# Patient Record
Sex: MALE | Race: WHITE | Employment: OTHER | URBAN - METROPOLITAN AREA
[De-identification: names, ages, dates, MRNs, and addresses within clinical notes are randomized per-mention and may not be internally consistent; named-entity substitution may affect disease eponyms.]

---

## 2017-01-12 PROCEDURE — 36415 COLL VENOUS BLD VENIPUNCTURE: CPT | Performed by: FAMILY MEDICINE

## 2017-01-12 PROCEDURE — 80061 LIPID PANEL: CPT | Performed by: FAMILY MEDICINE

## 2017-01-12 PROCEDURE — 80053 COMPREHEN METABOLIC PANEL: CPT | Performed by: FAMILY MEDICINE

## 2017-01-12 PROCEDURE — 83036 HEMOGLOBIN GLYCOSYLATED A1C: CPT | Performed by: FAMILY MEDICINE

## 2017-05-10 PROBLEM — I42.9 CARDIOMYOPATHY, UNSPECIFIED TYPE (HCC): Status: ACTIVE | Noted: 2017-05-10

## 2017-05-10 PROBLEM — G89.29 CHRONIC RIGHT-SIDED LOW BACK PAIN WITH RIGHT-SIDED SCIATICA: Status: ACTIVE | Noted: 2017-05-10

## 2017-05-10 PROBLEM — M54.41 CHRONIC RIGHT-SIDED LOW BACK PAIN WITH RIGHT-SIDED SCIATICA: Status: ACTIVE | Noted: 2017-05-10

## 2017-07-24 ENCOUNTER — HOSPITAL ENCOUNTER (OUTPATIENT)
Facility: HOSPITAL | Age: 72
Setting detail: HOSPITAL OUTPATIENT SURGERY
Discharge: HOME | End: 2017-07-24
Attending: INTERNAL MEDICINE | Admitting: INTERNAL MEDICINE
Payer: MEDICARE

## 2017-07-24 ENCOUNTER — SURGERY (OUTPATIENT)
Age: 72
End: 2017-07-24

## 2017-07-24 VITALS
TEMPERATURE: 98 F | RESPIRATION RATE: 18 BRPM | HEIGHT: 67 IN | BODY MASS INDEX: 20.88 KG/M2 | SYSTOLIC BLOOD PRESSURE: 126 MMHG | HEART RATE: 61 BPM | WEIGHT: 133 LBS | OXYGEN SATURATION: 96 % | DIASTOLIC BLOOD PRESSURE: 52 MMHG

## 2017-07-24 DIAGNOSIS — Z86.010 PERSONAL HISTORY OF COLONIC POLYPS: ICD-10-CM

## 2017-07-24 LAB — GLUCOSE BLD-MCNC: 94 MG/DL (ref 65–99)

## 2017-07-24 PROCEDURE — 0DJD8ZZ INSPECTION OF LOWER INTESTINAL TRACT, VIA NATURAL OR ARTIFICIAL OPENING ENDOSCOPIC: ICD-10-PCS | Performed by: INTERNAL MEDICINE

## 2017-07-24 PROCEDURE — 82962 GLUCOSE BLOOD TEST: CPT

## 2017-07-24 RX ORDER — MIDAZOLAM HYDROCHLORIDE 1 MG/ML
INJECTION INTRAMUSCULAR; INTRAVENOUS
Status: DISCONTINUED | OUTPATIENT
Start: 2017-07-24 | End: 2017-07-24

## 2017-07-24 RX ORDER — SODIUM CHLORIDE, SODIUM LACTATE, POTASSIUM CHLORIDE, CALCIUM CHLORIDE 600; 310; 30; 20 MG/100ML; MG/100ML; MG/100ML; MG/100ML
INJECTION, SOLUTION INTRAVENOUS CONTINUOUS
Status: DISCONTINUED | OUTPATIENT
Start: 2017-07-24 | End: 2017-07-24

## 2017-07-24 NOTE — OPERATIVE REPORT
[facility] Patient Status:  Hospital Outpatient Surgery    1945 MRN [id number]   [facility] ENDOSCOPY Attending Naina Etienne MD   Hosp Day # 0 PCP Meron Tolliver MD         PATIENT NAME: Mayela Edmond OF [facility]

## 2017-07-24 NOTE — H&P
[facility] GASTROENTEROLOGY    REFERRING PHYSICIAN: Dr. Kimani Abreu is a 67year old male.   CRC screening, Hx polyp 2014    See note reviewed from 4/21/17    PROCEDURE: Colonoscopy    Allergies: Niaspan [Niacin, Antihyperlipidem PLAN:  Jem Singer is a 67year old male. CRC screening, Hx polyp    1. Colonoscopy. I have discussed the risks and benefits and alternatives with the patient/family. They understand and agree to proceed with the plan of care.     I have reviewed th

## 2017-07-24 NOTE — DISCHARGE SUMMARY
Procedure(s): Colonoscopy    Findings:    1. Diverticulosis. 2.  Internal hemorrhoids    Disposition:  home    Patient Instructions:     1. Consume a high fiber diet. 2.  No repeat colonoscopy indicated.   3.  Resume Plavix today      Activity: activity

## 2018-01-23 PROCEDURE — 82043 UR ALBUMIN QUANTITATIVE: CPT | Performed by: FAMILY MEDICINE

## 2018-01-23 PROCEDURE — 82570 ASSAY OF URINE CREATININE: CPT | Performed by: FAMILY MEDICINE

## 2018-05-22 PROBLEM — J30.1 SEASONAL ALLERGIC RHINITIS DUE TO POLLEN: Status: ACTIVE | Noted: 2018-05-22

## 2018-09-18 PROCEDURE — 81003 URINALYSIS AUTO W/O SCOPE: CPT | Performed by: FAMILY MEDICINE

## 2019-05-24 PROBLEM — E11.22 TYPE 2 DIABETES MELLITUS WITH STAGE 3 CHRONIC KIDNEY DISEASE, WITHOUT LONG-TERM CURRENT USE OF INSULIN (HCC): Status: ACTIVE | Noted: 2019-05-24

## 2019-05-24 PROBLEM — N18.30 TYPE 2 DIABETES MELLITUS WITH STAGE 3 CHRONIC KIDNEY DISEASE, WITHOUT LONG-TERM CURRENT USE OF INSULIN (HCC): Status: ACTIVE | Noted: 2019-05-24

## 2019-05-24 PROBLEM — I50.22 CHRONIC SYSTOLIC CONGESTIVE HEART FAILURE (HCC): Status: ACTIVE | Noted: 2019-05-24

## 2020-08-09 ENCOUNTER — HOSPITAL ENCOUNTER (OUTPATIENT)
Age: 75
Discharge: HOME | End: 2020-08-09
Payer: MEDICARE

## 2020-08-09 VITALS
HEIGHT: 67.5 IN | WEIGHT: 132 LBS | DIASTOLIC BLOOD PRESSURE: 63 MMHG | SYSTOLIC BLOOD PRESSURE: 140 MMHG | TEMPERATURE: 98 F | OXYGEN SATURATION: 98 % | RESPIRATION RATE: 20 BRPM | BODY MASS INDEX: 20.47 KG/M2 | HEART RATE: 64 BPM

## 2020-08-09 DIAGNOSIS — H02.054 TRICHIASIS OF LEFT UPPER EYELID: Primary | ICD-10-CM

## 2020-08-09 DIAGNOSIS — H57.89 IRRITATION OF LEFT EYE: ICD-10-CM

## 2020-08-09 PROCEDURE — 99203 OFFICE O/P NEW LOW 30 MIN: CPT

## 2020-08-09 PROCEDURE — 99204 OFFICE O/P NEW MOD 45 MIN: CPT

## 2020-08-09 RX ORDER — TETRACAINE HYDROCHLORIDE 5 MG/ML
1 SOLUTION OPHTHALMIC ONCE
Status: COMPLETED | OUTPATIENT
Start: 2020-08-09 | End: 2020-08-09

## 2020-08-09 NOTE — ED PROVIDER NOTES
Patient Seen in: [facility]      History   Patient presents with:  Eye Problem    Stated Complaint: LEFT EYE IRRITATION    HPI    78-year-old male who comes in today stating 4 days of left eye irritation he states that it feels LEFT EYE IRRITATION  Other systems are as noted in HPI. Constitutional and vital signs reviewed. All other systems reviewed and negative except as noted above.     Physical Exam     ED Triage Vitals [08/09/20 1117]   /63   Pulse 64   Resp 20   T today and remains so upon discharge.  I discuss the plan of care with the patient, who expresses understanding.  All questions and concerns are addressed to the patient's satisfaction prior to discharge today.         Disposition and Plan     Clinical Impre

## 2021-07-26 PROBLEM — E46 PROTEIN-CALORIE MALNUTRITION, UNSPECIFIED SEVERITY (HCC): Status: ACTIVE | Noted: 2021-07-26

## 2021-07-26 PROBLEM — D69.6 PLATELETS DECREASED (HCC): Status: ACTIVE | Noted: 2021-07-26

## 2021-07-26 PROBLEM — D69.6 PLATELETS DECREASED: Status: ACTIVE | Noted: 2021-07-26

## 2021-11-19 PROBLEM — I65.29 CAROTID ATHEROSCLEROSIS: Status: ACTIVE | Noted: 2021-11-19

## 2022-03-21 PROBLEM — Z00.00 MEDICARE ANNUAL WELLNESS VISIT, SUBSEQUENT: Status: ACTIVE | Noted: 2022-03-21

## 2022-03-21 PROBLEM — F17.200 SMOKER: Status: ACTIVE | Noted: 2022-03-21

## 2023-11-13 ENCOUNTER — HOSPITAL ENCOUNTER (INPATIENT)
Facility: HOSPITAL | Age: 78
LOS: 1 days | Discharge: HOME | DRG: 193 | End: 2023-11-15
Attending: EMERGENCY MEDICINE | Admitting: HOSPITALIST
Payer: MEDICARE

## 2023-11-13 ENCOUNTER — HOSPITAL ENCOUNTER (INPATIENT)
Facility: HOSPITAL | Age: 78
LOS: 1 days | Discharge: HOME | End: 2023-11-15
Attending: EMERGENCY MEDICINE | Admitting: HOSPITALIST
Payer: MEDICARE

## 2023-11-13 DIAGNOSIS — E87.70 HYPERVOLEMIA, UNSPECIFIED HYPERVOLEMIA TYPE: ICD-10-CM

## 2023-11-13 DIAGNOSIS — E87.1 HYPONATREMIA: ICD-10-CM

## 2023-11-13 DIAGNOSIS — I50.22 CHRONIC SYSTOLIC HEART FAILURE (HCC): ICD-10-CM

## 2023-11-13 DIAGNOSIS — R09.02 HYPOXIA: Primary | ICD-10-CM

## 2023-11-13 DIAGNOSIS — R73.9 HYPERGLYCEMIA: ICD-10-CM

## 2023-11-13 DIAGNOSIS — I21.4 NSTEMI (NON-ST ELEVATED MYOCARDIAL INFARCTION) (HCC): ICD-10-CM

## 2023-11-13 DIAGNOSIS — J44.1 COPD EXACERBATION (HCC): ICD-10-CM

## 2023-11-13 DIAGNOSIS — J18.9 PNEUMONIA OF BOTH LOWER LOBES DUE TO INFECTIOUS ORGANISM: ICD-10-CM

## 2023-11-13 DIAGNOSIS — R06.2 WHEEZING: ICD-10-CM

## 2023-11-13 DIAGNOSIS — D69.6 THROMBOCYTOPENIA (HCC): ICD-10-CM

## 2023-11-13 DIAGNOSIS — N28.9 RENAL INSUFFICIENCY: ICD-10-CM

## 2023-11-13 DIAGNOSIS — D72.829 LEUKOCYTOSIS, UNSPECIFIED TYPE: ICD-10-CM

## 2023-11-13 DIAGNOSIS — Z72.0 TOBACCO USE: ICD-10-CM

## 2023-11-13 DIAGNOSIS — E87.5 HYPERKALEMIA: ICD-10-CM

## 2023-11-13 LAB
BASOPHILS # BLD AUTO: 0.09 X10(3) UL (ref 0–0.2)
BASOPHILS NFR BLD AUTO: 0.6 %
EOSINOPHIL # BLD AUTO: 0.28 X10(3) UL (ref 0–0.7)
EOSINOPHIL NFR BLD AUTO: 2 %
ERYTHROCYTE [DISTWIDTH] IN BLOOD BY AUTOMATED COUNT: 14.5 %
HCT VFR BLD AUTO: 41.9 %
HGB BLD-MCNC: 14.3 G/DL
IMM GRANULOCYTES # BLD AUTO: 0.21 X10(3) UL (ref 0–1)
IMM GRANULOCYTES NFR BLD: 1.5 %
LYMPHOCYTES # BLD AUTO: 2.02 X10(3) UL (ref 1–4)
LYMPHOCYTES NFR BLD AUTO: 14.3 %
MCH RBC QN AUTO: 29.7 PG (ref 26–34)
MCHC RBC AUTO-ENTMCNC: 34.1 G/DL (ref 31–37)
MCV RBC AUTO: 87.1 FL
MONOCYTES # BLD AUTO: 0.96 X10(3) UL (ref 0.1–1)
MONOCYTES NFR BLD AUTO: 6.8 %
NEUTROPHILS # BLD AUTO: 10.53 X10 (3) UL (ref 1.5–7.7)
NEUTROPHILS # BLD AUTO: 10.53 X10(3) UL (ref 1.5–7.7)
NEUTROPHILS NFR BLD AUTO: 74.8 %
PLATELET # BLD AUTO: 133 10(3)UL (ref 150–450)
RBC # BLD AUTO: 4.81 X10(6)UL
WBC # BLD AUTO: 14.1 X10(3) UL (ref 4–11)

## 2023-11-13 PROCEDURE — 99285 EMERGENCY DEPT VISIT HI MDM: CPT

## 2023-11-13 PROCEDURE — 0241U SARS-COV-2/FLU A AND B/RSV BY PCR (GENEXPERT): CPT | Performed by: EMERGENCY MEDICINE

## 2023-11-13 PROCEDURE — 93010 ELECTROCARDIOGRAM REPORT: CPT

## 2023-11-13 PROCEDURE — 96375 TX/PRO/DX INJ NEW DRUG ADDON: CPT

## 2023-11-13 PROCEDURE — 96365 THER/PROPH/DIAG IV INF INIT: CPT

## 2023-11-13 PROCEDURE — 83880 ASSAY OF NATRIURETIC PEPTIDE: CPT | Performed by: EMERGENCY MEDICINE

## 2023-11-13 PROCEDURE — 94644 CONT INHLJ TX 1ST HOUR: CPT

## 2023-11-13 PROCEDURE — 96367 TX/PROPH/DG ADDL SEQ IV INF: CPT

## 2023-11-13 PROCEDURE — 80053 COMPREHEN METABOLIC PANEL: CPT | Performed by: EMERGENCY MEDICINE

## 2023-11-13 PROCEDURE — 83036 HEMOGLOBIN GLYCOSYLATED A1C: CPT | Performed by: HOSPITALIST

## 2023-11-13 PROCEDURE — 84484 ASSAY OF TROPONIN QUANT: CPT | Performed by: EMERGENCY MEDICINE

## 2023-11-13 PROCEDURE — 36415 COLL VENOUS BLD VENIPUNCTURE: CPT

## 2023-11-13 PROCEDURE — 85025 COMPLETE CBC W/AUTO DIFF WBC: CPT | Performed by: EMERGENCY MEDICINE

## 2023-11-13 RX ORDER — METHYLPREDNISOLONE SODIUM SUCCINATE 125 MG/2ML
125 INJECTION, POWDER, LYOPHILIZED, FOR SOLUTION INTRAMUSCULAR; INTRAVENOUS ONCE
Status: COMPLETED | OUTPATIENT
Start: 2023-11-13 | End: 2023-11-14

## 2023-11-14 ENCOUNTER — APPOINTMENT (OUTPATIENT)
Dept: GENERAL RADIOLOGY | Facility: HOSPITAL | Age: 78
End: 2023-11-14
Attending: EMERGENCY MEDICINE
Payer: MEDICARE

## 2023-11-14 ENCOUNTER — APPOINTMENT (OUTPATIENT)
Dept: CV DIAGNOSTICS | Facility: HOSPITAL | Age: 78
End: 2023-11-14
Attending: HOSPITALIST
Payer: MEDICARE

## 2023-11-14 ENCOUNTER — APPOINTMENT (OUTPATIENT)
Dept: CV DIAGNOSTICS | Facility: HOSPITAL | Age: 78
DRG: 193 | End: 2023-11-14
Attending: HOSPITALIST
Payer: MEDICARE

## 2023-11-14 ENCOUNTER — APPOINTMENT (OUTPATIENT)
Dept: GENERAL RADIOLOGY | Facility: HOSPITAL | Age: 78
DRG: 193 | End: 2023-11-14
Attending: EMERGENCY MEDICINE
Payer: MEDICARE

## 2023-11-14 PROBLEM — R09.02 HYPOXIA: Status: ACTIVE | Noted: 2023-11-14

## 2023-11-14 PROBLEM — J18.9 PNEUMONIA OF BOTH LOWER LOBES DUE TO INFECTIOUS ORGANISM: Status: ACTIVE | Noted: 2023-11-14

## 2023-11-14 PROBLEM — N28.9 RENAL INSUFFICIENCY: Status: ACTIVE | Noted: 2023-11-14

## 2023-11-14 PROBLEM — R73.9 HYPERGLYCEMIA: Status: ACTIVE | Noted: 2023-11-14

## 2023-11-14 PROBLEM — D72.829 LEUKOCYTOSIS, UNSPECIFIED TYPE: Status: ACTIVE | Noted: 2023-11-14

## 2023-11-14 PROBLEM — D69.6 THROMBOCYTOPENIA: Status: ACTIVE | Noted: 2023-11-14

## 2023-11-14 PROBLEM — D69.6 THROMBOCYTOPENIA (HCC): Status: ACTIVE | Noted: 2023-11-14

## 2023-11-14 PROBLEM — J44.1 COPD EXACERBATION (HCC): Status: ACTIVE | Noted: 2023-11-14

## 2023-11-14 PROBLEM — E87.70 HYPERVOLEMIA, UNSPECIFIED HYPERVOLEMIA TYPE: Status: ACTIVE | Noted: 2023-11-14

## 2023-11-14 PROBLEM — I21.4 NSTEMI (NON-ST ELEVATED MYOCARDIAL INFARCTION) (HCC): Status: ACTIVE | Noted: 2023-11-14

## 2023-11-14 PROBLEM — R06.2 WHEEZING: Status: ACTIVE | Noted: 2023-11-14

## 2023-11-14 PROBLEM — E87.5 HYPERKALEMIA: Status: ACTIVE | Noted: 2023-11-14

## 2023-11-14 PROBLEM — E87.1 HYPONATREMIA: Status: ACTIVE | Noted: 2023-11-14

## 2023-11-14 PROBLEM — Z72.0 TOBACCO USE: Status: ACTIVE | Noted: 2023-11-14

## 2023-11-14 LAB
ALBUMIN SERPL-MCNC: 3.4 G/DL (ref 3.4–5)
ALBUMIN/GLOB SERPL: 0.9 {RATIO} (ref 1–2)
ALP LIVER SERPL-CCNC: 87 U/L
ALT SERPL-CCNC: 24 U/L
ANION GAP SERPL CALC-SCNC: 14 MMOL/L (ref 0–18)
ANION GAP SERPL CALC-SCNC: 4 MMOL/L (ref 0–18)
AST SERPL-CCNC: 16 U/L (ref 15–37)
ATRIAL RATE: 85 BPM
BILIRUB SERPL-MCNC: 0.6 MG/DL (ref 0.1–2)
BUN BLD-MCNC: 36 MG/DL (ref 9–23)
BUN BLD-MCNC: 38 MG/DL (ref 9–23)
CALCIUM BLD-MCNC: 9.2 MG/DL (ref 8.5–10.1)
CALCIUM BLD-MCNC: 9.4 MG/DL (ref 8.5–10.1)
CHLORIDE SERPL-SCNC: 103 MMOL/L (ref 98–112)
CHLORIDE SERPL-SCNC: 107 MMOL/L (ref 98–112)
CHOLEST SERPL-MCNC: 98 MG/DL (ref ?–200)
CO2 SERPL-SCNC: 18 MMOL/L (ref 21–32)
CO2 SERPL-SCNC: 28 MMOL/L (ref 21–32)
CREAT BLD-MCNC: 1.63 MG/DL
CREAT BLD-MCNC: 1.67 MG/DL
EGFRCR SERPLBLD CKD-EPI 2021: 41 ML/MIN/1.73M2 (ref 60–?)
EGFRCR SERPLBLD CKD-EPI 2021: 43 ML/MIN/1.73M2 (ref 60–?)
EST. AVERAGE GLUCOSE BLD GHB EST-MCNC: 134 MG/DL (ref 68–126)
FLUAV + FLUBV RNA SPEC NAA+PROBE: NEGATIVE
FLUAV + FLUBV RNA SPEC NAA+PROBE: NEGATIVE
GLOBULIN PLAS-MCNC: 3.7 G/DL (ref 2.8–4.4)
GLUCOSE BLD-MCNC: 115 MG/DL (ref 70–99)
GLUCOSE BLD-MCNC: 148 MG/DL (ref 70–99)
GLUCOSE BLD-MCNC: 158 MG/DL (ref 70–99)
GLUCOSE BLD-MCNC: 192 MG/DL (ref 70–99)
GLUCOSE BLD-MCNC: 203 MG/DL (ref 70–99)
GLUCOSE BLD-MCNC: 217 MG/DL (ref 70–99)
HBA1C MFR BLD: 6.3 % (ref ?–5.7)
HDLC SERPL-MCNC: 48 MG/DL (ref 40–59)
LACTATE SERPL-SCNC: 0.7 MMOL/L (ref 0.4–2)
LDLC SERPL CALC-MCNC: 38 MG/DL (ref ?–100)
MAGNESIUM SERPL-MCNC: 1.6 MG/DL (ref 1.6–2.6)
NONHDLC SERPL-MCNC: 50 MG/DL (ref ?–130)
NT-PROBNP SERPL-MCNC: ABNORMAL PG/ML (ref ?–450)
OSMOLALITY SERPL CALC.SUM OF ELEC: 294 MOSM/KG (ref 275–295)
OSMOLALITY SERPL CALC.SUM OF ELEC: 300 MOSM/KG (ref 275–295)
P AXIS: 88 DEGREES
P-R INTERVAL: 184 MS
POTASSIUM SERPL-SCNC: 4.9 MMOL/L (ref 3.5–5.1)
POTASSIUM SERPL-SCNC: 5.5 MMOL/L (ref 3.5–5.1)
PROCALCITONIN SERPL-MCNC: <0.05 NG/ML (ref ?–0.16)
PROT SERPL-MCNC: 7.1 G/DL (ref 6.4–8.2)
Q-T INTERVAL: 386 MS
QRS DURATION: 160 MS
QTC CALCULATION (BEZET): 459 MS
R AXIS: 89 DEGREES
RSV RNA SPEC NAA+PROBE: NEGATIVE
SARS-COV-2 RNA RESP QL NAA+PROBE: NOT DETECTED
SODIUM SERPL-SCNC: 135 MMOL/L (ref 136–145)
SODIUM SERPL-SCNC: 139 MMOL/L (ref 136–145)
T AXIS: 99 DEGREES
TRIGL SERPL-MCNC: 47 MG/DL (ref 30–149)
TROPONIN I SERPL HS-MCNC: 284 NG/L
TROPONIN I SERPL HS-MCNC: 33 NG/L
TROPONIN I SERPL HS-MCNC: 613 NG/L
TROPONIN I SERPL HS-MCNC: 884 NG/L
VENTRICULAR RATE: 85 BPM
VLDLC SERPL CALC-MCNC: 6 MG/DL (ref 0–30)

## 2023-11-14 PROCEDURE — 82962 GLUCOSE BLOOD TEST: CPT

## 2023-11-14 PROCEDURE — 84484 ASSAY OF TROPONIN QUANT: CPT | Performed by: INTERNAL MEDICINE

## 2023-11-14 PROCEDURE — 87040 BLOOD CULTURE FOR BACTERIA: CPT | Performed by: EMERGENCY MEDICINE

## 2023-11-14 PROCEDURE — 84484 ASSAY OF TROPONIN QUANT: CPT | Performed by: HOSPITALIST

## 2023-11-14 PROCEDURE — 83735 ASSAY OF MAGNESIUM: CPT | Performed by: HOSPITALIST

## 2023-11-14 PROCEDURE — 80048 BASIC METABOLIC PNL TOTAL CA: CPT | Performed by: HOSPITALIST

## 2023-11-14 PROCEDURE — 83605 ASSAY OF LACTIC ACID: CPT | Performed by: EMERGENCY MEDICINE

## 2023-11-14 PROCEDURE — 71045 X-RAY EXAM CHEST 1 VIEW: CPT | Performed by: EMERGENCY MEDICINE

## 2023-11-14 PROCEDURE — 93005 ELECTROCARDIOGRAM TRACING: CPT

## 2023-11-14 PROCEDURE — 93306 TTE W/DOPPLER COMPLETE: CPT | Performed by: HOSPITALIST

## 2023-11-14 PROCEDURE — 84484 ASSAY OF TROPONIN QUANT: CPT | Performed by: EMERGENCY MEDICINE

## 2023-11-14 PROCEDURE — 80061 LIPID PANEL: CPT | Performed by: EMERGENCY MEDICINE

## 2023-11-14 PROCEDURE — 84145 PROCALCITONIN (PCT): CPT | Performed by: HOSPITALIST

## 2023-11-14 RX ORDER — FUROSEMIDE 10 MG/ML
40 INJECTION INTRAMUSCULAR; INTRAVENOUS ONCE
Status: COMPLETED | OUTPATIENT
Start: 2023-11-14 | End: 2023-11-14

## 2023-11-14 RX ORDER — FUROSEMIDE 10 MG/ML
40 INJECTION INTRAMUSCULAR; INTRAVENOUS
Status: DISCONTINUED | OUTPATIENT
Start: 2023-11-14 | End: 2023-11-15

## 2023-11-14 RX ORDER — DIGOXIN 125 MCG
125 TABLET ORAL
Status: DISCONTINUED | OUTPATIENT
Start: 2023-11-15 | End: 2023-11-15

## 2023-11-14 RX ORDER — IPRATROPIUM BROMIDE AND ALBUTEROL SULFATE 2.5; .5 MG/3ML; MG/3ML
3 SOLUTION RESPIRATORY (INHALATION) EVERY 6 HOURS PRN
Status: DISCONTINUED | OUTPATIENT
Start: 2023-11-14 | End: 2023-11-15

## 2023-11-14 RX ORDER — DEXTROSE MONOHYDRATE 25 G/50ML
50 INJECTION, SOLUTION INTRAVENOUS
Status: DISCONTINUED | OUTPATIENT
Start: 2023-11-14 | End: 2023-11-15

## 2023-11-14 RX ORDER — MAGNESIUM OXIDE 400 MG/1
400 TABLET ORAL ONCE
Status: COMPLETED | OUTPATIENT
Start: 2023-11-14 | End: 2023-11-14

## 2023-11-14 RX ORDER — CLOPIDOGREL BISULFATE 75 MG/1
75 TABLET ORAL EVERY OTHER DAY
Status: DISCONTINUED | OUTPATIENT
Start: 2023-11-14 | End: 2023-11-14

## 2023-11-14 RX ORDER — AZITHROMYCIN 250 MG/1
500 TABLET, FILM COATED ORAL
Status: DISCONTINUED | OUTPATIENT
Start: 2023-11-15 | End: 2023-11-15

## 2023-11-14 RX ORDER — PANTOPRAZOLE SODIUM 40 MG/1
40 TABLET, DELAYED RELEASE ORAL
Status: DISCONTINUED | OUTPATIENT
Start: 2023-11-14 | End: 2023-11-15

## 2023-11-14 RX ORDER — DIGOXIN 125 MCG
125 TABLET ORAL DAILY
Status: DISCONTINUED | OUTPATIENT
Start: 2023-11-14 | End: 2023-11-14

## 2023-11-14 RX ORDER — ATORVASTATIN CALCIUM 20 MG/1
20 TABLET, FILM COATED ORAL NIGHTLY
Status: DISCONTINUED | OUTPATIENT
Start: 2023-11-14 | End: 2023-11-15

## 2023-11-14 RX ORDER — PREDNISONE 20 MG/1
40 TABLET ORAL
Status: DISCONTINUED | OUTPATIENT
Start: 2023-11-15 | End: 2023-11-15

## 2023-11-14 RX ORDER — ASPIRIN 81 MG/1
81 TABLET, CHEWABLE ORAL NIGHTLY
Status: DISCONTINUED | OUTPATIENT
Start: 2023-11-14 | End: 2023-11-15

## 2023-11-14 RX ORDER — CARVEDILOL 12.5 MG/1
12.5 TABLET ORAL 2 TIMES DAILY
Status: DISCONTINUED | OUTPATIENT
Start: 2023-11-14 | End: 2023-11-15

## 2023-11-14 RX ORDER — HYDROCHLOROTHIAZIDE 25 MG/1
25 TABLET ORAL EVERY OTHER DAY
Status: DISCONTINUED | OUTPATIENT
Start: 2023-11-14 | End: 2023-11-14

## 2023-11-14 RX ORDER — ACETAMINOPHEN 500 MG
500 TABLET ORAL EVERY 4 HOURS PRN
Status: DISCONTINUED | OUTPATIENT
Start: 2023-11-14 | End: 2023-11-15

## 2023-11-14 RX ORDER — NICOTINE POLACRILEX 4 MG
30 LOZENGE BUCCAL
Status: DISCONTINUED | OUTPATIENT
Start: 2023-11-14 | End: 2023-11-15

## 2023-11-14 RX ORDER — METHYLPREDNISOLONE SODIUM SUCCINATE 40 MG/ML
40 INJECTION, POWDER, LYOPHILIZED, FOR SOLUTION INTRAMUSCULAR; INTRAVENOUS EVERY 8 HOURS
Status: DISCONTINUED | OUTPATIENT
Start: 2023-11-14 | End: 2023-11-14

## 2023-11-14 RX ORDER — METOCLOPRAMIDE HYDROCHLORIDE 5 MG/ML
5 INJECTION INTRAMUSCULAR; INTRAVENOUS EVERY 8 HOURS PRN
Status: DISCONTINUED | OUTPATIENT
Start: 2023-11-14 | End: 2023-11-15

## 2023-11-14 RX ORDER — LISINOPRIL 5 MG/1
5 TABLET ORAL DAILY
Status: DISCONTINUED | OUTPATIENT
Start: 2023-11-14 | End: 2023-11-15

## 2023-11-14 RX ORDER — ONDANSETRON 2 MG/ML
4 INJECTION INTRAMUSCULAR; INTRAVENOUS EVERY 6 HOURS PRN
Status: DISCONTINUED | OUTPATIENT
Start: 2023-11-14 | End: 2023-11-15

## 2023-11-14 RX ORDER — CLOPIDOGREL BISULFATE 75 MG/1
75 TABLET ORAL DAILY
Status: DISCONTINUED | OUTPATIENT
Start: 2023-11-14 | End: 2023-11-15

## 2023-11-14 RX ORDER — FUROSEMIDE 10 MG/ML
20 INJECTION INTRAMUSCULAR; INTRAVENOUS ONCE
Status: DISCONTINUED | OUTPATIENT
Start: 2023-11-14 | End: 2023-11-14

## 2023-11-14 RX ORDER — METHYLPREDNISOLONE ACETATE 40 MG/ML
40 INJECTION, SUSPENSION INTRA-ARTICULAR; INTRALESIONAL; INTRAMUSCULAR; SOFT TISSUE ONCE
Status: DISCONTINUED | OUTPATIENT
Start: 2023-11-14 | End: 2023-11-14

## 2023-11-14 RX ORDER — NICOTINE POLACRILEX 4 MG
15 LOZENGE BUCCAL
Status: DISCONTINUED | OUTPATIENT
Start: 2023-11-14 | End: 2023-11-15

## 2023-11-14 NOTE — PROGRESS NOTES
NURSING ADMISSION NOTE      Patient admitted via Cart  Oriented to room. Safety precautions initiated. Bed in low position. Call light in reach. Assumed care for this patient at [address] [place] [address]. Admission navigator/PTA med list complete. Patient alert and oriented x4. Up with standby assist. NSR on tele. Maintaining o2 sats >90% on 1L NC. Denies SOB.  +orthos, asymptomatic. Continent of bowel/bladder. No c/o pain. Updated patient on POC, verbalized understanding. Safety precautions put in place, bed alarm on.

## 2023-11-14 NOTE — ED INITIAL ASSESSMENT (HPI)
Arrives via EMS from home. Pt c/o shortness of breath x 1.5 hours. Denies chest pain and fevers. O2 sat 81% on room air per EMS prior to arrival. Pt does not wear home o2. Pt on 6L nasal cannula upon arrival with o2 sat 100%. Unable to speak in complete sentences. A&ox4.

## 2023-11-14 NOTE — ED QUICK NOTES
Orders for admission, patient is aware of plan and ready to go upstairs. Any questions, please call ED RN Noy at extension [phone].      Patient Covid vaccination status: Fully vaccinated     COVID Test Ordered in ED: SARS-CoV-2/Flu A and B/RSV by PCR (GeneXpert)    COVID Suspicion at Admission: N/A    Running Infusions: Rocephin/Zithromax     Mental Status/LOC at time of transport: a&ox4    Other pertinent information hypoxic in 80's on room air, daily smoker, on 2L nasal cannula, from home  CIWA score: N/A   NIH score:  N/A

## 2023-11-14 NOTE — PHYSICAL THERAPY NOTE
Order received for PT eval and chart reviewed. Pt with elevated troponin trending up. Will hold therapy at this time. PT will continue to follow.

## 2023-11-14 NOTE — PLAN OF CARE
Pt alert able to make needs known. Denies any pain. Troponin of 884 called to cards APn. Cont on solumedrol  To be seen by pulmonology. Plan of care discussed with pt , verbalize understanding. Call light within reach.       Problem: Diabetes/Glucose Control  Goal: Glucose maintained within prescribed range  Description: INTERVENTIONS:  - Monitor Blood Glucose as ordered  - Assess for signs and symptoms of hyperglycemia and hypoglycemia  - Administer ordered medications to maintain glucose within target range  - Assess barriers to adequate nutritional intake and initiate nutrition consult as needed  - Instruct patient on self management of diabetes  Outcome: Progressing

## 2023-11-15 VITALS
DIASTOLIC BLOOD PRESSURE: 53 MMHG | BODY MASS INDEX: 18.18 KG/M2 | WEIGHT: 113.13 LBS | OXYGEN SATURATION: 92 % | SYSTOLIC BLOOD PRESSURE: 123 MMHG | RESPIRATION RATE: 20 BRPM | HEART RATE: 75 BPM | TEMPERATURE: 98 F | HEIGHT: 66 IN

## 2023-11-15 LAB
ALBUMIN SERPL-MCNC: 3.2 G/DL (ref 3.4–5)
ALBUMIN/GLOB SERPL: 1 {RATIO} (ref 1–2)
ALP LIVER SERPL-CCNC: 61 U/L
ALT SERPL-CCNC: 15 U/L
ANION GAP SERPL CALC-SCNC: 6 MMOL/L (ref 0–18)
AST SERPL-CCNC: 12 U/L (ref 15–37)
BASOPHILS # BLD AUTO: 0.02 X10(3) UL (ref 0–0.2)
BASOPHILS NFR BLD AUTO: 0.1 %
BILIRUB SERPL-MCNC: 0.5 MG/DL (ref 0.1–2)
BUN BLD-MCNC: 52 MG/DL (ref 9–23)
CALCIUM BLD-MCNC: 9.4 MG/DL (ref 8.5–10.1)
CHLORIDE SERPL-SCNC: 99 MMOL/L (ref 98–112)
CO2 SERPL-SCNC: 30 MMOL/L (ref 21–32)
CREAT BLD-MCNC: 1.77 MG/DL
DIGOXIN SERPL-MCNC: 1.03 NG/ML (ref 0.8–2)
EGFRCR SERPLBLD CKD-EPI 2021: 39 ML/MIN/1.73M2 (ref 60–?)
EOSINOPHIL # BLD AUTO: 0 X10(3) UL (ref 0–0.7)
EOSINOPHIL NFR BLD AUTO: 0 %
ERYTHROCYTE [DISTWIDTH] IN BLOOD BY AUTOMATED COUNT: 14.4 %
GLOBULIN PLAS-MCNC: 3.2 G/DL (ref 2.8–4.4)
GLUCOSE BLD-MCNC: 129 MG/DL (ref 70–99)
GLUCOSE BLD-MCNC: 129 MG/DL (ref 70–99)
GLUCOSE BLD-MCNC: 132 MG/DL (ref 70–99)
HCT VFR BLD AUTO: 37.2 %
HGB BLD-MCNC: 13.1 G/DL
IMM GRANULOCYTES # BLD AUTO: 0.08 X10(3) UL (ref 0–1)
IMM GRANULOCYTES NFR BLD: 0.6 %
LYMPHOCYTES # BLD AUTO: 0.9 X10(3) UL (ref 1–4)
LYMPHOCYTES NFR BLD AUTO: 6.3 %
MAGNESIUM SERPL-MCNC: 1.7 MG/DL (ref 1.6–2.6)
MCH RBC QN AUTO: 30.5 PG (ref 26–34)
MCHC RBC AUTO-ENTMCNC: 35.2 G/DL (ref 31–37)
MCV RBC AUTO: 86.7 FL
MONOCYTES # BLD AUTO: 0.92 X10(3) UL (ref 0.1–1)
MONOCYTES NFR BLD AUTO: 6.4 %
NEUTROPHILS # BLD AUTO: 12.41 X10 (3) UL (ref 1.5–7.7)
NEUTROPHILS # BLD AUTO: 12.41 X10(3) UL (ref 1.5–7.7)
NEUTROPHILS NFR BLD AUTO: 86.6 %
OSMOLALITY SERPL CALC.SUM OF ELEC: 296 MOSM/KG (ref 275–295)
PLATELET # BLD AUTO: 113 10(3)UL (ref 150–450)
POTASSIUM SERPL-SCNC: 4.2 MMOL/L (ref 3.5–5.1)
PROT SERPL-MCNC: 6.4 G/DL (ref 6.4–8.2)
RBC # BLD AUTO: 4.29 X10(6)UL
SODIUM SERPL-SCNC: 135 MMOL/L (ref 136–145)
WBC # BLD AUTO: 14.3 X10(3) UL (ref 4–11)

## 2023-11-15 PROCEDURE — 80162 ASSAY OF DIGOXIN TOTAL: CPT | Performed by: INTERNAL MEDICINE

## 2023-11-15 PROCEDURE — 97161 PT EVAL LOW COMPLEX 20 MIN: CPT

## 2023-11-15 PROCEDURE — 83735 ASSAY OF MAGNESIUM: CPT | Performed by: HOSPITALIST

## 2023-11-15 PROCEDURE — 82962 GLUCOSE BLOOD TEST: CPT

## 2023-11-15 PROCEDURE — 85025 COMPLETE CBC W/AUTO DIFF WBC: CPT | Performed by: HOSPITALIST

## 2023-11-15 PROCEDURE — 80053 COMPREHEN METABOLIC PANEL: CPT | Performed by: HOSPITALIST

## 2023-11-15 RX ORDER — ALBUTEROL SULFATE 90 UG/1
2 AEROSOL, METERED RESPIRATORY (INHALATION) EVERY 4 HOURS PRN
Qty: 1 EACH | Refills: 0 | Status: SHIPPED | OUTPATIENT
Start: 2023-11-15

## 2023-11-15 RX ORDER — MAGNESIUM OXIDE 400 MG/1
400 TABLET ORAL ONCE
Status: COMPLETED | OUTPATIENT
Start: 2023-11-15 | End: 2023-11-15

## 2023-11-15 RX ORDER — FUROSEMIDE 20 MG/1
20 TABLET ORAL DAILY
Qty: 90 TABLET | Refills: 3 | Status: SHIPPED | OUTPATIENT
Start: 2023-11-15 | End: 2024-11-09

## 2023-11-15 RX ORDER — CEFDINIR 300 MG/1
300 CAPSULE ORAL DAILY
Qty: 4 CAPSULE | Refills: 0 | Status: SHIPPED | OUTPATIENT
Start: 2023-11-16 | End: 2023-11-20

## 2023-11-15 RX ORDER — PREDNISONE 20 MG/1
40 TABLET ORAL
Qty: 8 TABLET | Refills: 0 | Status: SHIPPED | OUTPATIENT
Start: 2023-11-16

## 2023-11-15 NOTE — PLAN OF CARE
Pt A&O x4. Reports no pain. Denies SOB & chest pain  Maintaining O2 on room air. Lung sounds diminished. Productive cough with clear thin sputum. NSR, S1&S2 present. Bowel sounds active in all quadrants. Continent of bowel and bladder. Pt updated on plan of care. Plan of care:    Problem: Diabetes/Glucose Control  Goal: Glucose maintained within prescribed range  Description: INTERVENTIONS:  - Monitor Blood Glucose as ordered  - Assess for signs and symptoms of hyperglycemia and hypoglycemia  - Administer ordered medications to maintain glucose within target range  - Assess barriers to adequate nutritional intake and initiate nutrition consult as needed  - Instruct patient on self management of diabetes  Outcome: Progressing     Problem: Patient/Family Goals  Goal: Patient/Family Long Term Goal  Description: Patient's Long Term Goal: Discharge  Interventions:  - Follow MD orders  - See additional Care Plan goals for specific interventions  Outcome: Progressing  Goal: Patient/Family Short Term Goal  Description: Patient's Short Term Goal: Improve dyspnea    Interventions:   - Lasix, antibiotics  - See additional Care Plan goals for specific interventions  Outcome: Progressing     Problem: CARDIOVASCULAR - ADULT  Goal: Maintains optimal cardiac output and hemodynamic stability  Description: INTERVENTIONS:  - Monitor vital signs, rhythm, and trends  - Monitor for bleeding, hypotension and signs of decreased cardiac output  - Evaluate effectiveness of vasoactive medications to optimize hemodynamic stability  - Monitor arterial and/or venous puncture sites for bleeding and/or hematoma  - Assess quality of pulses, skin color and temperature  - Assess for signs of decreased coronary artery perfusion - ex.  Angina  - Evaluate fluid balance, assess for edema, trend weights  Outcome: Progressing  Goal: Absence of cardiac arrhythmias or at baseline  Description: INTERVENTIONS:  - Continuous cardiac monitoring, monitor vital signs, obtain 12 lead EKG if indicated  - Evaluate effectiveness of antiarrhythmic and heart rate control medications as ordered  - Initiate emergency measures for life threatening arrhythmias  - Monitor electrolytes and administer replacement therapy as ordered  Outcome: Progressing     Problem: SAFETY ADULT - FALL  Goal: Free from fall injury  Description: INTERVENTIONS:  - Assess pt frequently for physical needs  - Identify cognitive and physical deficits and behaviors that affect risk of falls.   - Colon fall precautions as indicated by assessment.  - Educate pt/family on patient safety including physical limitations  - Instruct pt to call for assistance with activity based on assessment  - Modify environment to reduce risk of injury  - Provide assistive devices as appropriate  - Consider OT/PT consult to assist with strengthening/mobility  - Encourage toileting schedule  Outcome: Progressing

## 2023-11-15 NOTE — PROGRESS NOTES
11/15/23 1331   Mobility   O2 walk?  Yes   SPO2% on Room Air at Rest 96   SPO2% Ambulation on Room Air 96

## 2023-11-15 NOTE — PHYSICAL THERAPY NOTE
PHYSICAL THERAPY EVALUATION - INPATIENT     Room Number: 6975/5495-P  Evaluation Date: 11/15/2023  Type of Evaluation: Initial  Physician Order: PT Eval and Treat    Presenting Problem: hypoxia  Co-Morbidities : CHF, COPD, DM2  Reason for Therapy: Mobility Dysfunction and Discharge Planning      ASSESSMENT   Pt is a 77year old male admitted on 2023 for hypoxia. Functional outcome measures completed include AMPAC. The AM-PAC '6-Clicks' Inpatient Basic Mobility Short Form was completed and this patient is demonstrating a Approx Degree of Impairment: 0%  degree of impairment in mobility. Research supports that patients with this level of impairment may benefit from home. Pt is ind with mobility, no LOB during session, good strength, appropriate for dc to home. No further skilled IP PT indicated. Will sign off. PT Discharge Recommendations: Home      PLAN  Patient has been evaluated and presents with no skilled Physical Therapy needs at this time. Patient discharged from Physical Therapy services. Please re-order if a new functional limitation presents during this admission. GOALS  Patient was able to achieve the following goals . .. Patient was able to transfer Safely and independently   Patient able to ambulate on level surfaces Safely and independently         HOME SITUATION  Type of Home: House   Home Layout: One level  Stairs to Enter : 0             Lives With: Spouse  Drives: Yes  Patient Owned Equipment: None  Patient Regularly Uses: None    Prior Level of [place]: Pt typically ind with all self care and mobility. Denies history of falls in last 6 months.     SUBJECTIVE  \"I'm feeling good\"      OBJECTIVE     Fall Risk: Standard fall risk    WEIGHT BEARING RESTRICTION  Weight Bearing Restriction: None                PAIN ASSESSMENT  Ratin          COGNITION  Overall Cognitive Status:  WFL - within functional limits    RANGE OF MOTION AND STRENGTH ASSESSMENT  Upper extremity ROM and strength are within functional limits     Lower extremity ROM is within functional limits     Lower extremity strength is within functional limits       BALANCE  Static Sitting: Good  Dynamic Sitting: Good  Static Standing: Good  Dynamic Standing: Good    ADDITIONAL TESTS                                    ACTIVITY TOLERANCE  Pulse: 67  Heart Rate Source: Monitor                   O2 WALK  Oxygen Therapy  SPO2% on Room Air at Rest: 96  SPO2% Ambulation on Room Air: 96    NEUROLOGICAL FINDINGS  Neurological Findings: None                     AM-PAC '6-Clicks' INPATIENT SHORT FORM - BASIC MOBILITY  How much difficulty does the patient currently have. .. Patient Difficulty: Turning over in bed (including adjusting bedclothes, sheets and blankets)?: None   Patient Difficulty: Sitting down on and standing up from a chair with arms (e.g., wheelchair, bedside commode, etc.): None   Patient Difficulty: Moving from lying on back to sitting on the side of the bed?: None   How much help from another person does the patient currently need. .. Help from Another: Moving to and from a bed to a chair (including a wheelchair)?: None   Help from Another: Need to walk in hospital room?: None   Help from Another: Climbing 3-5 steps with a railing?: None       AM-PAC Score:  Raw Score: 24   Approx Degree of Impairment: 0%   Standardized Score (AM-PAC Scale): 61.14   CMS Modifier (G-Code): CH    FUNCTIONAL ABILITY STATUS  Gait Assessment   Functional Mobility/Gait Assessment  Gait Assistance: Independent  Distance (ft): 1000+  Assistive Device: None  Pattern: Within Functional Limits    Skilled Therapy Provided     Bed Mobility:  Rolling: ind  Supine to sit: ind   Sit to supine: ind     Transfer Mobility:  Sit to stand: ind   Stand to sit: ind  Gait = ind    Therapist's comments:Pt mobility as above. O2 walk completed x 5 min. Sats steady at 96% throughout. RN aware. Pt denies SOB throughout session.     Exercise/Education Provided:  Functional activity tolerated  Gait training    Patient End of Session: In bed;Needs met;Call light within reach;RN aware of session/findings; All patient questions and concerns addressed; Family present    Patient Evaluation Complexity Level:  History Low - no personal factors and/or co-morbidities   Examination of body systems Low - addressing 1-2 elements   Clinical Presentation Low - Stable   Clinical Decision Making Low Complexity       PT Session Time: 15 minutes  Gait Trainin minutes

## 2023-11-16 ENCOUNTER — PATIENT OUTREACH (OUTPATIENT)
Dept: CASE MANAGEMENT | Age: 78
End: 2023-11-16

## 2023-11-16 NOTE — PROGRESS NOTES
[facility] apt request (discharged 11/15)    Dr Ophelia Lake  [address] [address] [phone]  Follow up 2 weeks  Apt made:   Mon 12/4 @3:00pm w/Ella Stark, NP    Dr Tammi Blunt, [place]as [address]  [address] [address] 905  Follow up 1 week  Dr Edel Hernandez office will call pt, due to no availability in needed time frame    Dr Jaskaran Pedro DR  [address] [place] [id number]  [phone]  Follow up 1 week  Apt made:  Tue 11/21 @11:30am  Confirmed w/pt  Closing encounter

## 2023-11-16 NOTE — PROGRESS NOTES
D: Patient received orders for discharge. Per Dr Denzel Haskins, patient can be discharged. A: Reviewed heart failure discharge instructions, folder given; reviewed discharge instructions, including appointments to be made and a 48 ounce fluid restriction per Dr Denzel Haskins, and will keep a log of blood pressures and weight for his follow up appointment with Dr Denzel Haskins; reviewed discharge medications, patient to  at his pharmacy; PIV removed; cardiac monitor removed; belongings removed from room. R: Patient left via wheelchair with RN to patient's  wife's car without incident.

## 2023-11-17 NOTE — PAYOR COMM NOTE
Discharge Notification    Patient Name: Corey Khan #: [id number]  Authorization Number: [id number]  [facility] Date/Time: 11/13/2023 11:37 PM  Discharge Date/Time: 11/15/2023 5:42 PM

## 2023-11-21 NOTE — CDS QUERY
Ariane Cho    Dear Dr Juwan Coleman,  Clinical information (provided below) includes documentation of Acute hypoxemic respiratory failure by the pulmonologist and cardiologist.  PLEASE INDICATE IF YOU ARE IN AGREEMENT WITH THE FOLLOWING   ASSESSMENT BY THE PROVDERS:    [  ] Acute hypoxemic respiratory failure is a confirmed/treated diagnosis      [  ] Acute hypoxemic respiratory failure is ruled out     [  ] Other please comment:  ____________________________        Documentation from the Medical Record:      Risks: smoker, pneumonia, COPD    Clinical indicators:  11/13 ED 77 Y/M- Arrives via EMS from home. Pt c/o shortness of breath x 1.5 hours. Denies chest pain and fevers. O2 sat 81% on room air per EMS prior to arrival. Pt does not wear home o2. Pt on 6L nasal cannula upon arrival with o2 sat 100%. Unable to speak in complete sentences. A&ox4. -ED- Patient reports a few days ago he had an episode lasting 15 minutes but today became increasingly short of breath. Patient is found to be hypoxic by EMS with SPO2 of 81% on room air. Patient normally does not wear oxygen he was placed on supplemental O2. On exam patient is wheezing he speaks in 4-5 word sentences. Patient continues to use tobacco.  He has never been diagnosed with COPD before but he does appear to have COPD changes physically. Pulmonary: Effort: Tachypnea and accessory muscle usage present. Breath sounds: Wheezing present    -pro BNP- 14,754, WBC- 14.1    -CXR- Development of new patchy opacities mid-lower lung bilateral greater on the right, not present on the prior examination although that examination was from more than 15 years ago, could reflect developing areas of pneumonia. -11/14 cardiology PN- Acute on chronic systolic heart failure - proBNP 14K, pulm edema CXR.  Relevant history includes chronic systolic heart failure with ischemic CM, LVEF stably ~ 30%    -SpO2- 95% on 2 L NC (PF ratio 285), up to 6 L NC    11/14 Pulmonary PN- Acute Hypoxic Resp Failure  due to PNA vs COPD -wean O2 as tolerated- weaned to RA on visit    11/14 cardiology PN- 77year old male admitted with worsening SOB   Acute hypoxemic respiratory failure  Acute on chronic systolic heart failure - proBNP 14K, pulm edema CXR  Possible COPD exacerbation / PNA    IV Lasix -Steroids / Abx per primary/pulm for COPD -Update echo    Treatment: I/Os, cardiology consult, pulmonary consult, IV lasix, nebulizer q 4 prn, IV azithromax, echo, cardiac monitoring, IV rocephin, IV solumedrol, supplemental O2       For questions regarding this query, please contact Clinical Documentation : Servando Vale RN, BSN ext [phone].                                                                  THIS FORM IS A PERMANENT PART OF THE MEDICAL RECORD

## 2024-03-06 ENCOUNTER — HOSPITAL ENCOUNTER (OUTPATIENT)
Dept: LAB | Facility: HOSPITAL | Age: 79
Discharge: HOME | End: 2024-03-06
Attending: INTERNAL MEDICINE
Payer: MEDICARE

## 2024-03-06 LAB
ANION GAP SERPL CALC-SCNC: 6 MMOL/L (ref 0–18)
BASOPHILS # BLD AUTO: 0.08 X10(3) UL (ref 0–0.2)
BASOPHILS NFR BLD AUTO: 0.9 %
BUN BLD-MCNC: 76 MG/DL (ref 9–23)
CALCIUM BLD-MCNC: 10.1 MG/DL (ref 8.5–10.1)
CHLORIDE SERPL-SCNC: 105 MMOL/L (ref 98–112)
CO2 SERPL-SCNC: 28 MMOL/L (ref 21–32)
CREAT BLD-MCNC: 2.23 MG/DL
EGFRCR SERPLBLD CKD-EPI 2021: 29 ML/MIN/1.73M2 (ref 60–?)
EOSINOPHIL # BLD AUTO: 0.39 X10(3) UL (ref 0–0.7)
EOSINOPHIL NFR BLD AUTO: 4.2 %
ERYTHROCYTE [DISTWIDTH] IN BLOOD BY AUTOMATED COUNT: 13 %
FASTING STATUS PATIENT QL REPORTED: NO
GLUCOSE BLD-MCNC: 170 MG/DL (ref 70–99)
HCT VFR BLD AUTO: 42.2 %
HGB BLD-MCNC: 14.6 G/DL
IMM GRANULOCYTES # BLD AUTO: 0.07 X10(3) UL (ref 0–1)
IMM GRANULOCYTES NFR BLD: 0.8 %
LYMPHOCYTES # BLD AUTO: 2.03 X10(3) UL (ref 1–4)
LYMPHOCYTES NFR BLD AUTO: 22 %
MCH RBC QN AUTO: 29.9 PG (ref 26–34)
MCHC RBC AUTO-ENTMCNC: 34.6 G/DL (ref 31–37)
MCV RBC AUTO: 86.5 FL
MONOCYTES # BLD AUTO: 0.77 X10(3) UL (ref 0.1–1)
MONOCYTES NFR BLD AUTO: 8.3 %
NEUTROPHILS # BLD AUTO: 5.89 X10 (3) UL (ref 1.5–7.7)
NEUTROPHILS # BLD AUTO: 5.89 X10(3) UL (ref 1.5–7.7)
NEUTROPHILS NFR BLD AUTO: 63.8 %
OSMOLALITY SERPL CALC.SUM OF ELEC: 315 MOSM/KG (ref 275–295)
PLATELET # BLD AUTO: 130 10(3)UL (ref 150–450)
POTASSIUM SERPL-SCNC: 4.5 MMOL/L (ref 3.5–5.1)
RBC # BLD AUTO: 4.88 X10(6)UL
SODIUM SERPL-SCNC: 139 MMOL/L (ref 136–145)
WBC # BLD AUTO: 9.2 X10(3) UL (ref 4–11)

## 2024-03-06 PROCEDURE — 85025 COMPLETE CBC W/AUTO DIFF WBC: CPT | Performed by: INTERNAL MEDICINE

## 2024-03-06 PROCEDURE — 36415 COLL VENOUS BLD VENIPUNCTURE: CPT | Performed by: INTERNAL MEDICINE

## 2024-03-06 PROCEDURE — 80048 BASIC METABOLIC PNL TOTAL CA: CPT | Performed by: INTERNAL MEDICINE

## 2024-10-19 ENCOUNTER — HOSPITAL ENCOUNTER (INPATIENT)
Facility: HOSPITAL | Age: 79
LOS: 8 days | Discharge: HOME HEALTH | End: 2024-10-28
Attending: STUDENT IN AN ORGANIZED HEALTH CARE EDUCATION/TRAINING PROGRAM | Admitting: HOSPITALIST
Payer: MEDICARE

## 2024-10-19 ENCOUNTER — APPOINTMENT (OUTPATIENT)
Dept: GENERAL RADIOLOGY | Facility: HOSPITAL | Age: 79
End: 2024-10-19
Attending: STUDENT IN AN ORGANIZED HEALTH CARE EDUCATION/TRAINING PROGRAM
Payer: MEDICARE

## 2024-10-19 DIAGNOSIS — Z95.810 ISCHEMIC CARDIOMYOPATHY WITH IMPLANTABLE CARDIOVERTER-DEFIBRILLATOR (ICD): ICD-10-CM

## 2024-10-19 DIAGNOSIS — I47.20 VENTRICULAR TACHYARRHYTHMIA (HCC): Primary | ICD-10-CM

## 2024-10-19 DIAGNOSIS — E11.21 TYPE 2 DIABETES MELLITUS WITH DIABETIC NEPHROPATHY, WITHOUT LONG-TERM CURRENT USE OF INSULIN (HCC): ICD-10-CM

## 2024-10-19 DIAGNOSIS — I25.5 ISCHEMIC CARDIOMYOPATHY WITH IMPLANTABLE CARDIOVERTER-DEFIBRILLATOR (ICD): ICD-10-CM

## 2024-10-19 LAB
ALBUMIN SERPL-MCNC: 4.6 G/DL (ref 3.2–4.8)
ALBUMIN/GLOB SERPL: 1.8 {RATIO} (ref 1–2)
ALP LIVER SERPL-CCNC: 93 U/L
ALT SERPL-CCNC: 14 U/L
ANION GAP SERPL CALC-SCNC: 3 MMOL/L (ref 0–18)
APTT PPP: 48.9 SECONDS (ref 23–36)
AST SERPL-CCNC: 15 U/L (ref ?–34)
BASOPHILS # BLD AUTO: 0.08 X10(3) UL (ref 0–0.2)
BASOPHILS NFR BLD AUTO: 1.1 %
BILIRUB SERPL-MCNC: 0.3 MG/DL (ref 0.2–1.1)
BUN BLD-MCNC: 48 MG/DL (ref 9–23)
CALCIUM BLD-MCNC: 10.2 MG/DL (ref 8.7–10.4)
CHLORIDE SERPL-SCNC: 107 MMOL/L (ref 98–112)
CO2 SERPL-SCNC: 27 MMOL/L (ref 21–32)
CREAT BLD-MCNC: 2.16 MG/DL
DIGOXIN SERPL-MCNC: 0.87 NG/ML (ref 0.8–2)
EGFRCR SERPLBLD CKD-EPI 2021: 30 ML/MIN/1.73M2 (ref 60–?)
EOSINOPHIL # BLD AUTO: 0.43 X10(3) UL (ref 0–0.7)
EOSINOPHIL NFR BLD AUTO: 5.7 %
ERYTHROCYTE [DISTWIDTH] IN BLOOD BY AUTOMATED COUNT: 13.6 %
GLOBULIN PLAS-MCNC: 2.6 G/DL (ref 2–3.5)
GLUCOSE BLD-MCNC: 147 MG/DL (ref 70–99)
HCT VFR BLD AUTO: 37.2 %
HGB BLD-MCNC: 12.8 G/DL
IMM GRANULOCYTES # BLD AUTO: 0.06 X10(3) UL (ref 0–1)
IMM GRANULOCYTES NFR BLD: 0.8 %
INR BLD: 1.01 (ref 0.8–1.2)
LYMPHOCYTES # BLD AUTO: 1.93 X10(3) UL (ref 1–4)
LYMPHOCYTES NFR BLD AUTO: 25.8 %
MCH RBC QN AUTO: 30 PG (ref 26–34)
MCHC RBC AUTO-ENTMCNC: 34.4 G/DL (ref 31–37)
MCV RBC AUTO: 87.3 FL
MONOCYTES # BLD AUTO: 0.74 X10(3) UL (ref 0.1–1)
MONOCYTES NFR BLD AUTO: 9.9 %
NEUTROPHILS # BLD AUTO: 4.24 X10 (3) UL (ref 1.5–7.7)
NEUTROPHILS # BLD AUTO: 4.24 X10(3) UL (ref 1.5–7.7)
NEUTROPHILS NFR BLD AUTO: 56.7 %
OSMOLALITY SERPL CALC.SUM OF ELEC: 299 MOSM/KG (ref 275–295)
PLATELET # BLD AUTO: 128 10(3)UL (ref 150–450)
POTASSIUM SERPL-SCNC: 4.9 MMOL/L (ref 3.5–5.1)
PROT SERPL-MCNC: 7.2 G/DL (ref 5.7–8.2)
PROTHROMBIN TIME: 13.4 SECONDS (ref 11.6–14.8)
RBC # BLD AUTO: 4.26 X10(6)UL
SODIUM SERPL-SCNC: 137 MMOL/L (ref 136–145)
TROPONIN I SERPL HS-MCNC: 42 NG/L
WBC # BLD AUTO: 7.5 X10(3) UL (ref 4–11)

## 2024-10-19 PROCEDURE — 96376 TX/PRO/DX INJ SAME DRUG ADON: CPT

## 2024-10-19 PROCEDURE — 85730 THROMBOPLASTIN TIME PARTIAL: CPT | Performed by: STUDENT IN AN ORGANIZED HEALTH CARE EDUCATION/TRAINING PROGRAM

## 2024-10-19 PROCEDURE — 93010 ELECTROCARDIOGRAM REPORT: CPT

## 2024-10-19 PROCEDURE — 80162 ASSAY OF DIGOXIN TOTAL: CPT | Performed by: STUDENT IN AN ORGANIZED HEALTH CARE EDUCATION/TRAINING PROGRAM

## 2024-10-19 PROCEDURE — 85025 COMPLETE CBC W/AUTO DIFF WBC: CPT | Performed by: STUDENT IN AN ORGANIZED HEALTH CARE EDUCATION/TRAINING PROGRAM

## 2024-10-19 PROCEDURE — 96375 TX/PRO/DX INJ NEW DRUG ADDON: CPT

## 2024-10-19 PROCEDURE — 93005 ELECTROCARDIOGRAM TRACING: CPT

## 2024-10-19 PROCEDURE — 82728 ASSAY OF FERRITIN: CPT | Performed by: INTERNAL MEDICINE

## 2024-10-19 PROCEDURE — 83550 IRON BINDING TEST: CPT | Performed by: INTERNAL MEDICINE

## 2024-10-19 PROCEDURE — 71045 X-RAY EXAM CHEST 1 VIEW: CPT | Performed by: STUDENT IN AN ORGANIZED HEALTH CARE EDUCATION/TRAINING PROGRAM

## 2024-10-19 PROCEDURE — 99285 EMERGENCY DEPT VISIT HI MDM: CPT

## 2024-10-19 PROCEDURE — 85610 PROTHROMBIN TIME: CPT | Performed by: STUDENT IN AN ORGANIZED HEALTH CARE EDUCATION/TRAINING PROGRAM

## 2024-10-19 PROCEDURE — 83540 ASSAY OF IRON: CPT | Performed by: INTERNAL MEDICINE

## 2024-10-19 PROCEDURE — 80053 COMPREHEN METABOLIC PANEL: CPT | Performed by: STUDENT IN AN ORGANIZED HEALTH CARE EDUCATION/TRAINING PROGRAM

## 2024-10-19 PROCEDURE — 84484 ASSAY OF TROPONIN QUANT: CPT | Performed by: STUDENT IN AN ORGANIZED HEALTH CARE EDUCATION/TRAINING PROGRAM

## 2024-10-19 PROCEDURE — 96365 THER/PROPH/DIAG IV INF INIT: CPT

## 2024-10-19 RX ORDER — AMIODARONE HYDROCHLORIDE 150 MG/3ML
150 INJECTION, SOLUTION INTRAVENOUS ONCE
Status: COMPLETED | OUTPATIENT
Start: 2024-10-19 | End: 2024-10-19

## 2024-10-20 ENCOUNTER — APPOINTMENT (OUTPATIENT)
Dept: GENERAL RADIOLOGY | Facility: HOSPITAL | Age: 79
End: 2024-10-20
Attending: HOSPITALIST
Payer: MEDICARE

## 2024-10-20 ENCOUNTER — APPOINTMENT (OUTPATIENT)
Dept: CV DIAGNOSTICS | Facility: HOSPITAL | Age: 79
End: 2024-10-20
Attending: HOSPITALIST
Payer: MEDICARE

## 2024-10-20 PROBLEM — I47.20 VENTRICULAR TACHYARRHYTHMIA (HCC): Status: ACTIVE | Noted: 2024-10-20

## 2024-10-20 PROBLEM — Z95.810 ISCHEMIC CARDIOMYOPATHY WITH IMPLANTABLE CARDIOVERTER-DEFIBRILLATOR (ICD): Status: ACTIVE | Noted: 2024-10-20

## 2024-10-20 PROBLEM — I25.5 ISCHEMIC CARDIOMYOPATHY WITH IMPLANTABLE CARDIOVERTER-DEFIBRILLATOR (ICD): Status: ACTIVE | Noted: 2024-10-20

## 2024-10-20 LAB
ALBUMIN SERPL-MCNC: 4.3 G/DL (ref 3.2–4.8)
ANION GAP SERPL CALC-SCNC: 2 MMOL/L (ref 0–18)
ANION GAP SERPL CALC-SCNC: 4 MMOL/L (ref 0–18)
ANION GAP SERPL CALC-SCNC: 5 MMOL/L (ref 0–18)
ARTERIAL PATENCY WRIST A: POSITIVE
ARTERIAL PATENCY WRIST A: POSITIVE
ATRIAL RATE: 78 BPM
BASE EXCESS BLDA CALC-SCNC: -3.4 MMOL/L (ref ?–2)
BASE EXCESS BLDA CALC-SCNC: -5.2 MMOL/L (ref ?–2)
BILIRUB UR QL STRIP.AUTO: NEGATIVE
BODY TEMPERATURE: 98.6 F
BODY TEMPERATURE: 98.6 F
BUN BLD-MCNC: 41 MG/DL (ref 9–23)
BUN BLD-MCNC: 48 MG/DL (ref 9–23)
BUN BLD-MCNC: 50 MG/DL (ref 9–23)
CA-I BLD-SCNC: 1.24 MMOL/L (ref 0.95–1.32)
CA-I BLD-SCNC: 1.29 MMOL/L (ref 0.95–1.32)
CALCIUM BLD-MCNC: 9.6 MG/DL (ref 8.7–10.4)
CALCIUM BLD-MCNC: 9.7 MG/DL (ref 8.7–10.4)
CALCIUM BLD-MCNC: 9.8 MG/DL (ref 8.7–10.4)
CHLORIDE SERPL-SCNC: 106 MMOL/L (ref 98–112)
CHLORIDE SERPL-SCNC: 107 MMOL/L (ref 98–112)
CHLORIDE SERPL-SCNC: 107 MMOL/L (ref 98–112)
CK SERPL-CCNC: 46 U/L
CLARITY UR REFRACT.AUTO: CLEAR
CO2 SERPL-SCNC: 22 MMOL/L (ref 21–32)
CO2 SERPL-SCNC: 24 MMOL/L (ref 21–32)
CO2 SERPL-SCNC: 25 MMOL/L (ref 21–32)
COHGB MFR BLD: 0 % SAT (ref 0–3)
COHGB MFR BLD: 1 % SAT (ref 0–3)
CREAT BLD-MCNC: 1.97 MG/DL
CREAT BLD-MCNC: 2.06 MG/DL
CREAT BLD-MCNC: 2.16 MG/DL
CREAT UR-SCNC: 51.5 MG/DL
DEPRECATED HBV CORE AB SER IA-ACNC: 71 NG/ML
EGFRCR SERPLBLD CKD-EPI 2021: 30 ML/MIN/1.73M2 (ref 60–?)
EGFRCR SERPLBLD CKD-EPI 2021: 32 ML/MIN/1.73M2 (ref 60–?)
EGFRCR SERPLBLD CKD-EPI 2021: 34 ML/MIN/1.73M2 (ref 60–?)
EXPIRATORY PRESSURE: 6 CM H2O
FIO2: 30 %
GLUCOSE BLD-MCNC: 137 MG/DL (ref 70–99)
GLUCOSE BLD-MCNC: 157 MG/DL (ref 70–99)
GLUCOSE BLD-MCNC: 167 MG/DL (ref 70–99)
GLUCOSE BLD-MCNC: 178 MG/DL (ref 70–99)
GLUCOSE BLD-MCNC: 208 MG/DL (ref 70–99)
GLUCOSE BLD-MCNC: 230 MG/DL (ref 70–99)
GLUCOSE BLD-MCNC: 237 MG/DL (ref 70–99)
GLUCOSE BLD-MCNC: 242 MG/DL (ref 70–99)
GLUCOSE BLD-MCNC: 268 MG/DL (ref 70–99)
GLUCOSE UR STRIP.AUTO-MCNC: 150 MG/DL
HCO3 BLDA-SCNC: 20.8 MEQ/L (ref 21–27)
HCO3 BLDA-SCNC: 22.2 MEQ/L (ref 21–27)
HGB BLD-MCNC: 13 G/DL
HGB BLD-MCNC: 14 G/DL
INSP PRESSURE: 16 CM H2O
IRON SATN MFR SERPL: 19 %
IRON SERPL-MCNC: 52 UG/DL
KETONES UR STRIP.AUTO-MCNC: NEGATIVE MG/DL
L/M: 3 L/MIN
LACTATE BLD-SCNC: 0.9 MMOL/L (ref 0.5–2)
LACTATE BLD-SCNC: 1.4 MMOL/L (ref 0.5–2)
LEUKOCYTE ESTERASE UR QL STRIP.AUTO: NEGATIVE
MAGNESIUM SERPL-MCNC: 2 MG/DL (ref 1.6–2.6)
METHGB MFR BLD: 0.2 % SAT (ref 0.4–1.5)
METHGB MFR BLD: 2 % SAT (ref 0.4–1.5)
NITRITE UR QL STRIP.AUTO: NEGATIVE
OSMOLALITY SERPL CALC.SUM OF ELEC: 294 MOSM/KG (ref 275–295)
OSMOLALITY SERPL CALC.SUM OF ELEC: 296 MOSM/KG (ref 275–295)
OSMOLALITY SERPL CALC.SUM OF ELEC: 300 MOSM/KG (ref 275–295)
OSMOLALITY UR: 376 MOSM/KG (ref 300–1300)
OXYHGB MFR BLDA: 93.5 % (ref 92–100)
OXYHGB MFR BLDA: 95.9 % (ref 92–100)
P AXIS: 88 DEGREES
P-R INTERVAL: 200 MS
PCO2 BLDA: 44 MM HG (ref 35–45)
PCO2 BLDA: 59 MM HG (ref 35–45)
PH BLDA: 7.21 [PH] (ref 7.35–7.45)
PH BLDA: 7.32 [PH] (ref 7.35–7.45)
PH UR STRIP.AUTO: 5 [PH] (ref 5–8)
PHOSPHATE SERPL-MCNC: 4.1 MG/DL (ref 2.4–5.1)
PHOSPHATE SERPL-MCNC: 4.3 MG/DL (ref 2.4–5.1)
PO2 BLDA: 102 MM HG (ref 80–100)
PO2 BLDA: 84 MM HG (ref 80–100)
POTASSIUM BLD-SCNC: 6.4 MMOL/L (ref 3.6–5.1)
POTASSIUM BLD-SCNC: 6.5 MMOL/L (ref 3.6–5.1)
POTASSIUM SERPL-SCNC: 4.5 MMOL/L (ref 3.5–5.1)
POTASSIUM SERPL-SCNC: 5.7 MMOL/L (ref 3.5–5.1)
POTASSIUM SERPL-SCNC: 5.8 MMOL/L (ref 3.5–5.1)
POTASSIUM SERPL-SCNC: 6.1 MMOL/L (ref 3.5–5.1)
PROT UR STRIP.AUTO-MCNC: 30 MG/DL
Q-T INTERVAL: 400 MS
QRS DURATION: 172 MS
QTC CALCULATION (BEZET): 456 MS
R AXIS: 96 DEGREES
SARS-COV-2 RNA RESP QL NAA+PROBE: NOT DETECTED
SODIUM BLD-SCNC: 130 MMOL/L (ref 135–145)
SODIUM BLD-SCNC: 131 MMOL/L (ref 135–145)
SODIUM SERPL-SCNC: 133 MMOL/L (ref 136–145)
SODIUM SERPL-SCNC: 134 MMOL/L (ref 136–145)
SODIUM SERPL-SCNC: 135 MMOL/L (ref 136–145)
SODIUM SERPL-SCNC: 54 MMOL/L
SP GR UR STRIP.AUTO: 1.01 (ref 1–1.03)
T AXIS: 84 DEGREES
TOTAL IRON BINDING CAPACITY: 281 UG/DL (ref 250–425)
TRANSFERRIN SERPL-MCNC: 223 MG/DL (ref 215–365)
UROBILINOGEN UR STRIP.AUTO-MCNC: NORMAL MG/DL
UUN UR-MCNC: 373 MG/DL
VENTRICULAR RATE: 78 BPM

## 2024-10-20 PROCEDURE — 82803 BLOOD GASES ANY COMBINATION: CPT | Performed by: INTERNAL MEDICINE

## 2024-10-20 PROCEDURE — 80048 BASIC METABOLIC PNL TOTAL CA: CPT | Performed by: STUDENT IN AN ORGANIZED HEALTH CARE EDUCATION/TRAINING PROGRAM

## 2024-10-20 PROCEDURE — 94799 UNLISTED PULMONARY SVC/PX: CPT

## 2024-10-20 PROCEDURE — 94002 VENT MGMT INPAT INIT DAY: CPT

## 2024-10-20 PROCEDURE — 85018 HEMOGLOBIN: CPT | Performed by: HOSPITALIST

## 2024-10-20 PROCEDURE — 83050 HGB METHEMOGLOBIN QUAN: CPT | Performed by: HOSPITALIST

## 2024-10-20 PROCEDURE — 87641 MR-STAPH DNA AMP PROBE: CPT | Performed by: HOSPITALIST

## 2024-10-20 PROCEDURE — 82962 GLUCOSE BLOOD TEST: CPT

## 2024-10-20 PROCEDURE — 84295 ASSAY OF SERUM SODIUM: CPT | Performed by: INTERNAL MEDICINE

## 2024-10-20 PROCEDURE — 85018 HEMOGLOBIN: CPT | Performed by: INTERNAL MEDICINE

## 2024-10-20 PROCEDURE — 84132 ASSAY OF SERUM POTASSIUM: CPT | Performed by: INTERNAL MEDICINE

## 2024-10-20 PROCEDURE — 36600 WITHDRAWAL OF ARTERIAL BLOOD: CPT | Performed by: HOSPITALIST

## 2024-10-20 PROCEDURE — 84540 ASSAY OF URINE/UREA-N: CPT | Performed by: INTERNAL MEDICINE

## 2024-10-20 PROCEDURE — 80048 BASIC METABOLIC PNL TOTAL CA: CPT | Performed by: INTERNAL MEDICINE

## 2024-10-20 PROCEDURE — 84100 ASSAY OF PHOSPHORUS: CPT | Performed by: INTERNAL MEDICINE

## 2024-10-20 PROCEDURE — 82375 ASSAY CARBOXYHB QUANT: CPT | Performed by: HOSPITALIST

## 2024-10-20 PROCEDURE — 94660 CPAP INITIATION&MGMT: CPT

## 2024-10-20 PROCEDURE — 84295 ASSAY OF SERUM SODIUM: CPT | Performed by: HOSPITALIST

## 2024-10-20 PROCEDURE — 36600 WITHDRAWAL OF ARTERIAL BLOOD: CPT | Performed by: INTERNAL MEDICINE

## 2024-10-20 PROCEDURE — 84300 ASSAY OF URINE SODIUM: CPT | Performed by: INTERNAL MEDICINE

## 2024-10-20 PROCEDURE — 82375 ASSAY CARBOXYHB QUANT: CPT | Performed by: INTERNAL MEDICINE

## 2024-10-20 PROCEDURE — 94640 AIRWAY INHALATION TREATMENT: CPT

## 2024-10-20 PROCEDURE — 82550 ASSAY OF CK (CPK): CPT | Performed by: INTERNAL MEDICINE

## 2024-10-20 PROCEDURE — 82570 ASSAY OF URINE CREATININE: CPT | Performed by: INTERNAL MEDICINE

## 2024-10-20 PROCEDURE — 82803 BLOOD GASES ANY COMBINATION: CPT | Performed by: HOSPITALIST

## 2024-10-20 PROCEDURE — 83935 ASSAY OF URINE OSMOLALITY: CPT | Performed by: INTERNAL MEDICINE

## 2024-10-20 PROCEDURE — 82330 ASSAY OF CALCIUM: CPT | Performed by: INTERNAL MEDICINE

## 2024-10-20 PROCEDURE — 93306 TTE W/DOPPLER COMPLETE: CPT | Performed by: HOSPITALIST

## 2024-10-20 PROCEDURE — 84132 ASSAY OF SERUM POTASSIUM: CPT | Performed by: HOSPITALIST

## 2024-10-20 PROCEDURE — 80069 RENAL FUNCTION PANEL: CPT | Performed by: INTERNAL MEDICINE

## 2024-10-20 PROCEDURE — 82330 ASSAY OF CALCIUM: CPT | Performed by: HOSPITALIST

## 2024-10-20 PROCEDURE — 83605 ASSAY OF LACTIC ACID: CPT | Performed by: HOSPITALIST

## 2024-10-20 PROCEDURE — 83605 ASSAY OF LACTIC ACID: CPT | Performed by: INTERNAL MEDICINE

## 2024-10-20 PROCEDURE — 83050 HGB METHEMOGLOBIN QUAN: CPT | Performed by: INTERNAL MEDICINE

## 2024-10-20 PROCEDURE — 81001 URINALYSIS AUTO W/SCOPE: CPT | Performed by: INTERNAL MEDICINE

## 2024-10-20 PROCEDURE — 83735 ASSAY OF MAGNESIUM: CPT | Performed by: INTERNAL MEDICINE

## 2024-10-20 PROCEDURE — 71045 X-RAY EXAM CHEST 1 VIEW: CPT | Performed by: HOSPITALIST

## 2024-10-20 RX ORDER — HYDROCODONE BITARTRATE AND ACETAMINOPHEN 5; 325 MG/1; MG/1
2 TABLET ORAL EVERY 4 HOURS PRN
Status: DISCONTINUED | OUTPATIENT
Start: 2024-10-20 | End: 2024-10-28

## 2024-10-20 RX ORDER — ALBUTEROL SULFATE 90 UG/1
2 INHALANT RESPIRATORY (INHALATION) EVERY 4 HOURS PRN
Status: DISCONTINUED | OUTPATIENT
Start: 2024-10-20 | End: 2024-10-28

## 2024-10-20 RX ORDER — DEXTROSE MONOHYDRATE 25 G/50ML
25 INJECTION, SOLUTION INTRAVENOUS
Status: ACTIVE | OUTPATIENT
Start: 2024-10-20 | End: 2024-10-20

## 2024-10-20 RX ORDER — ONDANSETRON 2 MG/ML
4 INJECTION INTRAMUSCULAR; INTRAVENOUS EVERY 6 HOURS PRN
Status: DISCONTINUED | OUTPATIENT
Start: 2024-10-20 | End: 2024-10-21

## 2024-10-20 RX ORDER — ATORVASTATIN CALCIUM 20 MG/1
20 TABLET, FILM COATED ORAL NIGHTLY
Status: DISCONTINUED | OUTPATIENT
Start: 2024-10-20 | End: 2024-10-24

## 2024-10-20 RX ORDER — ARFORMOTEROL TARTRATE 15 UG/2ML
15 SOLUTION RESPIRATORY (INHALATION)
Status: DISCONTINUED | OUTPATIENT
Start: 2024-10-20 | End: 2024-10-28

## 2024-10-20 RX ORDER — FUROSEMIDE 40 MG/1
40 TABLET ORAL DAILY
Status: DISCONTINUED | OUTPATIENT
Start: 2024-10-20 | End: 2024-10-20

## 2024-10-20 RX ORDER — LISINOPRIL 5 MG/1
5 TABLET ORAL DAILY
Status: DISCONTINUED | OUTPATIENT
Start: 2024-10-20 | End: 2024-10-20

## 2024-10-20 RX ORDER — CLOPIDOGREL BISULFATE 75 MG/1
75 TABLET ORAL DAILY
Status: DISCONTINUED | OUTPATIENT
Start: 2024-10-20 | End: 2024-10-28

## 2024-10-20 RX ORDER — BUDESONIDE 0.5 MG/2ML
0.5 INHALANT ORAL
Status: DISCONTINUED | OUTPATIENT
Start: 2024-10-20 | End: 2024-10-28

## 2024-10-20 RX ORDER — DIGOXIN 125 MCG
125 TABLET ORAL DAILY
Status: DISCONTINUED | OUTPATIENT
Start: 2024-10-20 | End: 2024-10-20

## 2024-10-20 RX ORDER — METOCLOPRAMIDE HYDROCHLORIDE 5 MG/ML
5 INJECTION INTRAMUSCULAR; INTRAVENOUS EVERY 8 HOURS PRN
Status: DISCONTINUED | OUTPATIENT
Start: 2024-10-20 | End: 2024-10-28

## 2024-10-20 RX ORDER — FUROSEMIDE 20 MG/1
20 TABLET ORAL DAILY
Status: DISCONTINUED | OUTPATIENT
Start: 2024-10-20 | End: 2024-10-20

## 2024-10-20 RX ORDER — FUROSEMIDE 10 MG/ML
40 INJECTION INTRAMUSCULAR; INTRAVENOUS DAILY
Status: DISCONTINUED | OUTPATIENT
Start: 2024-10-20 | End: 2024-10-20

## 2024-10-20 RX ORDER — CARVEDILOL 12.5 MG/1
12.5 TABLET ORAL 2 TIMES DAILY
Status: DISCONTINUED | OUTPATIENT
Start: 2024-10-20 | End: 2024-10-28

## 2024-10-20 RX ORDER — HEPARIN SODIUM 5000 [USP'U]/ML
5000 INJECTION, SOLUTION INTRAVENOUS; SUBCUTANEOUS EVERY 8 HOURS SCHEDULED
Status: DISCONTINUED | OUTPATIENT
Start: 2024-10-20 | End: 2024-10-28

## 2024-10-20 RX ORDER — DEXTROSE MONOHYDRATE 25 G/50ML
INJECTION, SOLUTION INTRAVENOUS ONCE
Status: COMPLETED | OUTPATIENT
Start: 2024-10-20 | End: 2024-10-20

## 2024-10-20 RX ORDER — SODIUM CHLORIDE 9 MG/ML
INJECTION, SOLUTION INTRAVENOUS CONTINUOUS
Status: ACTIVE | OUTPATIENT
Start: 2024-10-20 | End: 2024-10-21

## 2024-10-20 RX ORDER — PANTOPRAZOLE SODIUM 40 MG/1
40 TABLET, DELAYED RELEASE ORAL
Status: DISCONTINUED | OUTPATIENT
Start: 2024-10-20 | End: 2024-10-28

## 2024-10-20 RX ORDER — ASPIRIN 81 MG/1
81 TABLET ORAL NIGHTLY
Status: DISCONTINUED | OUTPATIENT
Start: 2024-10-20 | End: 2024-10-28

## 2024-10-20 RX ORDER — ACETAMINOPHEN 325 MG/1
650 TABLET ORAL EVERY 4 HOURS PRN
Status: DISCONTINUED | OUTPATIENT
Start: 2024-10-20 | End: 2024-10-28

## 2024-10-20 RX ORDER — IPRATROPIUM BROMIDE AND ALBUTEROL SULFATE 2.5; .5 MG/3ML; MG/3ML
SOLUTION RESPIRATORY (INHALATION)
Status: COMPLETED
Start: 2024-10-20 | End: 2024-10-20

## 2024-10-20 RX ORDER — FUROSEMIDE 20 MG/1
20 TABLET ORAL EVERY EVENING
Status: DISCONTINUED | OUTPATIENT
Start: 2024-10-20 | End: 2024-10-20

## 2024-10-20 RX ORDER — HYDROCODONE BITARTRATE AND ACETAMINOPHEN 5; 325 MG/1; MG/1
1 TABLET ORAL EVERY 4 HOURS PRN
Status: DISCONTINUED | OUTPATIENT
Start: 2024-10-20 | End: 2024-10-28

## 2024-10-20 RX ORDER — METHYLPREDNISOLONE SODIUM SUCCINATE 125 MG/2ML
80 INJECTION, POWDER, LYOPHILIZED, FOR SOLUTION INTRAMUSCULAR; INTRAVENOUS EVERY 6 HOURS
Status: COMPLETED | OUTPATIENT
Start: 2024-10-20 | End: 2024-10-22

## 2024-10-20 RX ORDER — IPRATROPIUM BROMIDE AND ALBUTEROL SULFATE 2.5; .5 MG/3ML; MG/3ML
3 SOLUTION RESPIRATORY (INHALATION)
Status: DISCONTINUED | OUTPATIENT
Start: 2024-10-20 | End: 2024-10-21

## 2024-10-20 RX ORDER — DIGOXIN 125 MCG
125 TABLET ORAL
Status: DISCONTINUED | OUTPATIENT
Start: 2024-10-21 | End: 2024-10-22

## 2024-10-20 NOTE — CONSULTS
[facility]   part of [facility]    Report of Consultation    Mingo White Patient Status:  Inpatient    1945 MRN [id number]   Location [facility] 6NE-A Attending Rehana Sanford*   Hosp Day # 0 PCP Daren Espino MD     Date of consult: 10/20/2024    REASON FOR CONSULT:     Hyperkalemia, MAIA    HISTORY OF PRESENT ILLNESS:     Mingo White is a a(n) 79 year old male former smoker w ho COPD, CHF, DM, CAD, CKD3-4 (bl Cr 1.9-2.2) who presented to ED w SOB and defirillator firing.     Follows w Dr Rogers.   He has been having increased WALTERS and self dosing of diuretics. Per his report lasix 40qam and 20qpm. No n/v/d.     Drinks 8 cups of coffee per day, but it is diluted.   No n/v/d. No nsaids.     REVIEW OF SYSTEMS:     Please see HPI for pertinent positives. 10 point review of systems otherwise reviewed and negative.     HISTORY:     Past Medical History:    Anesthesia complication    was \"out of it\" for a long time after sedation with previous colonoscopies    Back problem    Carotid artery disease (HCC)    Congestive heart disease (HCC)    CORONARY ARTERY DISEASE    Coronary atherosclerosis    DIABETES    Diabetes (HCC)    Gastric ulcer    HEART ATTACK    age 61    High blood pressure    HYPERLIPIDEMIA    Sinus drainage    with weather changes     Past Surgical History:   Procedure Laterality Date    Cardiac defibrillator placement      Medtronic dual chamber ICD, not pacemaker dependent    Colonoscopy  11 - DANNIE Espinoza    adenoma, fair prep, hemorrhoids, repeat -.    Colonoscopy  4/10/2014    DANINE Espinoza. 6 adenomas, hemorrhoids, repeat  w/ split 4L prep.     Colonoscopy  2017    Diverticulosis, Hemorrhoids.  Repeat PRN    Colonoscopy N/A 2017    Procedure: COLONOSCOPY;  Surgeon: Brad Tapia MD;  Location:  ENDOSCOPY    Open heart surgery (pbp)      Double bypass - done in [facility]    Other      repair AAA    Upper gi endoscopy performed  11 - DANNIE Espinoza     small , no H pylori.      Family History   Problem Relation Age of Onset    Heart Disorder Father     Heart Disorder Mother     Diabetes Maternal Grandmother       reports that he has been smoking cigarettes. He has a 25 pack-year smoking history. He has never used smokeless tobacco. He reports that he does not drink alcohol and does not use drugs.    ALLERGIES:     Allergies[1]    MEDICATIONS:       Current Facility-Administered Medications:     heparin (Porcine) 5000 UNIT/ML injection 5,000 Units, 5,000 Units, Subcutaneous, Q8H JENNIFFER    acetaminophen (Tylenol) tab 650 mg, 650 mg, Oral, Q4H PRN **OR** HYDROcodone-acetaminophen (Norco) 5-325 MG per tab 1 tablet, 1 tablet, Oral, Q4H PRN **OR** HYDROcodone-acetaminophen (Norco) 5-325 MG per tab 2 tablet, 2 tablet, Oral, Q4H PRN    ondansetron (Zofran) 4 MG/2ML injection 4 mg, 4 mg, Intravenous, Q6H PRN    metoclopramide (Reglan) 5 mg/mL injection 5 mg, 5 mg, Intravenous, Q8H PRN    albuterol (Ventolin HFA) 108 (90 Base) MCG/ACT inhaler 2 puff, 2 puff, Inhalation, Q4H PRN    aspirin DR tab 81 mg, 81 mg, Oral, Nightly    atorvastatin (Lipitor) tab 20 mg, 20 mg, Oral, Nightly    carvedilol (Coreg) tab 12.5 mg, 12.5 mg, Oral, BID    clopidogrel (Plavix) tab 75 mg, 75 mg, Oral, Daily    pantoprazole (Protonix) DR tab 40 mg, 40 mg, Oral, QAM AC    [Held by provider] lisinopril (Prinivil; Zestril) tab 5 mg, 5 mg, Oral, Daily    insulin aspart (NovoLOG) 100 Units/mL FlexPen 2-10 Units, 2-10 Units, Subcutaneous, TID AC and HS    [START ON 10/21/2024] digoxin (Lanoxin) tab 125 mcg, 125 mcg, Oral, Q MWF    furosemide (Lasix) 10 mg/mL injection 40 mg, 40 mg, Intravenous, Daily    ipratropium-albuterol (Duoneb) 0.5-2.5 (3) MG/3ML inhalation solution 3 mL, 3 mL, Nebulization, QID    methylPREDNISolone sodium succinate (Solu-MEDROL) injection 80 mg, 80 mg, Intravenous, Q6H    budesonide (Pulmicort) 0.5 MG/2ML nebulizer suspension 0.5 mg, 0.5 mg, Nebulization, 2 times daily     arformoterol (Brovana) 15 MCG/2ML nebulizer solution 15 mcg, 15 mcg, Nebulization, 2 times daily    fentaNYL (Sublimaze) 50 mcg/mL injection 25 mcg, 25 mcg, Intravenous, Q5 Min PRN    amiodarone (Cordarone) 450 mg in dextrose 5% 250 mL infusion, 1 mg/min, Intravenous, Continuous  No current outpatient medications on file.         PHYSICAL EXAM:     Vital Signs: /55   Pulse 60   Temp (!) 95.8 °F (35.4 °C) (Temporal)   Resp 20   Wt 106 lb 11.2 oz (48.4 kg)   SpO2 97%   BMI 17.22 kg/m²   Temp (24hrs), Av.4 °F (36.3 °C), Min:95.8 °F (35.4 °C), Max:98.1 °F (36.7 °C)       Intake/Output Summary (Last 24 hours) at 10/20/2024 1106  Last data filed at 10/20/2024 1100  Gross per 24 hour   Intake 487.76 ml   Output 200 ml   Net 287.76 ml     Wt Readings from Last 3 Encounters:   10/20/24 106 lb 11.2 oz (48.4 kg)   11/15/23 113 lb 1.6 oz (51.3 kg)   22 124 lb (56.2 kg)       General: NAD  HEENT: NCAT, MMM, EOMI  Neck: Supple   Cardiac: Regular rate and rhythm   Lungs: CTAB   Abdomen: Soft, non-tender, non-distended   : No CVA tenderness  Extremities: no leg edema  Neurologic/Psych: mentating well, no asterixis  Skin: No rashes    LABORATORY DATA:       Lab Results   Component Value Date     (H) 10/20/2024    BUN 48 (H) 10/20/2024    BUNCREA 22.0 (H) 2022    CREATSERUM 2.06 (H) 10/20/2024    ANIONGAP 5 10/20/2024    GFR 44 (L) 2017    CA 9.7 10/20/2024    OSMOCALC 300 (H) 10/20/2024    ALKPHO 93 10/19/2024    AST 15 10/19/2024    ALT 14 10/19/2024    BILT 0.3 10/19/2024    TP 7.2 10/19/2024    ALB 4.6 10/19/2024    GLOBULIN 2.6 10/19/2024    AGRATIO 1.3 2021     (L) 10/20/2024    K 5.8 (H) 10/20/2024     10/20/2024    CO2 24.0 10/20/2024     Lab Results   Component Value Date    WBC 7.5 10/19/2024    RBC 4.26 10/19/2024    HGB 12.8 (L) 10/19/2024    HCT 37.2 (L) 10/19/2024    .0 (L) 10/19/2024    MCV 87.3 10/19/2024    MCH 30.0 10/19/2024    MCHC 34.4 10/19/2024     RDW 13.6 10/19/2024    NEPRELIM 4.24 10/19/2024    NEPERCENT 56.7 10/19/2024    LYPERCENT 25.8 10/19/2024    MOPERCENT 9.9 10/19/2024    EOPERCENT 5.7 10/19/2024    BAPERCENT 1.1 10/19/2024    NE 4.24 10/19/2024    LYMABS 1.93 10/19/2024    MOABSO 0.74 10/19/2024    EOABSO 0.43 10/19/2024    BAABSO 0.08 10/19/2024     Lab Results   Component Value Date    MALBP 3.64 01/23/2018    CREUR 124.00 01/23/2018     Lab Results   Component Value Date    COLORUR Yellow 09/18/2018    CLARITY Clear 09/18/2018    SPECGRAVITY 1.019 03/14/2022    GLUUR Negative 09/18/2018    BILUR Negative 09/18/2018    KETUR Negative 09/18/2018    BLOODURINE Negative 09/18/2018    PHURINE 6.0 09/18/2018    PROUR Negative 09/18/2018    UROBILINOGEN <2.0 09/18/2018    NITRITE Negative 03/14/2022    LEUUR Negative 09/18/2018    NMIC Microscopic not indicated 09/18/2018    WBCUR None Seen 09/21/2016    RBCUR None Seen 09/21/2016    EPIUR None Seen 09/21/2016    BACUR None Seen 09/21/2016         IMAGING:     All imaging studies personally reviewed.    XR CHEST AP PORTABLE  (CPT=71045)    Result Date: 10/20/2024  CONCLUSION:  1. Mild subsegmental atelectasis within the left lung base noted. 2. Stable hyperaeration of the lungs suspicious for emphysema/COPD. 3. Stable cardiomegaly, stable changes of prior CABG and cardiac pacemaker.   LOCATION:  [facility]      Dictated by (CST): Dasha Rees DO on 10/20/2024 at 10:09 AM     Finalized by (CST): Dasha Rees DO on 10/20/2024 at 10:09 AM       XR CHEST AP PORTABLE  (CPT=71045)    Result Date: 10/19/2024  CONCLUSION:  No acute pulmonary findings.   LOCATION:  [facility]      Dictated by (CST): Michele Mead MD on 10/19/2024 at 11:30 PM     Finalized by (CST): Michele Mead MD on 10/19/2024 at 11:30 PM          ASSESSMENT/PLAN:   Mingo White is a a(n) 79 year old male former smoker w ho COPD, CHF, DM, CAD, CKD3-4 (bl Cr 1.9-2.2) who presented to ED w SOB and defirillator firing.     Hyperkalemia  --  sp lokelma, continue to redose x3 doses total today  -- Check CK and dig level  -- Hold lasix. Recommend gentle IVFs to promote kaliuresis, can ishan w lasix if needed prn. CXR w no evidence of fluid overload.   -- Glucose control   -- albuterol per pulm   -- low K diet     MAIA on CKD3  -- suspect hypovolemic from increased home diuretics vs CRS  -- appears dry on exam to me. Lips dry. No edema. CXR without edema. No crackles on exam. +mild wheezing. Pulm suspects resp distress due to COPD exac and treating w meds/steroids/bipap   -- recommend holding lisinopril and lasix   -- if ok w cards, give gentle IVF x 1L then stop   -- check UA, urine lytes, renal US w duplex, echo  -- Strict UOP   -- avoid nephrotoxins and renally dose meds     Anemia/thrombocytopenia  -- check iron stores, if deficient could be contributing to his dyspnea    Critical care time > 35min     D/w RN and Dr Sanford    Thank you for allowing me to participate in the care of your patient. Please do not hesitate to contact me with concerns or questions.    Shantell Burger MD  [facility]         [1]   Allergies  Allergen Reactions    Niaspan [Niacin, Antihyperlipidemic] ITCHING and FACE FLUSHING    Heparin UNKNOWN     States possible allergy to heparin. Blood sugar elevated when he was given heparin

## 2024-10-20 NOTE — H&P
[facility] History and Physical      Chief Complaint   Patient presents with    Arrythmia/Palpitations        PCP: Daren Espino MD      History of Present Illness: Patient is a 79 year old male with PMH sig for ischemic cardiomyopathy with an EF of 25%, history of VT status post AICD, chronic systolic heart failure, AAA status post EVAR, diabetes mellitus type 2, dyslipidemia, left renal artery stenosis presented after his ICD fired 4 times.   Yesterday he was feeling fine when he started feeling lightheaded, he sat down and felt a shock from his defibrillator.  This repeated 4 more times and he is subsequently brought in by EMS. Recently saw his cardiologist 8 days ago and had his AICD settings adjusted with a decrease in treatment threshold.  Also discussions about upgrading his device.   In the ED vitals are stable.  Labs significant for creatinine of 2.16, negative troponin and x-ray with no acute pulmonary findings.  He had another shock in the ER when the monitor showed V. tach.  Started on amiodarone infusion, cardiology consulted and admitted to cardiac ICU.    Past Medical History:    Anesthesia complication    was \"out of it\" for a long time after sedation with previous colonoscopies    Back problem    Carotid artery disease (HCC)    Congestive heart disease (HCC)    CORONARY ARTERY DISEASE    Coronary atherosclerosis    DIABETES    Diabetes (HCC)    Gastric ulcer    HEART ATTACK    age 61    High blood pressure    HYPERLIPIDEMIA    Sinus drainage    with weather changes      Past Surgical History:   Procedure Laterality Date    Cardiac defibrillator placement  2007    Medtronic dual chamber ICD, not pacemaker dependent    Colonoscopy  4/6/11 - DANNIE Espinoza    adenoma, fair prep, hemorrhoids, repeat 2013-14.    Colonoscopy  4/10/2014    DANNIE Espinoza. 6 adenomas, hemorrhoids, repeat 2017 w/ split 4L prep.     Colonoscopy  07/24/2017    Diverticulosis, Hemorrhoids.  Repeat PRN    Colonoscopy N/A 7/24/2017     Procedure: COLONOSCOPY;  Surgeon: Brad Tapia MD;  Location:  ENDOSCOPY    Open heart surgery (pbp)      Double bypass - done in [facility]    Other      repair AAA    Upper gi endoscopy performed  4/6/11 - DANNIE BLACKMAN, no H pylori.         ALL:  Allergies[1]     No current outpatient medications on file.       Social History     Tobacco Use    Smoking status: Every Day     Current packs/day: 0.50     Average packs/day: 0.5 packs/day for 50.0 years (25.0 ttl pk-yrs)     Types: Cigarettes    Smokeless tobacco: Never   Substance Use Topics    Alcohol use: No     Alcohol/week: 0.0 standard drinks of alcohol     Comment: quit 2006        Fam Hx  Family History   Problem Relation Age of Onset    Heart Disorder Father     Heart Disorder Mother     Diabetes Maternal Grandmother        Review of Systems  Comprehensive ROS reviewed and negative except for what is stated in HPI.      OBJECTIVE:  /54 (BP Location: Left arm)   Pulse 67   Temp (!) 95.8 °F (35.4 °C) (Temporal)   Resp 23   Wt 106 lb 11.2 oz (48.4 kg)   SpO2 96%   BMI 17.22 kg/m²   General:  Alert, no distress, appears stated age.    Head:  Normocephalic, without obvious abnormality, atraumatic.   Eyes:  Sclera anicteric, No conjunctival pallor, EOMs intact.    Nose: Nares normal. Septum midline. Mucosa normal. No drainage.   Throat: Lips, mucosa, and tongue with dry oral mucosa. Teeth and gums normal.   Neck: Supple, symmetrical, trachea midline, no cervical or supraclavicular lymph adenopathy, thyroid: no enlargment/tenderness/nodules appreciated   Lungs:   Diffuse end expiratory wheezing, increased effort   Chest wall:  No tenderness or deformity.   Heart:  Regular rate and rhythm, S1, S2 normal, no murmur, rub or gallop appreciated   Abdomen:   Soft, non-tender. Bowel sounds normal. No masses,  No organomegaly. Non distended   Extremities: Extremities normal, atraumatic, no cyanosis or edema.   Skin: Skin color, texture, turgor normal.  No rashes or lesions.    Neurologic: Normal strength, no focal deficit appreciated     Data Review:    LABS:   Lab Results   Component Value Date    WBC 7.5 10/19/2024    HGB 12.8 10/19/2024    HCT 37.2 10/19/2024    .0 10/19/2024    CREATSERUM 1.97 10/20/2024    BUN 50 10/20/2024     10/20/2024    K 5.7 10/20/2024     10/20/2024    CO2 25.0 10/20/2024     10/20/2024    CA 9.8 10/20/2024    ALB 4.6 10/19/2024    ALKPHO 93 10/19/2024    BILT 0.3 10/19/2024    TP 7.2 10/19/2024    AST 15 10/19/2024    ALT 14 10/19/2024    PTT 48.9 10/19/2024    INR 1.01 10/19/2024    PTP 13.4 10/19/2024    PGLU 167 10/20/2024       CXR: All imaging personally reviewed.      Radiology: XR CHEST AP PORTABLE  (CPT=71045)    Result Date: 10/19/2024  PROCEDURE:  XR CHEST AP PORTABLE  (CPT=71045)  TECHNIQUE:  AP chest radiograph was obtained.  COMPARISON:  [facility] , NIRMAL, XR CHEST AP PORTABLE  (CPT=71045), 11/14/2023, 0:18 AM.  [facility], XR, CHEST PA   LATERAL, 9/19/2008, 8:52 AM.  INDICATIONS:  ICD fired mulitple times  PATIENT STATED HISTORY: (As transcribed by Technologist)  Patient arrives from home due to ICD going off multiple times. Patient offered no additional history at this times.    FINDINGS:  Stable left-sided AICD. Cardiac size and pulmonary vasculature are within normal limits. No pleural effusions. No pneumothorax.  Median sternotomy approximated by wire sutures.             CONCLUSION:  No acute pulmonary findings.   LOCATION:  [facility]      Dictated by (CST): Michele Mead MD on 10/19/2024 at 11:30 PM     Finalized by (CST): Michele Mead MD on 10/19/2024 at 11:30 PM          Assessment/Plan:     79 year old male with PMH sig for ischemic cardiomyopathy with an EF of 25%, history of VT status post AICD, chronic systolic heart failure, AAA status post EVAR, diabetes mellitus type 2, dyslipidemia, left renal artery stenosis presented after his ICD fired 4 times.     Ventricular tachycardia with  appropriate shocks  S/p DCICD  - cont amio infusion, wean per cardiology  - coreg  - order echo    Acute Hypercapnic Respiratory failure from acute COPD exacerbation  - consult pulmonology  - start bipap   - IV steroids, duonebs q6h  - obtain CXR  - hold further diuresis for now     ICMP EF25%  Chronic systolic heart failure  - given IV lasix this AM, however looks dry, hold further diuretics for now   - daily weights, I/Os  - coreg  - maximize GDMT as bp permits    MAIA on CKD with hyperkalemia  - lokelma x3 doses per nephrology   - hold further diuretics, may need fluids  - hold lisinopril   - check UA  - I/Os, UOP  - nephrology following     DM2  - ISS/accucheks    Hyperlipidemia  - statin    AAA s/p EVAR  - stable    FEN: regular diet, PT/OT  Proph: SCDs, heparin  Code status: Full code     Outpatient records or previous hospital records reviewed.   [facility] hospitalist to continue to follow patient while in house      Namita Sanford MD  [facility]  Hospitalist  Message over Retention Education/[employer] Services (E[employer])  Pager: [phone]        **Certification      PHYSICIAN Certification of Need for Inpatient Hospitalization - Initial Certification    Patient will require inpatient services that will reasonably be expected to span two midnight's based on the clinical documentation in H+P.   Based on patients current state of illness, I anticipate that, after discharge, patient will require TBD.         [1]   Allergies  Allergen Reactions    Niaspan [Niacin, Antihyperlipidemic] ITCHING and FACE FLUSHING    Heparin UNKNOWN     States possible allergy to heparin. Blood sugar elevated when he was given heparin

## 2024-10-20 NOTE — PLAN OF CARE
Received pt from ER. A&O x4, follows commands. Denies any pain. C/o WALTERS, 1-2 L NC. A-V paced. Noted a run of V-tach (17-beats), pt awake & asymptomatic. Stated he did not feel his ICD fire. A-V paced. Amiodarone gtt infusing per order. BP stable. Voids per urinal. Bedrest for tonight. Updated on POC.    K+ in am 5.7. Lokelma given. K+ recheck ordered for noon.

## 2024-10-20 NOTE — PLAN OF CARE
Assumed care of patient at 0730. Cooperative, pleasant, anxious/apprehensive. Says he \"feels like he's suffocating.\" Here for VT storm s/p 5 ICD shocks (4 at home, 1 in ED) and CHF exacerbation. On assessment, A&Ox4, labored breathing w/ subcostal retractions, O2 increased to 5L NC. Crackles throughout and diminished anteriorly and posteriorly noting restricted air movement. Dr. Sanford notified, consult placed to [facility], ABG, and repeat CXR ordered.    Nephro and pulm consulting physicians notified. Dr. Clemons w/ pulm at bedside.    0920 ABG on 5L NC: 7.21/59/102/20.8, placed on BiPAP 16/6 30% per pulm. IV steroids and nebs per pulm, repeating ABG in 1h.

## 2024-10-20 NOTE — ED QUICK NOTES
Orders for admission, patient is aware of plan and ready to go upstairs. Any questions, please call ED STORMY Short  at extension [phone].     Vaccinated?  Type of COVID test sent:  COVID Suspicion level: Low/High      Titratable drug(s) infusing:  Rate: amniodorone    LOC at time of transport:A+Ox4    Other pertinent information:    CIWA score=  NIH score=

## 2024-10-20 NOTE — CONSULTS
[facility] Cardiology  Report of Consultation    Mingo White Patient Status:  Inpatient    1945 MRN [id number]   Location [facility] 6NE-A Attending Rehana Sanford*   Hosp Day # 0 PCP Daren Espino MD     Reason for Consultation:  CHF     History of Present Illness:  Mingo White is a a(n) 79 year old male with history of a cardiomyopathy with ejection fraction approximately 15%, coronary artery disease, ICD placement, renal insufficiency, COPD who presented with defibrillator discharge.  Patient has been short of breath and has been self dosing steroids.  On arrival here, patient's potassium was 6.1.  He was also hypercapnic and acidotic.  He was admitted to the CCU.  Currently he is on BiPAP and feeling better.    Patient denies any ischemic chest pain recently. He denies any recent fevers or cough.    Patient is followed by Dr. Joselito Rogers in our group for his congestive heart failure and duly cardiology for his electrophysiology needs.    History:  Past Medical History:    Anesthesia complication    was \"out of it\" for a long time after sedation with previous colonoscopies    Back problem    Carotid artery disease (HCC)    Congestive heart disease (HCC)    CORONARY ARTERY DISEASE    Coronary atherosclerosis    DIABETES    Diabetes (HCC)    Gastric ulcer    HEART ATTACK    age 61    High blood pressure    HYPERLIPIDEMIA    Sinus drainage    with weather changes     Past Surgical History:   Procedure Laterality Date    Cardiac defibrillator placement      Medtronic dual chamber ICD, not pacemaker dependent    Colonoscopy  11 - DANNIE Espinoza    adenoma, fair prep, hemorrhoids, repeat -.    Colonoscopy  4/10/2014    DANNIE Espinoza. 6 adenomas, hemorrhoids, repeat  w/ split 4L prep.     Colonoscopy  2017    Diverticulosis, Hemorrhoids.  Repeat PRN    Colonoscopy N/A 2017    Procedure: COLONOSCOPY;  Surgeon: Brad Tapia MD;  Location:  ENDOSCOPY    Open heart surgery (pbp)       Double bypass - done in [facility]    Other      repair AAA    Upper gi endoscopy performed  4/6/11 - DANNIE BLACKMAN, no H pylori.      Family History   Problem Relation Age of Onset    Heart Disorder Father     Heart Disorder Mother     Diabetes Maternal Grandmother       reports that he has been smoking cigarettes. He has a 25 pack-year smoking history. He has never used smokeless tobacco. He reports that he does not drink alcohol and does not use drugs.    Allergies:  Allergies[1]    Medications:    Current Facility-Administered Medications:     heparin (Porcine) 5000 UNIT/ML injection 5,000 Units, 5,000 Units, Subcutaneous, Q8H JENNIFFER    acetaminophen (Tylenol) tab 650 mg, 650 mg, Oral, Q4H PRN **OR** HYDROcodone-acetaminophen (Norco) 5-325 MG per tab 1 tablet, 1 tablet, Oral, Q4H PRN **OR** HYDROcodone-acetaminophen (Norco) 5-325 MG per tab 2 tablet, 2 tablet, Oral, Q4H PRN    ondansetron (Zofran) 4 MG/2ML injection 4 mg, 4 mg, Intravenous, Q6H PRN    metoclopramide (Reglan) 5 mg/mL injection 5 mg, 5 mg, Intravenous, Q8H PRN    albuterol (Ventolin HFA) 108 (90 Base) MCG/ACT inhaler 2 puff, 2 puff, Inhalation, Q4H PRN    aspirin DR tab 81 mg, 81 mg, Oral, Nightly    atorvastatin (Lipitor) tab 20 mg, 20 mg, Oral, Nightly    carvedilol (Coreg) tab 12.5 mg, 12.5 mg, Oral, BID    clopidogrel (Plavix) tab 75 mg, 75 mg, Oral, Daily    pantoprazole (Protonix) DR tab 40 mg, 40 mg, Oral, QAM AC    insulin aspart (NovoLOG) 100 Units/mL FlexPen 2-10 Units, 2-10 Units, Subcutaneous, TID AC and HS    [START ON 10/21/2024] digoxin (Lanoxin) tab 125 mcg, 125 mcg, Oral, Q MWF    furosemide (Lasix) 10 mg/mL injection 40 mg, 40 mg, Intravenous, Daily    ipratropium-albuterol (Duoneb) 0.5-2.5 (3) MG/3ML inhalation solution 3 mL, 3 mL, Nebulization, QID    methylPREDNISolone sodium succinate (Solu-MEDROL) injection 80 mg, 80 mg, Intravenous, Q6H    budesonide (Pulmicort) 0.5 MG/2ML nebulizer suspension 0.5 mg, 0.5 mg,  Nebulization, 2 times daily    arformoterol (Brovana) 15 MCG/2ML nebulizer solution 15 mcg, 15 mcg, Nebulization, 2 times daily    sodium chloride 0.9% infusion, , Intravenous, Continuous    sodium zirconium cyclosilicate (Lokelma) oral packet 10 g, 10 g, Oral, Q8H    fentaNYL (Sublimaze) 50 mcg/mL injection 25 mcg, 25 mcg, Intravenous, Q5 Min PRN    amiodarone (Cordarone) 450 mg in dextrose 5% 250 mL infusion, 1 mg/min, Intravenous, Continuous    Review of Systems:  A comprehensive 10 point review of systems was completed.  Pertinent positives and negatives noted in the the HPI.     Physical Exam:  Blood pressure 119/63, pulse 60, temperature 97.2 °F (36.2 °C), temperature source Temporal, resp. rate 16, weight 106 lb 11.2 oz (48.4 kg), SpO2 99%.  Temp (24hrs), Av.2 °F (36.2 °C), Min:95.8 °F (35.4 °C), Max:98.1 °F (36.7 °C)    Wt Readings from Last 3 Encounters:   10/20/24 106 lb 11.2 oz (48.4 kg)   11/15/23 113 lb 1.6 oz (51.3 kg)   22 124 lb (56.2 kg)       Telemetry: Sinus  General: Alert and oriented in no apparent distress.  HEENT: EOMI, no scleral icterus, mucosa moist  Neck: Supple, carotids 2+   Cardiac: Regular rate and rhythm   Lungs: Diminished throughout  Abdomen: Soft, non-tender.   Extremities: Without clubbing, cyanosis or edema  Neurologic: Alert and oriented, normal affect.  Skin: Warm and dry.     Laboratories and Data:  Diagnostics:    Labs:   Recent Labs   Lab 10/20/24  1145   CK 46      Recent Labs   Lab 10/19/24  2243   RBC 4.26   HGB 12.8*   HCT 37.2*   MCV 87.3   MCH 30.0   MCHC 34.4   RDW 13.6   NEPRELIM 4.24   WBC 7.5   .0*     Recent Labs   Lab 10/19/24  2243 10/20/24  0416 10/20/24  0851 10/20/24  1145 10/20/24  1456   * 178* 237* 242*  --    BUN 48* 50* 48* 41*  --    CREATSERUM 2.16* 1.97* 2.06* 2.16*  --    CA 10.2 9.8 9.7 9.6  --    ALB 4.6  --   --  4.3  --     134* 135* 133*  --    K 4.9 5.7* 5.8* 6.1* 4.5    107 106 107  --    CO2 27.0 25.0 24.0  22.0  --    ALKPHO 93  --   --   --   --    AST 15  --   --   --   --    ALT 14  --   --   --   --    BILT 0.3  --   --   --   --    TP 7.2  --   --   --   --       Lab Results   Component Value Date    INR 1.01 10/19/2024    INR 1.0 (L) 08/08/2008    INR 1.0 (L) 12/11/2007        Assessment/Plan:    CV -patient is ischemic cardiomyopathy.  I reviewed his echo.  Ejection fraction is no different.  Clinically, he does not appear to be significantly volume overloaded.  He may be a bit on the dry side.  He is getting some slow hydration by renal and seems to be tolerating it.  He had defibrillator shocks presumably for VT.  He has been seen by Dr. Ureña and is currently on IV amiodarone drip.  We will initiate his usual medication regimen and reassess tomorrow morning.  I will send a dig level today.  Renal insufficiency -baseline creatinine close to 2.  He apparently does better with creatinine in this range.  ACE inhibitor's are being held due to his high potassium.  History of COPD -felt to have an acute exacerbation.  Is currently on BiPAP and oxygenating well and appears quite comfortable.  He has been seen by Dr. Deonte Zuniga MD  10/20/2024  5:13 PM  C5         [1]   Allergies  Allergen Reactions    Niaspan [Niacin, Antihyperlipidemic] ITCHING and FACE FLUSHING    Heparin UNKNOWN     States possible allergy to heparin. Blood sugar elevated when he was given heparin
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
## 2024-10-20 NOTE — ED QUICK NOTES
Pt's ICD interrogated, information sent electronically. Pt is calm and denies pain. No distress. Vss.

## 2024-10-20 NOTE — CONSULTS
I was asked by Dr. Cruz to evaluate for VT    Mingo Whtie is a 79 year old male with PMhx as below referred for VT.  Long standing CM, recently saw Dr Pedroza with slow VT with plan for LV lead upgrade.  Cutoff lowered to allow for VT to be treated and yesterday had shocks x 4.    Also seeing Dr Perera for CHF  (no records available) and he has been having increasing WALTERS with self dosing of diuretics.  Per his report lasix 40qam and 20qpm prn.    No device interrogation available, per report it showed \"normal function\".  He was started on an amio gtt in the ED.  Claims compliance with home meds.    The patient otherwise denies chest pain, shortness of breath, palpitations, lightheadedness, syncope, orthopnea, paroxysmal nocturnal dyspnea, or edema.    All systems were reviewed and are negative except as described above.         Past Medical History:    Anesthesia complication    was \"out of it\" for a long time after sedation with previous colonoscopies    Back problem    Carotid artery disease (HCC)    Congestive heart disease (HCC)    CORONARY ARTERY DISEASE    Coronary atherosclerosis    DIABETES    Diabetes (HCC)    Gastric ulcer    HEART ATTACK    age 61    High blood pressure    HYPERLIPIDEMIA    Sinus drainage    with weather changes         Medications:  No current outpatient medications on file.       Social: The Patient denies tobacco, illicit drug use, and alcohol abuse    Family: The patient denies a history of sudden cardiac death or premature coronary artery disease    Allergies:  Allergies[1]       Physical:  /59   Pulse 64   Temp 98.1 °F (36.7 °C) (Temporal)   Resp 16   Wt 106 lb 11.2 oz (48.4 kg)   SpO2 95%   BMI 17.22 kg/m²   GENERAL: well developed, well nourished, in no apparent distress  EYES: sclera anicteric  HEENT: normocephalic  NECK: no JVD, no carotid bruits, no thyromegaly  RESPIRATORY: clear to auscultation  CARDIOVASCULAR: S1, S2 normal, RRR; no S3, no S4; no  click; no murmur  ABDOMEN: normal active BS, soft, nondistended; nontender  EXTREMITIES: no cyanosis, clubbing or edema, peripheral pulses intact  NEURO: no sensorimotor deficits  PSYCHIATRIC: alert and oriented x 3, affect normal  SKIN: no rashes    Data:  No results found for: \"TSH\"  BUN (mg/dL)   Date Value   10/20/2024 50 (H)   10/19/2024 48 (H)     Blood Urea Nitrogen (mg/dL)   Date Value   03/14/2022 30.0 (H)   11/16/2021 36.0 (H)   05/17/2016 31 (H)   01/20/2016 28 (H)     Creatinine, Serum (mg/dL)   Date Value   05/17/2016 1.38 (H)   01/20/2016 1.35 (H)     Creatinine (mg/dL)   Date Value   10/20/2024 1.97 (H)   10/19/2024 2.16 (H)   03/14/2022 1.39 (H)   11/16/2021 1.50 (H)       ECG: (personally reviewed): NSR LAE V pacing QRS > 170  Tele - NSVT x 20 beats  Event:  TTE: 11/23:  1. Left ventricle: The cavity size was increased. Wall thickness was normal.      The estimated ejection fraction was 15-20%. There was diffuse hypokinesis      with regional variations. Left ventricular diastolic function is      indeterminate.   2. Right ventricle: Pacer wire noted in the right ventricle.   3. Left atrium: The left atrial volume was mildly increased.   4. Right atrium: Pacer wire noted in right atrium.     Impression:  ICM s/p DCICD 2007,   VT - per report approrriate shocks, now on amio gtt  CKD Cr 2's  Hyperkalemia on intake labs      Plan:  ICM - appears sob at baseline, has orthopnea.  Creatinine elevated.  At home taking lasix.  Change to IV today for marked WALTERS.  Taking coreg, lis per report, not on further GDMT at this time for unclear reasons.  Change lasix to IV, watch on tele.  Consider further GDMT based on course and Cr limitations.  VT - obtain formal interrogation.  Cont amio gtt, repeat K, cont coreg ACE  CKD - consult renal.  Cardiorenal picture  Hyperkalemia - repeat K.         [1]   Allergies  Allergen Reactions    Niaspan [Niacin, Antihyperlipidemic] ITCHING and FACE FLUSHING    Heparin UNKNOWN      States possible allergy to heparin. Blood sugar elevated when he was given heparin

## 2024-10-20 NOTE — ED PROVIDER NOTES
Patient Seen in: [facility] Emergency Department      History     Chief Complaint   Patient presents with    Arrythmia/Palpitations     Stated Complaint: ICD fired mulitple times    Subjective:   HPI    Patient is an 79-year-old gentleman with previous history of AICD placement due to history of cardiac arrhythmia secondary to ischemic cardiomyopathy.  Review of records reveals patient's most recent echocardiogram revealed ejection fraction of 15 to 20%.  Recently patient had seen his cardiologist about 8 days ago and at that time had an adjustment of his AICD with decrease of his treatment threshold by 36 bpm to 162.  There was also discussion of the patient possibly needing a different device.  He states that he was feeling fine this evening and all of a sudden had an episode of lightheadedness, he sat down and felt a shock from his defibrillator.  This occurred 4 more times send the patient is now brought in by paramedics.  He denies any chest pain, difficulty breathing, lower extremity swelling, or other complaints.    This patient was being placed on our cardiac monitor another shock was delivered while the patient continued to be conversant without interruption in conversation.  Patient states that he did not feel lightheaded or any other symptoms when the device fired.    Additional information obtained from paramedic.        Objective:   Past Medical History:    Anesthesia complication    was \"out of it\" for a long time after sedation with previous colonoscopies    Back problem    Carotid artery disease (HCC)    Congestive heart disease (HCC)    CORONARY ARTERY DISEASE    Coronary atherosclerosis    DIABETES    Diabetes (HCC)    Gastric ulcer    HEART ATTACK    age 61    High blood pressure    HYPERLIPIDEMIA    Sinus drainage    with weather changes              Past Surgical History:   Procedure Laterality Date    Cardiac defibrillator placement  2007    Medtronic dual chamber ICD, not pacemaker  dependent    Colonoscopy  4/6/11 - DANNIE Espinoza    adenoma, fair prep, hemorrhoids, repeat 2013-14.    Colonoscopy  4/10/2014    DANNIE Espinoza. 6 adenomas, hemorrhoids, repeat 2017 w/ split 4L prep.     Colonoscopy  07/24/2017    Diverticulosis, Hemorrhoids.  Repeat PRN    Colonoscopy N/A 7/24/2017    Procedure: COLONOSCOPY;  Surgeon: Brad Tapia MD;  Location:  ENDOSCOPY    Open heart surgery (pbp)      Double bypass - done in [facility]    Other      repair AAA    Upper gi endoscopy performed  4/6/11 - DANNIE Espinoza    small , no H pylori.                 Social History     Socioeconomic History    Marital status:    Occupational History    Occupation: chemical technologist     Comment: retire age 54   Tobacco Use    Smoking status: Every Day     Current packs/day: 0.50     Average packs/day: 0.5 packs/day for 50.0 years (25.0 ttl pk-yrs)     Types: Cigarettes    Smokeless tobacco: Never   Vaping Use    Vaping status: Never Used   Substance and Sexual Activity    Alcohol use: No     Alcohol/week: 0.0 standard drinks of alcohol     Comment: quit 2006    Drug use: No   Other Topics Concern     Service Yes     Comment: [employer] - boiling D[employer] exposure    Blood Transfusions No    Caffeine Concern No    Occupational Exposure No    Hobby Hazards No    Sleep Concern No    Stress Concern No    Weight Concern No    Special Diet Yes    Back Care Yes    Exercise Yes    Seat Belt Yes   Social History Narrative    Likes to do crossword puzzles    Active with grand kids and their sports    Socializes with friends     Social Drivers of Health     Food Insecurity: No Food Insecurity (11/14/2023)    Food Insecurity     Food Insecurity: Never true   Transportation Needs: No Transportation Needs (11/14/2023)    Transportation Needs     Lack of Transportation: No   Housing Stability: Low Risk  (11/14/2023)    Housing Stability     Housing Instability: No              Review of Systems    Positive for stated complaint: ICD  fired mulitple times  Other systems are as noted in HPI.  Constitutional and vital signs reviewed.      All other systems reviewed and negative except as noted above.    Physical Exam     ED Triage Vitals   BP 10/19/24 2242 129/62   Pulse 10/19/24 2239 84   Resp 10/19/24 2239 16   Temp 10/19/24 2239 97.8 °F (36.6 °C)   Temp src 10/19/24 2239 Temporal   SpO2 10/19/24 2239 100 %   O2 Device 10/19/24 2239 None (Room air)       Current:/51   Pulse 70   Temp 97.8 °F (36.6 °C) (Temporal)   Resp 21   Wt 51 kg   SpO2 95%   BMI 18.15 kg/m²         Physical Exam    Constitutional: No apparent distress  Eyes: No scleral icterus  Heart: regular rate rhythm, no murmurs  Lungs: Clear to auscultation bilaterally  Abdomen: Soft and nontender  Extremities no edema  Skin: No rash  Neuro: Alert and oriented ×3          ED Course/ My interpretations:     Labs Reviewed   COMP METABOLIC PANEL (14) - Abnormal; Notable for the following components:       Result Value    Glucose 147 (*)     BUN 48 (*)     Creatinine 2.16 (*)     Calculated Osmolality 299 (*)     eGFR-Cr 30 (*)     All other components within normal limits   CBC WITH DIFFERENTIAL WITH PLATELET - Abnormal; Notable for the following components:    HGB 12.8 (*)     HCT 37.2 (*)     .0 (*)     All other components within normal limits   PTT, ACTIVATED - Abnormal; Notable for the following components:    PTT 48.9 (*)     All other components within normal limits   PROTHROMBIN TIME (PT) - Normal   DIGOXIN (LANOXIN) - Normal   TROPONIN I HIGH SENSITIVITY - Normal   RAINBOW DRAW LAVENDER   RAINBOW DRAW LIGHT GREEN   RAINBOW DRAW GOLD   RAINBOW DRAW BLUE   RAPID SARS-COV-2 BY PCR     EKG    Rate, intervals and axes as noted on EKG Report.  Rate: 78  Rhythm: Sinus Rhythm  Reading: Sinus rhythm with occasional PVCs, nonspecific intraventricular block, no significant change from prior EKG performed on November 14, 2023.         Telemetry review revealed run of V. tach  successfully treated by patient AICD, which noted by staff by staff.  Patient's device was interrogated.  [employer] contacted me over the phone with the report of 11 episodes noted tonight several of them treated successfully by the AICD.  Somewhere somewhat ongoing with the heart rate remaining below 162 bpm zone.  My independent imaging interpretation:                                  XR CHEST AP PORTABLE  (CPT=71045)      No pneumonia, no pulmonary edema.  All available radiology reports for this visit reviewed.  AICD is in stable position per radiologist.      Medications given:  Orders Placed This Encounter    Comp Metabolic Panel (14)    CBC With Differential With Platelet    Prothrombin Time (PT)    PTT, Activated    Digoxin (Lanoxin)    Troponin I (High Sensitivity)    fentaNYL (Sublimaze) 50 mcg/mL injection 25 mcg    amiodarone (Cordarone) 150 MG/3ML injection 150 mg    amiodarone (Cordarone) 450 mg in dextrose 5% 250 mL infusion    amiodarone (Cordarone) 150 MG/3ML injection 150 mg                         MDM      Extensive differential diagnosis was considered for the patient including ventricular fibrillation, ventricular tachycardia, SVT, AICD malfunction, and other pathology.    Patient's pacer witnessed to deliver appropriate shock as patient showing V. tach on monitor.  Interrogation also supports proper function of the device.    Patient given amiodarone.  Subsequently placed on drip and consultation with cardiology, Dr. Doss.    Admitted to intensive care unit for further care.    Hospitalist also consulted.    Admission disposition: 10/19/2024 11:58 PM           Disposition and Plan     Clinical Impression:  1. Ventricular tachyarrhythmia (HCC)    2. Ischemic cardiomyopathy with implantable cardioverter-defibrillator (ICD)         Disposition:  Admit  10/19/2024 11:58 pm    Follow-up:  No follow-up provider specified.        Medications Prescribed:  Current Discharge Medication List               Supplementary Documentation:         Hospital Problems       Present on Admission  Date Reviewed: 3/21/2022            ICD-10-CM Noted POA    V-tach (HCC) I47.20 10/19/2024 Unknown                         Approximately 55 minutes of critical care time (exclusive of billable procedures) was administered to manage the patient's unstable vital signs due to her hypotension. This involved direct patient intervention, complex decision making, and/or extensive discussions with the patient, family, and clinical staff.                    Hospital Problems       Present on Admission  Date Reviewed: 3/21/2022            ICD-10-CM Noted POA    V-tach (HCC) I47.20 10/19/2024 Unknown           Documentation created with the aid of Dragon voice recognition software.  Although efforts were made to ensure the accuracy of the note, some inaccuracies may persist.

## 2024-10-20 NOTE — CONSULTS
Critical Care H&P/Consult       NAME: Mingo White - ROOM: [address] [place] [address]-A - MRN: [id number] - Age: 79 year old - :  1945    Date of Admission: 10/19/2024 10:37 PM  Admission Diagnosis: Ventricular tachyarrhythmia (HCC) [I47.20]  Ischemic cardiomyopathy with implantable cardioverter-defibrillator (ICD) [I25.5, Z95.810]  Reason for Consult: Hypercapnic Resp Failure      History of Present Illness: 78 y/o w/ h/o COPD, CHF, CAD, DM presented to EDW w/ c/o SOB and his defibrillator firing.  Pt is not able to say how long he has had the SOB for.  In the ED he received another shock.  He had a chest x-ray that showed no evidence of fluid overload.  His labs showed CKD, mild anemia and chronic TCP.  This AM his K was noted to be higher and he continued to have SOB and now wheezing.   An ABG was checked and the pt was noted to have a mixed, though primarily respiratory, acidosis.      Past Medical History:    Anesthesia complication    was \"out of it\" for a long time after sedation with previous colonoscopies    Back problem    Carotid artery disease (HCC)    Congestive heart disease (HCC)    CORONARY ARTERY DISEASE    Coronary atherosclerosis    DIABETES    Diabetes (HCC)    Gastric ulcer    HEART ATTACK    age 61    High blood pressure    HYPERLIPIDEMIA    Sinus drainage    with weather changes      Past Surgical History:   Procedure Laterality Date    Cardiac defibrillator placement      Medtronic dual chamber ICD, not pacemaker dependent    Colonoscopy  11 - DANNIE Espinoza    adenoma, fair prep, hemorrhoids, repeat -.    Colonoscopy  4/10/2014    DANNIE Espinoza. 6 adenomas, hemorrhoids, repeat  w/ split 4L prep.     Colonoscopy  2017    Diverticulosis, Hemorrhoids.  Repeat PRN    Colonoscopy N/A 2017    Procedure: COLONOSCOPY;  Surgeon: Brad Tapia MD;  Location:  ENDOSCOPY    Open heart surgery (pbp)      Double bypass - done in [facility]    Other      repair AAA    Upper gi endoscopy performed   4/6/11 - DANNIE BLACKMAN, no H pylori.       Prescriptions Prior to Admission[1]  Allergies[2]    Social History:  Social History     Socioeconomic History    Marital status:      Spouse name: Not on file    Number of children: Not on file    Years of education: Not on file    Highest education level: Not on file   Occupational History    Occupation: chemical technologist     Comment: retire age 54   Tobacco Use    Smoking status: Every Day     Current packs/day: 0.50     Average packs/day: 0.5 packs/day for 50.0 years (25.0 ttl pk-yrs)     Types: Cigarettes    Smokeless tobacco: Never   Vaping Use    Vaping status: Never Used   Substance and Sexual Activity    Alcohol use: No     Alcohol/week: 0.0 standard drinks of alcohol     Comment: quit 2006    Drug use: No    Sexual activity: Not on file   Other Topics Concern     Service Yes     Comment: [employer] - boiling D[employer] exposure    Blood Transfusions No    Caffeine Concern No    Occupational Exposure No    Hobby Hazards No    Sleep Concern No    Stress Concern No    Weight Concern No    Special Diet Yes    Back Care Yes    Exercise Yes    Bike Helmet Not Asked    Seat Belt Yes    Self-Exams Not Asked   Social History Narrative    Likes to do crossword puzzles    Active with grand kids and their sports    Socializes with friends     Social Drivers of Health     Financial Resource Strain: Not on file   Food Insecurity: No Food Insecurity (11/14/2023)    Food Insecurity     Food Insecurity: Never true   Transportation Needs: No Transportation Needs (11/14/2023)    Transportation Needs     Lack of Transportation: No   Physical Activity: Not on file   Stress: Not on file   Social Connections: Not on file   Housing Stability: Low Risk  (11/14/2023)    Housing Stability     Housing Instability: No     Housing Instability Emergency: Not on file     Crib or Bassinette: Not on file        Family History:  Family History   Problem Relation Age of Onset     Heart Disorder Father     Heart Disorder Mother     Diabetes Maternal Grandmother         Home Medications:  Medications Taking[3]    Scheduled Medication:   heparin  5,000 Units Subcutaneous Q8H JENNIFFER    aspirin  81 mg Oral Nightly    atorvastatin  20 mg Oral Nightly    carvedilol  12.5 mg Oral BID    clopidogrel  75 mg Oral Daily    pantoprazole  40 mg Oral QAM AC    [Held by provider] lisinopril  5 mg Oral Daily    insulin aspart  2-10 Units Subcutaneous TID AC and HS    [START ON 10/21/2024] digoxin  125 mcg Oral Q MWF    furosemide  40 mg Intravenous Daily    ipratropium-albuterol  3 mL Nebulization QID    methylPREDNISolone  80 mg Intravenous Q6H    budesonide  0.5 mg Nebulization 2 times daily    arformoterol  15 mcg Nebulization 2 times daily     Continuous Infusing Medication:   amiodarone 1 mg/min (10/20/24 0650)     PRN Medication:  acetaminophen **OR** HYDROcodone-acetaminophen **OR** HYDROcodone-acetaminophen    ondansetron    metoclopramide    albuterol    fentaNYL     REVIEW OF SYSTEMS:   14 point ROS conducted, negative save for above     OBJECTIVE:  Vitals:    10/20/24 0700 10/20/24 0800 10/20/24 0900 10/20/24 0925   BP: 124/59 119/54  134/58   BP Location:  Left arm     Pulse: 64 67 61 60   Resp: 16 23 (!) 27 (!) 30   Temp:  (!) 95.8 °F (35.4 °C)     TempSrc:  Temporal     SpO2: 95% 96% 97% 96%   Weight:           Oxygen Therapy  SpO2: 96 %  O2 Device: Nasal cannula  O2 Flow Rate (L/min): 5 L/min  Pulse Oximetry Type: Continuous  Oximetry Probe Site Changed: No  Pulse Ox Probe Location: Right hand                            Wt Readings from Last 3 Encounters:   10/20/24 106 lb 11.2 oz (48.4 kg)   11/15/23 113 lb 1.6 oz (51.3 kg)   03/21/22 124 lb (56.2 kg)         Intake/Output Summary (Last 24 hours) at 10/20/2024 0939  Last data filed at 10/20/2024 0921  Gross per 24 hour   Intake 229 ml   Output 200 ml   Net 29 ml       /58   Pulse 60   Temp (!) 95.8 °F (35.4 °C) (Temporal)   Resp (!) 27    Wt 106 lb 11.2 oz (48.4 kg)   SpO2 94%   BMI 17.22 kg/m²     General Appearance:    Alert, cooperative, appears stated age   Head:    Normocephalic, without obvious abnormality, atraumatic   Eyes:    PERRL, conjunctiva/corneas clear, EOM's intact, both eyes   Throat:   Lips, mucosa, and tongue normal; teeth and gums normal   Neck:   Supple, symmetrical, trachea midline, no adenopathy;        thyroid:  No enlargement/tenderness/nodules; no carotid    bruit or JVD   Back:     Symmetric, no curvature, ROM normal, no CVA tenderness   Lungs:     Diminished Posteriorly, exp wheezes in anterior fields   Chest wall:    No tenderness or deformity   Heart:    Regular rate and rhythm, S1 and S2 normal, no murmur, rub   or gallop   Abdomen:     Soft, non-tender, bowel sounds active all four quadrants,     no masses, no organomegaly   Extremities:   Extremities normal, atraumatic, no cyanosis or edema   Pulses:   2+ and symmetric all extremities   Skin:   Skin color, texture, turgor normal, no rashes or lesions   Neurologic:   CNII-XII intact. Normal strength, sensation and reflexes       throughout       Labs reviewed as noted below    Imaging: chest x-ray reviewed as noted above    ASSESSMENT/PLAN:  Acute Hypercapnic resp failure  -?COPD exacerbation, does not appear to have pulm edema though repeat x-ray is pending  -start on BiPAP  -repeat ABG in 30 min  COPD Exacerbation  -start IV steroids  -start nebulized ICS/LABA- would look to transition to LAMA/LABA once off BiPAP- should cont this as opt  -BD protocol  CHF w/ mult AICD firings  -Cards following  -monitor electrolytes  CKD  -renal to see        Medically Necessary and Clinically Appropriate Critical Care Time: 35 minutes                 [facility]  Pulmonary and Critical Care       [1]   Medications Prior to Admission   Medication Sig Dispense Refill Last Dose/Taking    furosemide 20 MG Oral Tab Take 1 tablet (20 mg total) by mouth  daily. 90 tablet 3 10/19/2024 at  8:00 AM    Esomeprazole Magnesium 40 MG Oral Capsule Delayed Release Take 1 capsule (40 mg total) by mouth before breakfast. 90 capsule 0 10/19/2024 at  8:00 AM    carvedilol 12.5 MG Oral Tab Take 1 tablet (12.5 mg total) by mouth 2 (two) times daily. 180 tablet 3 10/19/2024 at  8:00 AM    metFORMIN 500 MG Oral Tab Take 0.5 tablets (250 mg total) by mouth daily with breakfast. 45 tablet 3 10/19/2024 at  8:00 AM    lisinopril 5 MG Oral Tab Take 1 tablet (5 mg total) by mouth daily. 90 tablet 3 10/19/2024 at  8:00 AM    DIGOXIN 0.125 MG Oral Tab TAKE 1 TABLET DAILY (Patient taking differently: Take 1 tablet (125 mcg total) by mouth As Directed. Mon/Wed/Fri  Hold for HR less than 60 and notify physician.) 90 tablet 3 10/19/2024 at  8:00 AM    atorvastatin 20 MG Oral Tab Take 1 tablet (20 mg total) by mouth nightly. 90 tablet 3 10/19/2024 at  8:00 PM    CLOPIDOGREL BISULFATE 75 MG Oral Tab TAKE 1 TABLET DAILY (Patient taking differently: Take 1 tablet (75 mg total) by mouth daily.) 90 tablet 3 10/19/2024 at  8:00 AM    ASPIRIN 81 MG OR TABS Take 1 tablet (81 mg total) by mouth at bedtime.   10/19/2024 at  8:00 PM    predniSONE 20 MG Oral Tab Take 2 tablets (40 mg total) by mouth daily with breakfast. 8 tablet 0 Unknown    albuterol 108 (90 Base) MCG/ACT Inhalation Aero Soln Inhale 2 puffs into the lungs every 4 (four) hours as needed for Wheezing. 1 each 0 More than a month    Glucose Blood (PRODIGY NO CODING BLOOD GLUC) In Vitro Strip Use as directed to test blood sugar once daily 100 strip 3     Prodigy Lancets 28G Does not apply Misc Test once daily 100 each 3     Lancet Device Does not apply Misc MEASURE HOME GLUCOSE DAILY --DIRECTED 1 each 11    [2]   Allergies  Allergen Reactions    Niaspan [Niacin, Antihyperlipidemic] ITCHING and FACE FLUSHING    Heparin UNKNOWN     States possible allergy to heparin. Blood sugar elevated when he was given heparin   [3]   Outpatient Medications  Marked as Taking for the 10/19/24 encounter (Hospital Encounter)   Medication Sig Dispense Refill    furosemide 20 MG Oral Tab Take 1 tablet (20 mg total) by mouth daily. 90 tablet 3    Esomeprazole Magnesium 40 MG Oral Capsule Delayed Release Take 1 capsule (40 mg total) by mouth before breakfast. 90 capsule 0    carvedilol 12.5 MG Oral Tab Take 1 tablet (12.5 mg total) by mouth 2 (two) times daily. 180 tablet 3    metFORMIN 500 MG Oral Tab Take 0.5 tablets (250 mg total) by mouth daily with breakfast. 45 tablet 3    lisinopril 5 MG Oral Tab Take 1 tablet (5 mg total) by mouth daily. 90 tablet 3    DIGOXIN 0.125 MG Oral Tab TAKE 1 TABLET DAILY (Patient taking differently: Take 1 tablet (125 mcg total) by mouth As Directed. Mon/Wed/Fri  Hold for HR less than 60 and notify physician.) 90 tablet 3    atorvastatin 20 MG Oral Tab Take 1 tablet (20 mg total) by mouth nightly. 90 tablet 3    CLOPIDOGREL BISULFATE 75 MG Oral Tab TAKE 1 TABLET DAILY (Patient taking differently: Take 1 tablet (75 mg total) by mouth daily.) 90 tablet 3    ASPIRIN 81 MG OR TABS Take 1 tablet (81 mg total) by mouth at bedtime.

## 2024-10-21 ENCOUNTER — APPOINTMENT (OUTPATIENT)
Dept: GENERAL RADIOLOGY | Facility: HOSPITAL | Age: 79
End: 2024-10-21
Attending: INTERNAL MEDICINE
Payer: MEDICARE

## 2024-10-21 ENCOUNTER — APPOINTMENT (OUTPATIENT)
Dept: CV DIAGNOSTICS | Facility: HOSPITAL | Age: 79
End: 2024-10-21
Attending: NURSE PRACTITIONER
Payer: MEDICARE

## 2024-10-21 LAB
ADENOVIRUS PCR:: NOT DETECTED
ALBUMIN SERPL-MCNC: 3.7 G/DL (ref 3.2–4.8)
ALBUMIN/GLOB SERPL: 1.6 {RATIO} (ref 1–2)
ALP LIVER SERPL-CCNC: 76 U/L
ALT SERPL-CCNC: 10 U/L
ANION GAP SERPL CALC-SCNC: 3 MMOL/L (ref 0–18)
ARTERIAL PATENCY WRIST A: POSITIVE
AST SERPL-CCNC: 10 U/L (ref ?–34)
B PARAPERT DNA SPEC QL NAA+PROBE: NOT DETECTED
B PERT DNA SPEC QL NAA+PROBE: NOT DETECTED
BASE EXCESS BLDA CALC-SCNC: -2.1 MMOL/L (ref ?–2)
BASOPHILS # BLD AUTO: 0.01 X10(3) UL (ref 0–0.2)
BASOPHILS NFR BLD AUTO: 0.1 %
BILIRUB SERPL-MCNC: 0.5 MG/DL (ref 0.2–1.1)
BODY TEMPERATURE: 98.6 F
BUN BLD-MCNC: 46 MG/DL (ref 9–23)
C PNEUM DNA SPEC QL NAA+PROBE: NOT DETECTED
CA-I BLD-SCNC: 1.21 MMOL/L (ref 0.95–1.32)
CALCIUM BLD-MCNC: 9.3 MG/DL (ref 8.7–10.4)
CHLORIDE SERPL-SCNC: 105 MMOL/L (ref 98–112)
CO2 SERPL-SCNC: 23 MMOL/L (ref 21–32)
COHGB MFR BLD: 0.7 % SAT (ref 0–3)
CORONAVIRUS 229E PCR:: NOT DETECTED
CORONAVIRUS HKU1 PCR:: NOT DETECTED
CORONAVIRUS NL63 PCR:: NOT DETECTED
CORONAVIRUS OC43 PCR:: NOT DETECTED
CREAT BLD-MCNC: 1.94 MG/DL
DIGOXIN SERPL-MCNC: 0.88 NG/ML (ref 0.8–2)
EGFRCR SERPLBLD CKD-EPI 2021: 34 ML/MIN/1.73M2 (ref 60–?)
EOSINOPHIL # BLD AUTO: 0 X10(3) UL (ref 0–0.7)
EOSINOPHIL NFR BLD AUTO: 0 %
ERYTHROCYTE [DISTWIDTH] IN BLOOD BY AUTOMATED COUNT: 13.4 %
EXPIRATORY PRESSURE: 6 CM H2O
FIO2: 30 %
FLUAV RNA SPEC QL NAA+PROBE: NOT DETECTED
FLUBV RNA SPEC QL NAA+PROBE: NOT DETECTED
GLOBULIN PLAS-MCNC: 2.3 G/DL (ref 2–3.5)
GLUCOSE BLD-MCNC: 189 MG/DL (ref 70–99)
GLUCOSE BLD-MCNC: 211 MG/DL (ref 70–99)
GLUCOSE BLD-MCNC: 233 MG/DL (ref 70–99)
GLUCOSE BLD-MCNC: 240 MG/DL (ref 70–99)
GLUCOSE BLD-MCNC: 245 MG/DL (ref 70–99)
HCO3 BLDA-SCNC: 23.3 MEQ/L (ref 21–27)
HCT VFR BLD AUTO: 34.8 %
HGB BLD-MCNC: 11.6 G/DL
HGB BLD-MCNC: 12.1 G/DL
IMM GRANULOCYTES # BLD AUTO: 0.07 X10(3) UL (ref 0–1)
IMM GRANULOCYTES NFR BLD: 0.6 %
INSP PRESSURE: 16 CM H2O
LACTATE BLD-SCNC: 1.1 MMOL/L (ref 0.5–2)
LYMPHOCYTES # BLD AUTO: 0.58 X10(3) UL (ref 1–4)
LYMPHOCYTES NFR BLD AUTO: 5.1 %
MAGNESIUM SERPL-MCNC: 1.8 MG/DL (ref 1.6–2.6)
MCH RBC QN AUTO: 29.3 PG (ref 26–34)
MCHC RBC AUTO-ENTMCNC: 33.3 G/DL (ref 31–37)
MCV RBC AUTO: 87.9 FL
METAPNEUMOVIRUS PCR:: NOT DETECTED
METHGB MFR BLD: 0 % SAT (ref 0.4–1.5)
MONOCYTES # BLD AUTO: 0.17 X10(3) UL (ref 0.1–1)
MONOCYTES NFR BLD AUTO: 1.5 %
MRSA DNA SPEC QL NAA+PROBE: POSITIVE
MYCOPLASMA PNEUMONIA PCR:: NOT DETECTED
NEUTROPHILS # BLD AUTO: 10.49 X10 (3) UL (ref 1.5–7.7)
NEUTROPHILS # BLD AUTO: 10.49 X10(3) UL (ref 1.5–7.7)
NEUTROPHILS NFR BLD AUTO: 92.7 %
OSMOLALITY SERPL CALC.SUM OF ELEC: 289 MOSM/KG (ref 275–295)
OXYHGB MFR BLDA: 97.2 % (ref 92–100)
PARAINFLUENZA 1 PCR:: NOT DETECTED
PARAINFLUENZA 2 PCR:: NOT DETECTED
PARAINFLUENZA 3 PCR:: NOT DETECTED
PARAINFLUENZA 4 PCR:: NOT DETECTED
PCO2 BLDA: 36 MM HG (ref 35–45)
PH BLDA: 7.4 [PH] (ref 7.35–7.45)
PLATELET # BLD AUTO: 109 10(3)UL (ref 150–450)
PO2 BLDA: 88 MM HG (ref 80–100)
POTASSIUM BLD-SCNC: 4.6 MMOL/L (ref 3.6–5.1)
POTASSIUM SERPL-SCNC: 4.7 MMOL/L (ref 3.5–5.1)
PROT SERPL-MCNC: 6 G/DL (ref 5.7–8.2)
RBC # BLD AUTO: 3.96 X10(6)UL
RHINOVIRUS/ENTERO PCR:: NOT DETECTED
RSV RNA SPEC QL NAA+PROBE: NOT DETECTED
SARS-COV-2 RNA NPH QL NAA+NON-PROBE: NOT DETECTED
SODIUM BLD-SCNC: 128 MMOL/L (ref 135–145)
SODIUM SERPL-SCNC: 131 MMOL/L (ref 136–145)
WBC # BLD AUTO: 11.3 X10(3) UL (ref 4–11)

## 2024-10-21 PROCEDURE — 94799 UNLISTED PULMONARY SVC/PX: CPT

## 2024-10-21 PROCEDURE — 93306 TTE W/DOPPLER COMPLETE: CPT | Performed by: NURSE PRACTITIONER

## 2024-10-21 PROCEDURE — 94640 AIRWAY INHALATION TREATMENT: CPT

## 2024-10-21 PROCEDURE — 36600 WITHDRAWAL OF ARTERIAL BLOOD: CPT | Performed by: INTERNAL MEDICINE

## 2024-10-21 PROCEDURE — 85025 COMPLETE CBC W/AUTO DIFF WBC: CPT | Performed by: STUDENT IN AN ORGANIZED HEALTH CARE EDUCATION/TRAINING PROGRAM

## 2024-10-21 PROCEDURE — 83050 HGB METHEMOGLOBIN QUAN: CPT | Performed by: INTERNAL MEDICINE

## 2024-10-21 PROCEDURE — 0202U NFCT DS 22 TRGT SARS-COV-2: CPT | Performed by: INTERNAL MEDICINE

## 2024-10-21 PROCEDURE — 84295 ASSAY OF SERUM SODIUM: CPT | Performed by: INTERNAL MEDICINE

## 2024-10-21 PROCEDURE — 82330 ASSAY OF CALCIUM: CPT | Performed by: INTERNAL MEDICINE

## 2024-10-21 PROCEDURE — 85018 HEMOGLOBIN: CPT | Performed by: INTERNAL MEDICINE

## 2024-10-21 PROCEDURE — 82962 GLUCOSE BLOOD TEST: CPT

## 2024-10-21 PROCEDURE — 82375 ASSAY CARBOXYHB QUANT: CPT | Performed by: INTERNAL MEDICINE

## 2024-10-21 PROCEDURE — 80162 ASSAY OF DIGOXIN TOTAL: CPT | Performed by: INTERNAL MEDICINE

## 2024-10-21 PROCEDURE — 71045 X-RAY EXAM CHEST 1 VIEW: CPT | Performed by: INTERNAL MEDICINE

## 2024-10-21 PROCEDURE — 82803 BLOOD GASES ANY COMBINATION: CPT | Performed by: INTERNAL MEDICINE

## 2024-10-21 PROCEDURE — 80053 COMPREHEN METABOLIC PANEL: CPT | Performed by: HOSPITALIST

## 2024-10-21 PROCEDURE — 83605 ASSAY OF LACTIC ACID: CPT | Performed by: INTERNAL MEDICINE

## 2024-10-21 PROCEDURE — 84132 ASSAY OF SERUM POTASSIUM: CPT | Performed by: INTERNAL MEDICINE

## 2024-10-21 PROCEDURE — 83735 ASSAY OF MAGNESIUM: CPT | Performed by: STUDENT IN AN ORGANIZED HEALTH CARE EDUCATION/TRAINING PROGRAM

## 2024-10-21 PROCEDURE — 94003 VENT MGMT INPAT SUBQ DAY: CPT

## 2024-10-21 RX ORDER — AMIODARONE HYDROCHLORIDE 200 MG/1
200 TABLET ORAL 3 TIMES DAILY
Status: DISCONTINUED | OUTPATIENT
Start: 2024-10-21 | End: 2024-10-22

## 2024-10-21 RX ORDER — IPRATROPIUM BROMIDE AND ALBUTEROL SULFATE 2.5; .5 MG/3ML; MG/3ML
3 SOLUTION RESPIRATORY (INHALATION)
Status: DISCONTINUED | OUTPATIENT
Start: 2024-10-21 | End: 2024-10-22

## 2024-10-21 RX ORDER — MAGNESIUM OXIDE 400 MG/1
400 TABLET ORAL ONCE
Status: COMPLETED | OUTPATIENT
Start: 2024-10-21 | End: 2024-10-21

## 2024-10-21 NOTE — PROGRESS NOTES
10/20/24 2227   Oxygen Utilization   O2 Device Bi-PAP   IPAP 16   EPAP 6   FiO2 (%) 30 %     Patient placed back on BiPAP with above settings.     Patient given DuoNeb, Pulmicort and Brovana as ordered. Breath sounds diminished before and after treatments. Patient has strong cough.     Patient has been taking breaks on and off BiPAP onto Nasal cannula throughout the day. Discussed with patient that if they feel short of breath they should wear BiPAP. Also discussed wearing BiPAP for part of the night as needed. Patient agreed to wear BiPAP as needed.     Will continue to assess and follow RT protocol.    Jasmin Woodard, Bell, RRT

## 2024-10-21 NOTE — PROGRESS NOTES
Assumed care of patient at 1930.A/Ox4, follows commands. Pt has Bi-PAP PRN, otherwise O2 NC. AV paced, amiodarone gtt infusing as ordered. Voids in urinal at bedside. Updated on POC.    0605 - pt became short of breath sitting to urinate. RT put BiPAP back on. Pt reports feeling slight relief.    POC: manage K+ if elevated

## 2024-10-21 NOTE — PROGRESS NOTES
[facility]  Hospitalist Progress Note                                                                     [facility]   part of [facility]        [facility]  6/5/1945    SUBJECTIVE:  Patient seen and examined.  Off Bipap, on 5L O2.  Family bedside.  Discussed amio infusion and COPD X.  Denies CP.  Still with dyspnea.  NAD.       OBJECTIVE:  Temp:  [96 °F (35.6 °C)-98.1 °F (36.7 °C)] 97.5 °F (36.4 °C)  Pulse:  [60-81] 63  Resp:  [13-29] 23  BP: (108-156)/(44-85) 111/44  SpO2:  [92 %-100 %] 97 %  FiO2 (%):  [30 %] 30 %  Exam  Gen: No acute distress, alert and oriented x3, no focal neurologic deficits  Pulm: minimal scattered end expiratory wheezing, diminished air movement b/l , normal respiratory effort  CV: Heart with regular rate and rhythm, no murmur.  Normal PMI.    Abd: Abdomen soft, nontender, nondistended, no organomegaly, bowel sounds present  MSK: Full range of motion in extremities, no clubbing, no cyanosis  Skin: no rashes or lesions    Labs:   Recent Labs   Lab 10/19/24  2243 10/21/24  0424   WBC 7.5 11.3*   HGB 12.8* 11.6*   MCV 87.3 87.9   .0* 109.0*   INR 1.01  --        Recent Labs   Lab 10/19/24  2243 10/20/24  0416 10/20/24  0851 10/20/24  1145 10/20/24  1456 10/21/24  0424    134* 135* 133*  --  131*   K 4.9 5.7* 5.8* 6.1* 4.5 4.7    107 106 107  --  105   CO2 27.0 25.0 24.0 22.0  --  23.0   BUN 48* 50* 48* 41*  --  46*   CREATSERUM 2.16* 1.97* 2.06* 2.16*  --  1.94*   CA 10.2 9.8 9.7 9.6  --  9.3   MG  --   --  2.0  --   --  1.8   PHOS  --   --  4.3 4.1  --   --    * 178* 237* 242*  --  189*       Recent Labs   Lab 10/19/24  2243 10/20/24  1145 10/21/24  0424   ALT 14  --  10   AST 15  --  10   ALB 4.6 4.3 3.7       Recent Labs   Lab 10/20/24  1429 10/20/24  1748 10/20/24  2101 10/21/24  0614 10/21/24  1142   PGLU 157* 137* 230* 245* 233*       Meds:   Scheduled:    ipratropium-albuterol  3 mL  Nebulization 6 times per day    heparin  5,000 Units Subcutaneous Q8H JENNIFFER    aspirin  81 mg Oral Nightly    atorvastatin  20 mg Oral Nightly    carvedilol  12.5 mg Oral BID    clopidogrel  75 mg Oral Daily    pantoprazole  40 mg Oral QAM AC    insulin aspart  2-10 Units Subcutaneous TID AC and HS    digoxin  125 mcg Oral Q MWF    methylPREDNISolone  80 mg Intravenous Q6H    budesonide  0.5 mg Nebulization 2 times daily    arformoterol  15 mcg Nebulization 2 times daily     Continuous Infusions:    amiodarone 1 mg/min (10/21/24 0949)     PRN:   acetaminophen **OR** HYDROcodone-acetaminophen **OR** HYDROcodone-acetaminophen    ondansetron    metoclopramide    albuterol    fentaNYL    Assessment/Plan:  Principal Problem:    Ventricular tachyarrhythmia (HCC)  Active Problems:    V-tach (HCC)    Ischemic cardiomyopathy with implantable cardioverter-defibrillator (ICD)    79 year old male with PMH sig for ischemic cardiomyopathy with an EF of 25%, history of VT status post AICD, chronic systolic heart failure, AAA status post EVAR, diabetes mellitus type 2, dyslipidemia, left renal artery stenosis presented after his ICD fired 4 times.      Ventricular tachycardia with appropriate shocks  S/p DCICD  - cont amio infusion, wean per cardiology  - coreg  - order echo  - further management per cardiology      Acute Hypercapnic Respiratory failure from acute COPD exacerbation  - pulmonology following  - wean Bipap as toelrated  - IV steroids, taper per pulm  - budesonide/brovana     ICMP EF25%  Chronic systolic heart failure  - cardiology following  - further diuresis per cardiology/nephrology   - daily weights, I/Os  - coreg  - maximize GDMT as bp permits     MAIA on CKD with hyperkalemia  - s/p lokelma x3 doses per nephrology   - hold lisinopril   - I/Os, UOP  - nephrology following      DM2  - ISS/accucheks     Hyperlipidemia  - statin     AAA s/p EVAR  - stable     FEN: regular diet, PT/OT  Proph: SCDs, heparin  Code status:  Full code       Namita Sanford MD  [facility]  Message over [email or web address]  Pager: [phone]

## 2024-10-21 NOTE — PROGRESS NOTES
[facility]    Mingo White Patient Status:  Inpatient    1945 MRN [id number]   Location [facility] 6NE-A Attending Rehana Sanford*   Hosp Day # 1 PCP Daren Espino MD     Critical Care Progress Note     Date of Admission: 10/19/2024 10:37 PM  Admission Diagnosis: Ventricular tachyarrhythmia (HCC) [I47.20]  Ischemic cardiomyopathy with implantable cardioverter-defibrillator (ICD) [I25.5, Z95.810]     S:  Pt with increased SOB this am requiring re-initiation of bipap.  He also notes sore throat.         Current Medications:    Current Facility-Administered Medications:     heparin (Porcine) 5000 UNIT/ML injection 5,000 Units, 5,000 Units, Subcutaneous, Q8H JENNIFFER    acetaminophen (Tylenol) tab 650 mg, 650 mg, Oral, Q4H PRN **OR** HYDROcodone-acetaminophen (Norco) 5-325 MG per tab 1 tablet, 1 tablet, Oral, Q4H PRN **OR** HYDROcodone-acetaminophen (Norco) 5-325 MG per tab 2 tablet, 2 tablet, Oral, Q4H PRN    ondansetron (Zofran) 4 MG/2ML injection 4 mg, 4 mg, Intravenous, Q6H PRN    metoclopramide (Reglan) 5 mg/mL injection 5 mg, 5 mg, Intravenous, Q8H PRN    albuterol (Ventolin HFA) 108 (90 Base) MCG/ACT inhaler 2 puff, 2 puff, Inhalation, Q4H PRN    aspirin DR tab 81 mg, 81 mg, Oral, Nightly    atorvastatin (Lipitor) tab 20 mg, 20 mg, Oral, Nightly    carvedilol (Coreg) tab 12.5 mg, 12.5 mg, Oral, BID    clopidogrel (Plavix) tab 75 mg, 75 mg, Oral, Daily    pantoprazole (Protonix) DR tab 40 mg, 40 mg, Oral, QAM AC    insulin aspart (NovoLOG) 100 Units/mL FlexPen 2-10 Units, 2-10 Units, Subcutaneous, TID AC and HS    digoxin (Lanoxin) tab 125 mcg, 125 mcg, Oral, Q MWF    ipratropium-albuterol (Duoneb) 0.5-2.5 (3) MG/3ML inhalation solution 3 mL, 3 mL, Nebulization, QID    methylPREDNISolone sodium succinate (Solu-MEDROL) injection 80 mg, 80 mg, Intravenous, Q6H    budesonide (Pulmicort) 0.5 MG/2ML nebulizer suspension 0.5 mg, 0.5 mg, Nebulization, 2 times daily    arformoterol (Brovana) 15  MCG/2ML nebulizer solution 15 mcg, 15 mcg, Nebulization, 2 times daily    fentaNYL (Sublimaze) 50 mcg/mL injection 25 mcg, 25 mcg, Intravenous, Q5 Min PRN    amiodarone (Cordarone) 450 mg in dextrose 5% 250 mL infusion, 1 mg/min, Intravenous, Continuous     OBJECTIVE:  /85   Pulse 71   Temp 97.6 °F (36.4 °C) (Temporal)   Resp 25   Wt 106 lb 11.2 oz (48.4 kg)   SpO2 93%   BMI 17.22 kg/m²      Ventilator Settings: bipap 30/%      Wt Readings from Last 3 Encounters:   10/20/24 106 lb 11.2 oz (48.4 kg)   11/15/23 113 lb 1.6 oz (51.3 kg)   03/21/22 124 lb (56.2 kg)        I/O last 3 completed shifts:  In: 1849.2 [P.O.:960; I.V.:889.2]  Out: 850 [Urine:850]  No intake/output data recorded.      Physical Exam:                          General: alert, cooperative, in NAD                          HEENT: oropharynx clear without erythema or exudates, moist mucous membranes                          Lungs: diminished bs bilaterally, diffuse expiratory wheeze                          Chest wall: No tenderness or deformity.                          Heart: Regular rate and rhythm, normal S1S2                          Abdomen: soft, non-tender, non-distended, positive BS.                          Extremity: No clubbing or cyanosis. no edema                          Skin: No rashes or lesions.       Lab Results   Component Value Date    WBC 11.3 10/21/2024    RBC 3.96 10/21/2024    HGB 11.6 10/21/2024    HCT 34.8 10/21/2024    MCV 87.9 10/21/2024    MCH 29.3 10/21/2024    MCHC 33.3 10/21/2024    RDW 13.4 10/21/2024    .0 10/21/2024     Lab Results   Component Value Date     10/21/2024    K 4.7 10/21/2024     10/21/2024    CO2 23.0 10/21/2024    BUN 46 10/21/2024    CREATSERUM 1.94 10/21/2024     10/21/2024    CA 9.3 10/21/2024    ALKPHO 76 10/21/2024    ALT 10 10/21/2024    AST 10 10/21/2024    BILT 0.5 10/21/2024    ALB 3.7 10/21/2024    TP 6.0 10/21/2024     Lab Results   Component Value Date     INR 1.01 10/19/2024    INR 1.0 (L) 08/08/2008    INR 1.0 (L) 12/11/2007           Imaging: I have independently visualized all relevant chest imaging in PACS.  I agree with the radiology interpretation except where noted.       ASSESSMENT/PLAN:  Acute Hypercapnic resp failure: 2/2 AECOPD  -on bipap, wean as able  -check RVP  -repeat CXR   -repeat ABG   COPD Exacerbation  -IV steroids  -budesonide/brovana  -BD protocol  CHF w/ mult AICD firings  -echo with EF: 15-20% severe diffuse hypokinesis with regional variations, G1DD  -amiodarone  -Cards following  -monitor electrolytes  MAIA on CKD  -renal following  FEN:  -ADAT   Proph:  -hep sub Q  Dispo:  -ICU     Critical Care Time: 35 minutes    Tierney Shepard MD  10/21/2024  8:12 AM

## 2024-10-21 NOTE — PROGRESS NOTES
[facility]   [facility]    Nephrology Progress Note    Mingo White Attending:  Rehana Sanford*     Cc: christine, hyperkalemia    SUBJECTIVE     Sp IVFs  No complaints     PHYSICAL EXAM   Vital signs: /74 (BP Location: Right arm)   Pulse 76   Temp 97.6 °F (36.4 °C) (Temporal)   Resp 26   Wt 106 lb 11.2 oz (48.4 kg)   SpO2 92%   BMI 17.22 kg/m²   Temp (24hrs), Av.5 °F (36.4 °C), Min:97 °F (36.1 °C), Max:98.1 °F (36.7 °C)       Intake/Output Summary (Last 24 hours) at 10/21/2024 1403  Last data filed at 10/21/2024 1200  Gross per 24 hour   Intake 1765.93 ml   Output 775 ml   Net 990.93 ml     Wt Readings from Last 3 Encounters:   10/20/24 106 lb 11.2 oz (48.4 kg)   11/15/23 113 lb 1.6 oz (51.3 kg)   22 124 lb (56.2 kg)     General: NAD  HEENT: NCAT, EOMI, MMM  Neck: Supple   Cardiac: Regular rate and rhythm   Lungs: CTAB  Abdomen: Soft, non-tender, nondistended   Extremities: No edema  Neurologic: No asterxis  Skin: Warm and dry, no rashes     MEDS     Current Facility-Administered Medications   Medication Dose Route Frequency    ipratropium-albuterol (Duoneb) 0.5-2.5 (3) MG/3ML inhalation solution 3 mL  3 mL Nebulization 6 times per day    heparin (Porcine) 5000 UNIT/ML injection 5,000 Units  5,000 Units Subcutaneous Q8H [facility]    acetaminophen (Tylenol) tab 650 mg  650 mg Oral Q4H PRN    Or    HYDROcodone-acetaminophen (Norco) 5-325 MG per tab 1 tablet  1 tablet Oral Q4H PRN    Or    HYDROcodone-acetaminophen (Norco) 5-325 MG per tab 2 tablet  2 tablet Oral Q4H PRN    ondansetron (Zofran) 4 MG/2ML injection 4 mg  4 mg Intravenous Q6H PRN    metoclopramide (Reglan) 5 mg/mL injection 5 mg  5 mg Intravenous Q8H PRN    albuterol (Ventolin HFA) 108 (90 Base) MCG/ACT inhaler 2 puff  2 puff Inhalation Q4H PRN    aspirin DR tab 81 mg  81 mg Oral Nightly    atorvastatin (Lipitor) tab 20 mg  20 mg Oral Nightly    carvedilol (Coreg) tab 12.5 mg  12.5 mg Oral BID    clopidogrel  (Plavix) tab 75 mg  75 mg Oral Daily    pantoprazole (Protonix) DR tab 40 mg  40 mg Oral QAM AC    insulin aspart (NovoLOG) 100 Units/mL FlexPen 2-10 Units  2-10 Units Subcutaneous TID AC and HS    digoxin (Lanoxin) tab 125 mcg  125 mcg Oral Q MWF    methylPREDNISolone sodium succinate (Solu-MEDROL) injection 80 mg  80 mg Intravenous Q6H    budesonide (Pulmicort) 0.5 MG/2ML nebulizer suspension 0.5 mg  0.5 mg Nebulization 2 times daily    arformoterol (Brovana) 15 MCG/2ML nebulizer solution 15 mcg  15 mcg Nebulization 2 times daily    fentaNYL (Sublimaze) 50 mcg/mL injection 25 mcg  25 mcg Intravenous Q5 Min PRN    amiodarone (Cordarone) 450 mg in dextrose 5% 250 mL infusion  1 mg/min Intravenous Continuous       LABS     Lab Results   Component Value Date    WBC 11.3 10/21/2024    HGB 11.6 10/21/2024    HCT 34.8 10/21/2024    .0 10/21/2024    CREATSERUM 1.94 10/21/2024    BUN 46 10/21/2024     10/21/2024    K 4.7 10/21/2024     10/21/2024    CO2 23.0 10/21/2024     10/21/2024    CA 9.3 10/21/2024    ALB 3.7 10/21/2024    ALKPHO 76 10/21/2024    BILT 0.5 10/21/2024    TP 6.0 10/21/2024    AST 10 10/21/2024    ALT 10 10/21/2024    MG 1.8 10/21/2024    PGLU 233 10/21/2024       IMAGING   All imaging studies personally reviewed.    XR CHEST AP PORTABLE  (CPT=71045)   Final Result   PROCEDURE:  XR CHEST AP PORTABLE  (CPT=71045)       TECHNIQUE:  AP chest radiograph was obtained.       COMPARISON:  [facility] , XR, XR CHEST AP PORTABLE  (CPT=71045), 10/20/2024,    9:46 AM.       INDICATIONS:  hypoxic       PATIENT STATED HISTORY: (As transcribed by Technologist)  Patient offered    no additional history at this time.             FINDINGS:  Support devices appear overall stable.  Postsurgical changes of    chest are noted.  Heart size is within normal limits.  There is a question    of slight increased prominence of the pulmonary vasculature.  This may    reflect mild vascular congestion    and  volume overload.  Mild basilar atelectasis is favored.  No new focal    consolidation or pleural effusion is seen.                         =====   CONCLUSION:  See above.           LOCATION:  [facility]                       Dictated by (CST): Andrea Lewis MD on 10/21/2024 at 8:49 AM        Finalized by (CST): Andrea Lewis MD on 10/21/2024 at 8:50 AM         XR CHEST AP PORTABLE  (CPT=71045)   Final Result   PROCEDURE:  XR CHEST AP PORTABLE  (CPT=71045)       TECHNIQUE:  AP chest radiograph was obtained.       COMPARISON:  [facility] , XR, XR CHEST AP PORTABLE  (CPT=71045), 10/19/2024,    11:07 PM.       INDICATIONS:  dyspnea       PATIENT STATED HISTORY: (As transcribed by Technologist)  Patient offered    no additional history at this time.             FINDINGS:  Stable cardiomegaly, changes of prior CABG and stable dual lead    cardiac pacemaker.  Normal pulmonary vasculature.  Mild atelectasis in the    left lung base noted.  Stable hyperaeration of the lungs.                         =====   CONCLUSION:     1. Mild subsegmental atelectasis within the left lung base noted.   2. Stable hyperaeration of the lungs suspicious for emphysema/COPD.   3. Stable cardiomegaly, stable changes of prior CABG and cardiac    pacemaker.           LOCATION:  [facility]                       Dictated by (CST): Dasha Rees DO on 10/20/2024 at 10:09 AM        Finalized by (CST): Dasha Rees DO on 10/20/2024 at 10:09 AM         XR CHEST AP PORTABLE  (CPT=71045)   Final Result   PROCEDURE:  XR CHEST AP PORTABLE  (CPT=71045)       TECHNIQUE:  AP chest radiograph was obtained.       COMPARISON:  [facility] , XR, XR CHEST AP PORTABLE  (CPT=71045), 11/14/2023,    0:18 AM.  [facility], XR, CHEST PA   LATERAL, 9/19/2008, 8:52 AM.       INDICATIONS:  ICD fired mulitple times       PATIENT STATED HISTORY: (As transcribed by Technologist)  Patient arrives    from home due to ICD going off multiple times. Patient offered no    additional history at this times.              FINDINGS:  Stable left-sided AICD. Cardiac size and pulmonary vasculature    are within normal limits. No pleural effusions. No pneumothorax.  Median    sternotomy approximated by wire sutures.                             =====   CONCLUSION:  No acute pulmonary findings.           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      Hyperkalemia  -- sp lokelma, continue to redose x3 doses total   -- CK wnl  -- Hold lasix. sp gentle IVFs to promote kaliuresis. Now off. Initial CXR w no evidence of fluid overload. Repeat pending   -- Glucose control   -- albuterol per pulm   -- low K diet      MAIA on CKD3  -- suspect hypovolemic from increased home diuretics vs CRS  -- initially appeared on the dry side on exam to me. Lips dry. No edema. CXR without edema. No crackles on exam. +mild wheezing. Pulm suspects resp distress due to COPD exac and treating w meds/steroids/bipap.   -- gave 1L IVFs. Cr and K improved. Holding lasix. Restart PRN  -- ok to restart gentle diuresis prn  -- recommend holding lisinopril    -- ECHO w EF 15-20% severe diffuse hypokinesis w regional variations, G1DD  -- UA w no RBCs, 30 protein. Urine lytes noted after lasix  -- check renal US w duplex  -- Strict UOP   -- avoid nephrotoxins and renally dose meds   -- may need angiogram this admit pending recovery per notes     Anemia/thrombocytopenia  -- %sat 19, give IV iron if no infection   Iron def could be contributing to his dyspnea     D/w Dr Sanford  D/w pt family     Thank you for allowing me to participate in the care of this patient. Please do not hesitate to call with questions or concerns.       Shantell Burger MD  [facility]  Nephrology

## 2024-10-21 NOTE — PAYOR COMM NOTE
--------------  ADMISSION REVIEW   10/19-10/21  Payor: [employer]  Subscriber #:  [id number]  Authorization Number: [id number]    Admit date: 10/20/24  Admit time:  1:31 AM       REVIEW DOCUMENTATION:  ED Provider Notes signed by Emily Mata MD at 10/20/2024 12:45 AM    Patient Seen in: [facility] Emergency Department  History     Chief Complaint   Patient presents with    Arrythmia/Palpitations     Stated Complaint: ICD fired mulitple times    Subjective:   HPI    Patient is an 79-year-old gentleman with previous history of AICD placement due to history of cardiac arrhythmia secondary to ischemic cardiomyopathy.  Review of records reveals patient's most recent echocardiogram revealed ejection fraction of 15 to 20%.  Recently patient had seen his cardiologist about 8 days ago and at that time had an adjustment of his AICD with decrease of his treatment threshold by 36 bpm to 162.  There was also discussion of the patient possibly needing a different device.  He states that he was feeling fine this evening and all of a sudden had an episode of lightheadedness, he sat down and felt a shock from his defibrillator.  This occurred 4 more times send the patient is now brought in by paramedics.  He denies any chest pain, difficulty breathing, lower extremity swelling, or other complaints.    This patient was being placed on our cardiac monitor another shock was delivered while the patient continued to be conversant without interruption in conversation.  Patient states that he did not feel lightheaded or any other symptoms when the device fired.    Additional information obtained from paramedic.    Objective:   Past Medical History:    Anesthesia complication    was \"out of it\" for a long time after sedation with previous colonoscopies    Back problem    Carotid artery disease (HCC)    Congestive heart disease (HCC)    CORONARY ARTERY DISEASE    Coronary atherosclerosis    DIABETES    Diabetes (HCC)     Gastric ulcer    HEART ATTACK    age 61    High blood pressure    HYPERLIPIDEMIA    Sinus drainage    with weather changes     Positive for stated complaint: ICD fired mulitple times  Other systems are as noted in HPI.  Constitutional and vital signs reviewed.      All other systems reviewed and negative except as noted above.    Physical Exam     ED Triage Vitals   BP 10/19/24 2242 129/62   Pulse 10/19/24 2239 84   Resp 10/19/24 2239 16   Temp 10/19/24 2239 97.8 °F (36.6 °C)   Temp src 10/19/24 2239 Temporal   SpO2 10/19/24 2239 100 %   O2 Device 10/19/24 2239 None (Room air)       Current:/51   Pulse 70   Temp 97.8 °F (36.6 °C) (Temporal)   Resp 21   Wt 51 kg   SpO2 95%   BMI 18.15 kg/m²     Constitutional: No apparent distress  Eyes: No scleral icterus  Heart: regular rate rhythm, no murmurs  Lungs: Clear to auscultation bilaterally  Abdomen: Soft and nontender  Extremities no edema  Skin: No rash  Neuro: Alert and oriented ×3  ED Course/ My interpretations:     Labs Reviewed   COMP METABOLIC PANEL (14) - Abnormal; Notable for the following components:       Result Value    Glucose 147 (*)     BUN 48 (*)     Creatinine 2.16 (*)     Calculated Osmolality 299 (*)     eGFR-Cr 30 (*)     All other components within normal limits   CBC WITH DIFFERENTIAL WITH PLATELET - Abnormal; Notable for the following components:    HGB 12.8 (*)     HCT 37.2 (*)     .0 (*)     All other components within normal limits   PTT, ACTIVATED - Abnormal; Notable for the following components:    PTT 48.9 (*)     All other components within normal limits   PROTHROMBIN TIME (PT) - Normal   DIGOXIN (LANOXIN) - Normal   TROPONIN I HIGH SENSITIVITY - Normal   RAINBOW DRAW LAVENDER   RAINBOW DRAW LIGHT GREEN   RAINBOW DRAW GOLD   RAINBOW DRAW BLUE   RAPID SARS-COV-2 BY PCR   EKG    Rate, intervals and axes as noted on EKG Report.  Rate: 78  Rhythm: Sinus Rhythm  Reading: Sinus rhythm with occasional PVCs, nonspecific  intraventricular block, no significant change from prior EKG performed on November 14, 2023.    Telemetry review revealed run of V. tach successfully treated by patient AICD, which noted by staff by staff.  Patient's device was interrogated.  [employer] contacted me over the phone with the report of 11 episodes noted tonight several of them treated successfully by the AICD.  Somewhere somewhat ongoing with the heart rate remaining below 162 bpm zone.  My independent imaging interpretation:       XR CHEST AP PORTABLE  (CPT=71045)      No pneumonia, no pulmonary edema.  All available radiology reports for this visit reviewed.  AICD is in stable position per radiologist.    Medications given:  Orders Placed This Encounter    Comp Metabolic Panel (14)    CBC With Differential With Platelet    Prothrombin Time (PT)    PTT, Activated    Digoxin (Lanoxin)    Troponin I (High Sensitivity)    fentaNYL (Sublimaze) 50 mcg/mL injection 25 mcg    amiodarone (Cordarone) 150 MG/3ML injection 150 mg    amiodarone (Cordarone) 450 mg in dextrose 5% 250 mL infusion    amiodarone (Cordarone) 150 MG/3ML injection 150 mg      MDM      Extensive differential diagnosis was considered for the patient including ventricular fibrillation, ventricular tachycardia, SVT, AICD malfunction, and other pathology.    Patient's pacer witnessed to deliver appropriate shock as patient showing V. tach on monitor.  Interrogation also supports proper function of the device.    Patient given amiodarone.  Subsequently placed on drip and consultation with cardiology, Dr. Doss.    Admitted to intensive care unit for further care.    Hospitalist also consulted.    Admission disposition: 10/19/2024 11:58 PM    Disposition and Plan     Clinical Impression:  1. Ventricular tachyarrhythmia (HCC)    2. Ischemic cardiomyopathy with implantable cardioverter-defibrillator (ICD)         Disposition:  Admit  10/19/2024 11:58 pm    Hospital Problems       Present on  Admission  Date Reviewed: 3/21/2022            ICD-10-CM Noted POA    V-tach (HCC) I47.20 10/19/2024 Unknown           Approximately 55 minutes of critical care time (exclusive of billable procedures) was administered to manage the patient's unstable vital signs due to her hypotension. This involved direct patient intervention, complex decision making, and/or extensive discussions with the patient, family, and clinical staff.  Hospital Problems       Present on Admission  Date Reviewed: 3/21/2022            ICD-10-CM Noted POA    V-tach (HCC) I47.20 10/19/2024 Unknown                 10/20 H&P        Chief Complaint   Patient presents with    Arrythmia/Palpitations     History of Present Illness: Patient is a 79 year old male with PMH sig for ischemic cardiomyopathy with an EF of 25%, history of VT status post AICD, chronic systolic heart failure, AAA status post EVAR, diabetes mellitus type 2, dyslipidemia, left renal artery stenosis presented after his ICD fired 4 times.   Yesterday he was feeling fine when he started feeling lightheaded, he sat down and felt a shock from his defibrillator.  This repeated 4 more times and he is subsequently brought in by EMS. Recently saw his cardiologist 8 days ago and had his AICD settings adjusted with a decrease in treatment threshold.  Also discussions about upgrading his device.   In the ED vitals are stable.  Labs significant for creatinine of 2.16, negative troponin and x-ray with no acute pulmonary findings.  He had another shock in the ER when the monitor showed V. tach.  Started on amiodarone infusion, cardiology consulted and admitted to cardiac ICU.      79 year old male with PMH sig for ischemic cardiomyopathy with an EF of 25%, history of VT status post AICD, chronic systolic heart failure, AAA status post EVAR, diabetes mellitus type 2, dyslipidemia, left renal artery stenosis presented after his ICD fired 4 times.      Ventricular tachycardia with appropriate  shocks  S/p DCICD  - cont amio infusion, wean per cardiology  - coreg  - order echo     Acute Hypercapnic Respiratory failure from acute COPD exacerbation  - consult pulmonology  - start bipap   - IV steroids, duonebs q6h  - obtain CXR  - hold further diuresis for now      ICMP EF25%  Chronic systolic heart failure  - given IV lasix this AM, however looks dry, hold further diuretics for now   - daily weights, I/Os  - coreg  - maximize GDMT as bp permits     MAIA on CKD with hyperkalemia  - lokelma x3 doses per nephrology   - hold further diuretics, may need fluids  - hold lisinopril   - check UA  - I/Os, UOP  - nephrology following      DM2  - ISS/accucheks     Hyperlipidemia  - statin     AAA s/p EVAR  - stable     FEN: regular diet, PT/OT  Proph: SCDs, heparin  Code status: Full code      Outpatient records or previous hospital records reviewed.   [facility] hospitalist to continue to follow patient while in house          10/20 Cardiology    Mingo White is a 79 year old male with PMhx as below referred for VT.  Long standing CM, recently saw Dr Pedroza with slow VT with plan for LV lead upgrade.  Cutoff lowered to allow for VT to be treated and yesterday had shocks x 4.     Also seeing Dr Perera for CHF  (no records available) and he has been having increasing WALTERS with self dosing of diuretics.  Per his report lasix 40qam and 20qpm prn.     No device interrogation available, per report it showed \"normal function\".  He was started on an amio gtt in the ED.  Claims compliance with home meds.     The patient otherwise denies chest pain, shortness of breath, palpitations, lightheadedness, syncope, orthopnea, paroxysmal nocturnal dyspnea, or edema.      Impression:  ICM s/p DCICD 2007,   VT - per report approrriate shocks, now on amio gtt  CKD Cr 2's  Hyperkalemia on intake labs        Plan:  ICM - appears sob at baseline, has orthopnea.  Creatinine elevated.  At home taking lasix.  Change to IV today for marked WALTERS.   Taking coreg, lis per report, not on further GDMT at this time for unclear reasons.  Change lasix to IV, watch on tele.  Consider further GDMT based on course and Cr limitations.  VT - obtain formal interrogation.  Cont amio gtt, repeat K, cont coreg ACE  CKD - consult renal.  Cardiorenal picture  Hyperkalemia - repeat K.          10/20 Pulm    History of Present Illness: 78 y/o w/ h/o COPD, CHF, CAD, DM presented to EDW w/ c/o SOB and his defibrillator firing.  Pt is not able to say how long he has had the SOB for.  In the ED he received another shock.  He had a chest x-ray that showed no evidence of fluid overload.  His labs showed CKD, mild anemia and chronic TCP.  This AM his K was noted to be higher and he continued to have SOB and now wheezing.   An ABG was checked and the pt was noted to have a mixed, though primarily respiratory, acidosis.        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      Follows w Dr Rogers.   He has been having increased WALTERS and self dosing of diuretics. Per his report lasix 40qam and 20qpm. No n/v/d.      Drinks 8 cups of coffee per day, but it is diluted.   No n/v/d. No nsaids.     Mingo White is a a(n) 79 year old male former smoker w ho COPD, CHF, DM, CAD, CKD3-4 (bl Cr 1.9-2.2) who presented to ED w SOB and defirillator firing.      Hyperkalemia  -- sp lokelma, continue to redose x3 doses  total today  -- Check CK and dig level  -- Hold lasix. Recommend gentle IVFs to promote kaliuresis, can ishan w lasix if needed prn. CXR w no evidence of fluid overload.   -- Glucose control   -- albuterol per pulm   -- low K diet      MAIA on CKD3  -- suspect hypovolemic from increased home diuretics vs CRS  -- appears dry on exam to me. Lips dry. No edema. CXR without edema. No crackles on exam. +mild wheezing. Pulm suspects resp distress due to COPD exac and treating w meds/steroids/bipap   -- recommend holding lisinopril and lasix   -- if ok w cards, give gentle IVF x 1L then stop   -- check UA, urine lytes, renal US w duplex, echo  -- Strict UOP   -- avoid nephrotoxins and renally dose meds      Anemia/thrombocytopenia  -- check iron stores, if deficient could be contributing to his dyspnea     Critical care time > 35min            10/20 Cardiology    Reason for Consultation:  CHF           History of Present Illness:  Mingo White is a a(n) 79 year old male with history of a cardiomyopathy with ejection fraction approximately 15%, coronary artery disease, ICD placement, renal insufficiency, COPD who presented with defibrillator discharge.  Patient has been short of breath and has been self dosing steroids.  On arrival here, patient's potassium was 6.1.  He was also hypercapnic and acidotic.  He was admitted to the CCU.  Currently he is on BiPAP and feeling better.     Patient denies any ischemic chest pain recently. He denies any recent fevers or cough.     Patient is followed by Dr. Joselito Rogers in our group for his congestive heart failure and duly cardiology for his electrophysiology needs.      CV -patient is ischemic cardiomyopathy.  I reviewed his echo.  Ejection fraction is no different.  Clinically, he does not appear to be significantly volume overloaded.  He may be a bit on the dry side.  He is getting some slow hydration by renal and seems to be tolerating it.  He had defibrillator shocks  presumably for VT.  He has been seen by Dr. Ureña and is currently on IV amiodarone drip.  We will initiate his usual medication regimen and reassess tomorrow morning.  I will send a dig level today.  Renal insufficiency -baseline creatinine close to 2.  He apparently does better with creatinine in this range.  ACE inhibitor's are being held due to his high potassium.  History of COPD -felt to have an acute exacerbation.  Is currently on BiPAP and oxygenating well and appears quite comfortable.  He has been seen by Dr. Clemons             10/21 Cardiology    Subjective:  On Bipap, breathing comfortably.  Tele negative, brief NSVT.BP stable.    Lungs: diminished       Impression:  VT/AICD - multiple shocks PTA, one in the ER.  Dr. Ureña following, appreciate.  Device reprogrammed.  On IV amiodarone.  Acute hypercapneic/hypoxic resp failure - long time smoker with COPD, quit 11/23.  On Bipap, nebs, and steroids per pulmonary.  CAD with ischemic cardiomyopathy, EF near 20%.  CABG 2006 in [place].  Endovascular AAA repair 2008.  Known left GRIFFIN  Acute on CRI - stable.  H/o hyperkalemia limiting aggressive GDMT.              Plan:  Will touch base with [facility] regarding their plans.  Keep volume neutral at this point.  Continue beta blocker, ASA, plavix.  Pending recovery, may proceed with angiogram.  See if we can get his CABG report.              10/21 Critical Care    S:  Pt with increased SOB this am requiring re-initiation of bipap.  He also notes sore throat.                                 Lungs: diminished bs bilaterally, diffuse expiratory wheeze       Acute Hypercapnic resp failure: 2/2 AECOPD  -on bipap, wean as able  -check RVP  -repeat CXR   -repeat ABG   COPD Exacerbation  -IV steroids  -budesonide/brovana  -BD protocol  CHF w/ mult AICD firings  -echo with EF: 15-20% severe diffuse hypokinesis with regional variations, G1DD  -amiodarone  -Cards following  -monitor electrolytes  MAIA on  CKD  -renal following  FEN:  -ADAT   Proph:  -hep sub Q  Dispo:  -ICU        Lab 10/19/24  2243 10/21/24  0424   WBC 7.5 11.3*   HGB 12.8* 11.6*   .0* 109.0*         Chem:           Recent Labs   Lab 10/19/24  2243 10/20/24  0416 10/20/24  0851 10/20/24  1145 10/20/24  1456 10/21/24  0424    134* 135* 133*  --  131*   K 4.9 5.7* 5.8* 6.1* 4.5 4.7    107 106 107  --  105   CO2 27.0 25.0 24.0 22.0  --  23.0   BUN 48* 50* 48* 41*  --  46*   CREATSERUM 2.16* 1.97* 2.06* 2.16*  --  1.94*   CA 10.2 9.8 9.7 9.6  --  9.3   MG  --   --  2.0  --   --  1.8   PHOS  --   --  4.3 4.1  --   --    * 178* 237* 242*  --  189*               Recent Labs   Lab 10/19/24  2243 10/20/24  1145 10/21/24  0424   ALT 14  --  10   AST 15  --  10   ALB 4.6 4.3 3.7             Recent Labs   Lab 10/20/24  1145   CK 46             Recent Labs   Lab 10/19/24  2243   PTP 13.4   INR 1.01       MEDICATIONS ADMINISTERED IN LAST 1 DAY:  amiodarone (Cordarone) 450 mg in dextrose 5% 250 mL infusion       Date Action Dose Route User    10/21/2024 0949 New Bag 1 mg/min Intravenous Ernestine Carlos, STORMY    10/21/2024 0400 Rate/Dose Verify 1 mg/min Intravenous Olya Benoit RN    10/21/2024 0246 New Bag 1 mg/min Intravenous Olya Benoit RN    10/21/2024 0000 Rate/Dose Verify 1 mg/min Intravenous Olya Benoit, STORMY    10/20/2024 2006 New Bag 1 mg/min Intravenous Olya Benoit RN    10/20/2024 2000 Rate/Dose Verify 1 mg/min Intravenous Olya Benoit, STORMY    10/20/2024 1258 New Bag 1 mg/min Intravenous Ernestine Carlos, STORMY          arformoterol (Brovana) 15 MCG/2ML nebulizer solution 15 mcg       Date Action Dose Route User    10/21/2024 0804 Given 15 mcg Nebulization Jasmin Awan RCP    10/20/2024 2113 Given 15 mcg Nebulization Jasmin Woodard RCP    10/20/2024 1034 Given 15 mcg Nebulization Fior Lopez, MARLY          aspirin DR tab 81 mg       Date Action Dose Route User    10/20/2024 2111 Given 81 mg Oral  Olya Benoit RN          atorvastatin (Lipitor) tab 20 mg       Date Action Dose Route User    10/20/2024 2111 Given 20 mg Oral Olya Benoit RN          budesonide (Pulmicort) 0.5 MG/2ML nebulizer suspension 0.5 mg       Date Action Dose Route User    10/21/2024 0804 Given 0.5 mg Nebulization Mertes Jasmin, P    10/20/2024 2053 Given 0.5 mg Nebulization Vance Romaangel, [facility]    10/20/2024 1030 Given 0.5 mg Nebulization Fior Lopez, [facility]          carvedilol (Coreg) tab 12.5 mg       Date Action Dose Route User    10/21/2024 0949 Given 12.5 mg Oral Ernestine Carlos RN    10/20/2024 1740 Given 12.5 mg Oral Ernestine Carlos RN          clopidogrel (Plavix) tab 75 mg       Date Action Dose Route User    10/21/2024 0949 Given 75 mg Oral Ernestine Carlos RN          dextrose 50% injection  mL       Date Action Dose Route User    10/20/2024 1233 Given 50 mL Intravenous Ernestine Carlos RN          digoxin (Lanoxin) tab 125 mcg       Date Action Dose Route User    10/21/2024 0949 Given 125 mcg Oral Ernestine Carlos RN          heparin (Porcine) 5000 UNIT/ML injection 5,000 Units       Date Action Dose Route User    10/21/2024 0536 Given 5,000 Units Subcutaneous (Right Lower Abdomen) Olya Benoit RN    10/20/2024 2112 Given 5,000 Units Subcutaneous (Left Lower Abdomen) Olya Benoit RN    10/20/2024 1458 Given 5,000 Units Subcutaneous (Left Upper Abdomen) Ernestine Carlos RN          insulin aspart (NovoLOG) 100 Units/mL FlexPen 2-10 Units       Date Action Dose Route User    10/21/2024 0624 Given 6 Units Subcutaneous (Right Upper Arm) Olya Benoit RN    10/20/2024 2111 Given 6 Units Subcutaneous (Left Upper Arm) Olya Benoit RN    10/20/2024 1135 Given 8 Units Subcutaneous (Left Lower Abdomen) Ernestine Carlos RN          insulin regular human (Novolin R, Humulin R) 100 UNIT/ML injection 10 Units       Date Action Dose Route User    10/20/2024 1233 Given 10 Units Intravenous  Ernestine Carlos RN          ipratropium-albuterol (Duoneb) 0.5-2.5 (3) MG/3ML inhalation solution 3 mL       Date Action Dose Route User    10/21/2024 0803 Given 3 mL Nebulization Mertes Jasmin, RCP    10/20/2024 2053 Given 3 mL Nebulization Vance, Doungbagai, P    10/20/2024 1513 Given 3 mL Nebulization Vance Doungbagai, P    10/20/2024 1158 Given 3 mL Nebulization Fior Lopez, [facility]          magnesium oxide (Mag-Ox) tab 400 mg       Date Action Dose Route User    10/21/2024 0536 Given 400 mg Oral Olya Benoit RN          methylPREDNISolone sodium succinate (Solu-MEDROL) injection 80 mg       Date Action Dose Route User    10/21/2024 0949 Given 80 mg Intravenous Ernestine Carlos RN    10/21/2024 0429 Given 80 mg Intravenous Olya Benoit RN    10/20/2024 2215 Given 80 mg Intravenous Olya Benoit RN    10/20/2024 1740 Given 80 mg Intravenous Ernestine Carlos RN    10/20/2024 1128 Given 80 mg Intravenous Ernestine Carlos RN          pantoprazole (Protonix) DR tab 40 mg       Date Action Dose Route User    10/21/2024 0623 Given 40 mg Oral Olya Benoit RN          Perflutren Lipid Microsphere (DEFINITY) 6.52 MG/ML injection 1.5 mL       Date Action Dose Route User    10/20/2024 1306 Given 1.5 mL Intravenous ReyesRajesh zavaleta          sodium chloride 0.9% infusion       Date Action Dose Route User    10/20/2024 2000 Rate/Dose Verify (none) Intravenous Olya Benoit RN    10/20/2024 1507 New Bag (none) Intravenous Ernestine Carlos RN          sodium zirconium cyclosilicate (Lokelma) oral packet 10 g       Date Action Dose Route User    10/20/2024 2216 Given 10 g Oral Olya Benoit RN    10/20/2024 1458 Given 10 g Oral Ernestine Carlos RN            Vitals (last day)       Date/Time Temp Pulse Resp BP SpO2 Weight O2 Device O2 Flow Rate (L/min) [facility]    10/21/24 0949 -- 60 -- -- -- -- -- -- HB    10/21/24 0800 97.5 °F (36.4 °C) 70 27 156/85 92 % -- Bi-PAP -- HB    10/21/24 0700 -- 71 25  156/85 93 % -- -- -- SH    10/21/24 0641 -- 81 27 -- 92 % -- Bi-PAP -- SH    10/21/24 0600 -- 71 24 140/74 93 % -- -- -- SH    10/21/24 0500 -- 63 20 123/59 97 % -- -- -- SH    10/21/24 0400 97.6 °F (36.4 °C) 60 18 115/58 98 % -- Nasal cannula 2 L/min SH    10/21/24 0300 -- 69 20 143/78 95 % -- -- -- SH    10/21/24 0200 -- 60 17 118/57 98 % -- -- -- SH    10/21/24 0100 -- 60 18 121/58 99 % -- -- -- SH    10/21/24 0000 98.1 °F (36.7 °C) 60 18 129/62 99 % -- Nasal cannula 2 L/min SH    10/20/24 2300 -- 60 21 126/64 100 % -- -- -- SH    10/20/24 2227 -- 77 21 -- 94 % -- Bi-PAP -- DL    10/20/24 2200 -- 62 19 135/63 98 % -- -- -- SH    10/20/24 2100 -- 61 18 134/63 100 % -- -- 2 L/min SH    10/20/24 2059 -- -- -- -- 100 % -- Nasal cannula 3 L/min DL    10/20/24 2053 -- 60 17 -- 99 % -- -- -- DL    10/20/24 2000 97 °F (36.1 °C) 64 18 139/72 98 % -- Bi-PAP -- SH    10/20/24 1900 -- 67 29 148/74 95 % -- -- -- HB    10/20/24 1800 -- 71 20 133/71 99 % -- Nasal cannula 5 L/min HB    10/20/24 1700 -- 63 13 122/61 99 % -- Nasal cannula 5 L/min HB    10/20/24 1600 97.2 °F (36.2 °C) 60 16 119/63 99 % -- Bi-PAP -- HB    10/20/24 1513 -- -- -- 112/64 -- -- -- -- HB    10/20/24 1513 -- 63 19 -- 99 % -- Bi-PAP -- DL    10/20/24 1500 -- 62 19 -- 98 % -- Bi-PAP -- HB    10/20/24 1400 -- 66 21 131/77 99 % -- Nasal cannula 5 L/min HB    10/20/24 1300 -- 63 19 126/64 100 % -- Nasal cannula 5 L/min HB    10/20/24 1208 -- 60 15 -- 100 % 106 lb 11.2 oz (48.4 kg) -- -- HB    10/20/24 1200 96 °F (35.6 °C) 60 16 108/63 99 % -- Bi-PAP -- HB    10/20/24 1158 -- 60 24 -- 99 % -- -- -- SB    10/20/24 1100 -- 60 20 106/55 97 % -- Bi-PAP -- HB    10/20/24 1000 -- 60 22 123/58 95 % -- Bi-PAP -- HB    10/20/24 0938 -- -- -- -- -- -- Bi-PAP -- HB    10/20/24 0938 -- 60 27 -- 94 % -- -- -- SB    10/20/24 0925 -- 60 30 134/58 96 % -- -- -- HB    10/20/24 0900 -- 61 27 -- 97 % -- -- -- HB    10/20/24 0800 95.8 °F (35.4 °C) 67 23 119/54 96 % -- Nasal  cannula 5 L/min HB    10/20/24 0700 -- 64 16 124/59 95 % -- -- -- JS    10/20/24 0650 -- -- -- 108/54 -- -- -- -- JS    10/20/24 0600 -- 63 16 108/54 91 % -- Nasal cannula 2 L/min JS    10/20/24 0500 -- 60 17 101/42 95 % -- -- -- HB    10/20/24 0400 98.1 °F (36.7 °C) 63 13 102/50 97 % -- None (Room air) -- JS    10/20/24 0300 -- 64 19 106/38 97 % -- -- -- JS    10/20/24 0230 -- 67 17 102/63 96 % -- -- -- JS    10/20/24 0215 -- 65 22 106/45 97 % -- -- -- JS    10/20/24 0200 -- 72 23 125/63 96 % -- -- -- JS    10/20/24 0145 -- 70 25 131/53 93 % -- Nasal cannula 1 L/min     10/20/24 0130 98 °F (36.7 °C) 61 23 108/46 97 % 106 lb 11.2 oz (48.4 kg) None (Room air) -- JS    10/20/24 0115 -- 64 20 108/64 96 % -- None (Room air) -- CK    10/20/24 0000 -- 70 21 113/51 95 % -- None (Room air) -- CK

## 2024-10-21 NOTE — PROGRESS NOTES
Cardiology Progress Note        Mingo White Patient Status:  Inpatient    1945 MRN [id number]   Formerly [facility] 6NE-A Attending Rehana Sanford*   Hosp Day # 1 PCP Daren Espino MD     Subjective:  On Bipap, breathing comfortably.  Tele negative, brief NSVT.BP stable.    Medications:   heparin  5,000 Units Subcutaneous Q8H JENNIFFER    aspirin  81 mg Oral Nightly    atorvastatin  20 mg Oral Nightly    carvedilol  12.5 mg Oral BID    clopidogrel  75 mg Oral Daily    pantoprazole  40 mg Oral QAM AC    insulin aspart  2-10 Units Subcutaneous TID AC and HS    digoxin  125 mcg Oral Q MWF    ipratropium-albuterol  3 mL Nebulization QID    methylPREDNISolone  80 mg Intravenous Q6H    budesonide  0.5 mg Nebulization 2 times daily    arformoterol  15 mcg Nebulization 2 times daily       Continuous Infusions:   amiodarone 1 mg/min (10/21/24 0400)         Allergies:  Allergies[1]      Intake/Output:    Intake/Output Summary (Last 24 hours) at 10/21/2024 0747  Last data filed at 10/21/2024 0500  Gross per 24 hour   Intake 1620.19 ml   Output 750 ml   Net 870.19 ml           Last 3 Weights   10/20/24 1208 106 lb 11.2 oz (48.4 kg)   10/20/24 0130 106 lb 11.2 oz (48.4 kg)   10/19/24 2239 112 lb 7 oz (51 kg)   11/15/23 0532 113 lb 1.6 oz (51.3 kg)   23 0426 115 lb 4.8 oz (52.3 kg)   23 2346 122 lb (55.3 kg)   22 0852 124 lb (56.2 kg)            Physical Exam:    Temp:  [95.8 °F (35.4 °C)-98.1 °F (36.7 °C)] 97.6 °F (36.4 °C)  Pulse:  [60-81] 71  Resp:  [13-30] 25  BP: (106-156)/(54-85) 156/85  SpO2:  [92 %-100 %] 93 %  FiO2 (%):  [30 %] 30 %    General: No apparent distress  HEENT: No focal deficits.  Neck: supple. JVP normal  Cardiac: Regular rhythm, S1, S2 normal,  rub or gallop.  No murmur.  Lungs: diminished  Abdomen: Soft, non-tender.   Extremities: No edema  Neurologic: no focal deficits  Skin: Warm and dry.     Telemetry: reviewed    EKG:      Echo:      Cardiac  Cath:      Labs:  HEM:  Recent Labs   Lab 10/19/24  2243 10/21/24  0424   WBC 7.5 11.3*   HGB 12.8* 11.6*   .0* 109.0*       Chem:  Recent Labs   Lab 10/19/24  2243 10/20/24  0416 10/20/24  0851 10/20/24  1145 10/20/24  1456 10/21/24  0424    134* 135* 133*  --  131*   K 4.9 5.7* 5.8* 6.1* 4.5 4.7    107 106 107  --  105   CO2 27.0 25.0 24.0 22.0  --  23.0   BUN 48* 50* 48* 41*  --  46*   CREATSERUM 2.16* 1.97* 2.06* 2.16*  --  1.94*   CA 10.2 9.8 9.7 9.6  --  9.3   MG  --   --  2.0  --   --  1.8   PHOS  --   --  4.3 4.1  --   --    * 178* 237* 242*  --  189*       Recent Labs   Lab 10/19/24  2243 10/20/24  1145 10/21/24  0424   ALT 14  --  10   AST 15  --  10   ALB 4.6 4.3 3.7       Recent Labs   Lab 10/20/24  1145   CK 46       Recent Labs   Lab 10/19/24  2243   PTP 13.4   INR 1.01                 Impression:  VT/AICD - multiple shocks PTA, one in the ER.  Dr. Ureña following, appreciate.  Device reprogrammed.  On IV amiodarone.  Acute hypercapneic/hypoxic resp failure - long time smoker with COPD, quit 11/23.  On Bipap, nebs, and steroids per pulmonary.  CAD with ischemic cardiomyopathy, EF near 20%.  CABG 2006 in [place].  Endovascular AAA repair 2008.  Known left GRIFFIN  Acute on CRI - stable.  H/o hyperkalemia limiting aggressive GDMT.          Plan:  Will touch base with [facility] regarding their plans.  Keep volume neutral at this point.  Continue beta blocker, ASA, plavix.  Pending recovery, may proceed with angiogram.  See if we can get his CABG report.        Alexandru Rogers MD  10/21/2024  7:47 AM  Cr care 30 min.         [1]   Allergies  Allergen Reactions    Niaspan [Niacin, Antihyperlipidemic] ITCHING and FACE FLUSHING    Heparin UNKNOWN     States possible allergy to heparin. Blood sugar elevated when he was given heparin

## 2024-10-21 NOTE — PROGRESS NOTES
I was asked by Dr. Cruz to evaluate for VT    Mingo White is a 79 year old male with PMhx as below referred for VT.  Long standing CM, recently saw Dr Pedroza with slow VT with plan for LV lead upgrade.  Cutoff lowered to allow for VT to be treated and yesterday had shocks x 4.    Also seeing Dr Perera for CHF  (no records available) and he has been having increasing WALTERS with self dosing of diuretics.  Per his report lasix 40qam and 20qpm prn.    No device interrogation available, per report it showed \"normal function\".  He was started on an amio gtt in the ED.  Claims compliance with home meds.    The patient otherwise denies chest pain, shortness of breath, palpitations, lightheadedness, syncope, orthopnea, paroxysmal nocturnal dyspnea, or edema.    All systems were reviewed and are negative except as described above.    10/21/24  feeling  well today PRN BiPAP. No CP  a little SOB   Denies palpitations or racing          Past Medical History:    Anesthesia complication    was \"out of it\" for a long time after sedation with previous colonoscopies    Back problem    Carotid artery disease (HCC)    Congestive heart disease (HCC)    CORONARY ARTERY DISEASE    Coronary atherosclerosis    DIABETES    Diabetes (HCC)    Gastric ulcer    HEART ATTACK    age 61    High blood pressure    HYPERLIPIDEMIA    Sinus drainage    with weather changes         Medications:  No current outpatient medications on file.       Social: The Patient denies tobacco, illicit drug use, and alcohol abuse    Family: The patient denies a history of sudden cardiac death or premature coronary artery disease    Allergies:  Allergies[1]       Physical:  /74 (BP Location: Right arm)   Pulse 76   Temp 97.6 °F (36.4 °C) (Temporal)   Resp 26   Wt 106 lb 11.2 oz (48.4 kg)   SpO2 92%   BMI 17.22 kg/m²   GENERAL: well developed, well nourished, in no apparent distress  EYES: sclera anicteric  HEENT: normocephalic  NECK: no JVD, no  carotid bruits, no thyromegaly  RESPIRATORY: clear to auscultation  CARDIOVASCULAR: S1, S2 normal, RRR; no S3, no S4; no click; no murmur  ABDOMEN: normal active BS, soft, nondistended; nontender  EXTREMITIES: no cyanosis, clubbing or edema, peripheral pulses intact  NEURO: no sensorimotor deficits  PSYCHIATRIC: alert and oriented x 3, affect normal  SKIN: no rashes ICD site well healed     Data:  No results found for: \"TSH\"  BUN (mg/dL)   Date Value   10/21/2024 46 (H)   10/20/2024 41 (H)     Blood Urea Nitrogen (mg/dL)   Date Value   03/14/2022 30.0 (H)   11/16/2021 36.0 (H)   05/17/2016 31 (H)   01/20/2016 28 (H)     Creatinine, Serum (mg/dL)   Date Value   05/17/2016 1.38 (H)   01/20/2016 1.35 (H)     Creatinine (mg/dL)   Date Value   10/21/2024 1.94 (H)   10/20/2024 2.16 (H)   03/14/2022 1.39 (H)   11/16/2021 1.50 (H)       ECG: (personally reviewed): NSR LAE V pacing QRS > 170  Tele - NSVT x 20 beats NSR AsVp BBB   Event:    Echo: 10/21/24       Conclusions:     1. Left ventricle: The cavity size was mildly to moderately increased.      Systolic function was markedly reduced. The estimated ejection fraction      was 15-20%, by visual assessment. There was severe diffuse hypokinesis      with regional variations. There was no evidence of a thrombus revealed by      acoustic contrast opacification.   2. Right ventricle: Pacer C/W ICD noted in the right ventricle.   3. Left atrium: The left atrial volume was mildly increased.   4. Right atrium: Pacer C/W ICD noted in right atrium.   5. Mitral valve: There was mild to moderate regurgitation.   6. Pulmonary arteries: Systolic pressure was mildly increased, in the range      of 35mm Hg to 40mm Hg.   Impressions:  This study is compared with previous dated 10/20/2024: No   significant change in ejection fraction.       Impression:  ICM s/p DCICD 2007, [employer]   VT - per report approrriate shocks, now on amio gtt  CKD Cr 2's  Hyperkalemia on intake labs  COPD quit  2023  IVCD      Plan:  ICM - appears sob at baseline, has orthopnea.  Creatinine elevated.  At home taking lasix.  Change to IV today for marked WALTERS.  Taking coreg, lis per report, not on further GDMT at this time for unclear reasons.  Consider further GDMT based on course and Cr limitations.  VT - obtain formal interrogation.  Amiodarone  to PO  potential SE reviewed liver lung skin thyroid eye toxicity etc.  Need for reg follow . 6/ 6 successful  shocks   also some ATP successful.   CKD - consult renal.  Cardiorenal picture  Hyperkalemia - repeat K.normalized  D/W  RN and Dr Rogers at bedside        [1]   Allergies  Allergen Reactions    Niaspan [Niacin, Antihyperlipidemic] ITCHING and FACE FLUSHING    Heparin UNKNOWN     States possible allergy to heparin. Blood sugar elevated when he was given heparin

## 2024-10-22 LAB
ALBUMIN SERPL-MCNC: 3.5 G/DL (ref 3.2–4.8)
ANION GAP SERPL CALC-SCNC: 14 MMOL/L (ref 0–18)
ANION GAP SERPL CALC-SCNC: 8 MMOL/L (ref 0–18)
BASOPHILS # BLD AUTO: 0.01 X10(3) UL (ref 0–0.2)
BASOPHILS NFR BLD AUTO: 0.1 %
BUN BLD-MCNC: 43 MG/DL (ref 9–23)
BUN BLD-MCNC: 60 MG/DL (ref 9–23)
CALCIUM BLD-MCNC: 9.2 MG/DL (ref 8.7–10.4)
CALCIUM BLD-MCNC: 9.4 MG/DL (ref 8.7–10.4)
CHLORIDE SERPL-SCNC: 102 MMOL/L (ref 98–112)
CHLORIDE SERPL-SCNC: 102 MMOL/L (ref 98–112)
CO2 SERPL-SCNC: 14 MMOL/L (ref 21–32)
CO2 SERPL-SCNC: 22 MMOL/L (ref 21–32)
CREAT BLD-MCNC: 2.29 MG/DL
CREAT BLD-MCNC: 2.32 MG/DL
EGFRCR SERPLBLD CKD-EPI 2021: 28 ML/MIN/1.73M2 (ref 60–?)
EGFRCR SERPLBLD CKD-EPI 2021: 28 ML/MIN/1.73M2 (ref 60–?)
EOSINOPHIL # BLD AUTO: 0 X10(3) UL (ref 0–0.7)
EOSINOPHIL NFR BLD AUTO: 0 %
ERYTHROCYTE [DISTWIDTH] IN BLOOD BY AUTOMATED COUNT: 13.8 %
GLUCOSE BLD-MCNC: 197 MG/DL (ref 70–99)
GLUCOSE BLD-MCNC: 205 MG/DL (ref 70–99)
GLUCOSE BLD-MCNC: 207 MG/DL (ref 70–99)
GLUCOSE BLD-MCNC: 219 MG/DL (ref 70–99)
GLUCOSE BLD-MCNC: 251 MG/DL (ref 70–99)
GLUCOSE BLD-MCNC: 251 MG/DL (ref 70–99)
HCT VFR BLD AUTO: 33.9 %
HGB BLD-MCNC: 11.1 G/DL
IMM GRANULOCYTES # BLD AUTO: 0.09 X10(3) UL (ref 0–1)
IMM GRANULOCYTES NFR BLD: 0.8 %
LYMPHOCYTES # BLD AUTO: 0.48 X10(3) UL (ref 1–4)
LYMPHOCYTES NFR BLD AUTO: 4 %
MAGNESIUM SERPL-MCNC: 2 MG/DL (ref 1.6–2.6)
MCH RBC QN AUTO: 29.4 PG (ref 26–34)
MCHC RBC AUTO-ENTMCNC: 32.7 G/DL (ref 31–37)
MCV RBC AUTO: 89.9 FL
MONOCYTES # BLD AUTO: 0.43 X10(3) UL (ref 0.1–1)
MONOCYTES NFR BLD AUTO: 3.6 %
NEUTROPHILS # BLD AUTO: 10.96 X10 (3) UL (ref 1.5–7.7)
NEUTROPHILS # BLD AUTO: 10.96 X10(3) UL (ref 1.5–7.7)
NEUTROPHILS NFR BLD AUTO: 91.5 %
OSMOLALITY SERPL CALC.SUM OF ELEC: 286 MOSM/KG (ref 275–295)
OSMOLALITY SERPL CALC.SUM OF ELEC: 297 MOSM/KG (ref 275–295)
PHOSPHATE SERPL-MCNC: 5.5 MG/DL (ref 2.4–5.1)
PLATELET # BLD AUTO: 111 10(3)UL (ref 150–450)
POTASSIUM SERPL-SCNC: 4.4 MMOL/L (ref 3.5–5.1)
POTASSIUM SERPL-SCNC: 4.8 MMOL/L (ref 3.5–5.1)
RBC # BLD AUTO: 3.77 X10(6)UL
SODIUM SERPL-SCNC: 130 MMOL/L (ref 136–145)
SODIUM SERPL-SCNC: 132 MMOL/L (ref 136–145)
WBC # BLD AUTO: 12 X10(3) UL (ref 4–11)

## 2024-10-22 PROCEDURE — 94640 AIRWAY INHALATION TREATMENT: CPT

## 2024-10-22 PROCEDURE — 97161 PT EVAL LOW COMPLEX 20 MIN: CPT

## 2024-10-22 PROCEDURE — 97165 OT EVAL LOW COMPLEX 30 MIN: CPT

## 2024-10-22 PROCEDURE — 97116 GAIT TRAINING THERAPY: CPT

## 2024-10-22 PROCEDURE — 80069 RENAL FUNCTION PANEL: CPT | Performed by: INTERNAL MEDICINE

## 2024-10-22 PROCEDURE — 94003 VENT MGMT INPAT SUBQ DAY: CPT

## 2024-10-22 PROCEDURE — 83735 ASSAY OF MAGNESIUM: CPT | Performed by: INTERNAL MEDICINE

## 2024-10-22 PROCEDURE — 85025 COMPLETE CBC W/AUTO DIFF WBC: CPT | Performed by: HOSPITALIST

## 2024-10-22 PROCEDURE — 82962 GLUCOSE BLOOD TEST: CPT

## 2024-10-22 RX ORDER — PREDNISONE 20 MG/1
40 TABLET ORAL
Status: COMPLETED | OUTPATIENT
Start: 2024-10-23 | End: 2024-10-27

## 2024-10-22 RX ORDER — IPRATROPIUM BROMIDE AND ALBUTEROL SULFATE 2.5; .5 MG/3ML; MG/3ML
3 SOLUTION RESPIRATORY (INHALATION)
Status: DISCONTINUED | OUTPATIENT
Start: 2024-10-23 | End: 2024-10-26

## 2024-10-22 RX ORDER — AMIODARONE HYDROCHLORIDE 200 MG/1
400 TABLET ORAL 3 TIMES DAILY
Status: DISPENSED | OUTPATIENT
Start: 2024-10-22 | End: 2024-10-26

## 2024-10-22 NOTE — PROGRESS NOTES
Critical access hospital and [facility]  Hospitalist Progress Note                                                                     [facility]   part of [facility]        [facility]  6/5/1945    SUBJECTIVE:  Seen and examined at bedside.  Sitting in chair, getting DuoNeb treatment.  Overall feels much better today.  No longer needing BiPAP.      OBJECTIVE:  Temp:  [97 °F (36.1 °C)-98.4 °F (36.9 °C)] 97 °F (36.1 °C)  Pulse:  [60-80] 68  Resp:  [14-26] 22  BP: ()/() 122/59  SpO2:  [93 %-100 %] 93 %  FiO2 (%):  [30 %] 30 %  Exam  Gen: No acute distress, alert and oriented x3, no focal neurologic deficits  Pulm: Clear to auscultation, getting breathing treatment   CV: Heart with regular rate and rhythm, no murmur.  Normal PMI.    Abd: Abdomen soft, nontender, nondistended, no organomegaly, bowel sounds present  MSK: Full range of motion in extremities, no clubbing, no cyanosis  Skin: no rashes or lesions    Labs:   Recent Labs   Lab 10/19/24  2243 10/21/24  0424 10/22/24  0438   WBC 7.5 11.3* 12.0*   HGB 12.8* 11.6* 11.1*   MCV 87.3 87.9 89.9   .0* 109.0* 111.0*   INR 1.01  --   --        Recent Labs   Lab 10/20/24  0851 10/20/24  1145 10/20/24  1456 10/21/24  0424 10/22/24  0438 10/22/24  0824   * 133*  --  131* 130* 132*   K 5.8* 6.1* 4.5 4.7 4.8 4.4    107  --  105 102 102   CO2 24.0 22.0  --  23.0 14.0* 22.0   BUN 48* 41*  --  46* 43* 60*   CREATSERUM 2.06* 2.16*  --  1.94* 2.32* 2.29*   CA 9.7 9.6  --  9.3 9.2 9.4   MG 2.0  --   --  1.8 2.0  --    PHOS 4.3 4.1  --   --  5.5*  --    * 242*  --  189* 197* 207*       Recent Labs   Lab 10/19/24  2243 10/20/24  1145 10/21/24  0424 10/22/24  0438   ALT 14  --  10  --    AST 15  --  10  --    ALB 4.6 4.3 3.7 3.5       Recent Labs   Lab 10/21/24  1142 10/21/24  1622 10/21/24  2131 10/22/24  0639 10/22/24  1109   PGLU 233* 211* 240* 205* 219*       Meds:   Scheduled:    amiodarone  400 mg Oral  TID    [START ON 10/23/2024] predniSONE  40 mg Oral Daily with breakfast    ipratropium-albuterol  3 mL Nebulization 6 times per day    heparin  5,000 Units Subcutaneous Q8H JENNIFFER    aspirin  81 mg Oral Nightly    atorvastatin  20 mg Oral Nightly    carvedilol  12.5 mg Oral BID    clopidogrel  75 mg Oral Daily    pantoprazole  40 mg Oral QAM AC    insulin aspart  2-10 Units Subcutaneous TID AC and HS    methylPREDNISolone  80 mg Intravenous Q6H    budesonide  0.5 mg Nebulization 2 times daily    arformoterol  15 mcg Nebulization 2 times daily     Continuous Infusions:       PRN:   acetaminophen **OR** HYDROcodone-acetaminophen **OR** HYDROcodone-acetaminophen    metoclopramide    albuterol    fentaNYL    Assessment/Plan:  Principal Problem:    Ventricular tachyarrhythmia (HCC)  Active Problems:    V-tach (HCC)    Ischemic cardiomyopathy with implantable cardioverter-defibrillator (ICD)    79 year old male with PMH sig for ischemic cardiomyopathy with an EF of 25%, history of VT status post AICD, chronic systolic heart failure, AAA status post EVAR, diabetes mellitus type 2, dyslipidemia, left renal artery stenosis presented after his ICD fired 4 times.      Ventricular tachycardia with appropriate shocks  S/p DCICD  - cont amio infusion, wean per cardiology  - coreg  - echo reviewed  - Evaluated by electrophysiologist, plan for upgrade to CRT pacemaker likely Friday  - further management per cardiology      Acute Hypercapnic Respiratory failure from acute COPD exacerbation  - pulmonology following  -Off BiPAP,  - IV steroids, taper per pulm  - budesonide/brovana     ICMP   Chronic systolic heart failure  - cardiology following  - further diuresis per cardiology/nephrology   - daily weights, I/Os  - coreg  -2D echo with EF 15-20% with severe wall motion abnormality, see echo report for details,-this was previously noted on echo  - maximize GDMT as bp permits     MAIA on CKD with hyperkalemia  - s/p lokelma x3 doses per  nephrology   - hold lisinopril   - I/Os, UOP  - Creatinine improving  - nephrology following, case discussed     DM2 with hyperglycemia  - ISS/accucheks     Hyperlipidemia  - statin     AAA s/p EVAR  - stable     FEN: regular diet, PT/OT  Proph: SCDs, heparin  Code status: Full code     Karina Rothman DO  [facility]

## 2024-10-22 NOTE — PLAN OF CARE
Assumed patient care at 0730. Patient resting in bed, alert/oriented. O2 weaned off, denies SOB, lungs sound clear/diminised. Monitor shows paced rhythm, occasional runs of NSVT, asymptomatic. Amio dose increased. Tolerating diet. Voids in urinal. Bm x1. Denies pain. Ambulating with standby assist and walker. Patient updated on plan of care, all questions answered.    lvm for pt to call back about imaging and urology referrals from June... are they still needed has anyone contacted the patient from [facility]. Did they receive my letter to call schedule one back in June when I sent it.    Hub to read

## 2024-10-22 NOTE — PHYSICAL THERAPY NOTE
PHYSICAL THERAPY EVALUATION - INPATIENT     Room Number: 6615/6615-A  Evaluation Date: 10/22/2024  Type of Evaluation: Initial  Physician Order: PT Eval and Treat    Presenting Problem: Ventricular tachycardia  Co-Morbidities : ischemic cardiomyopathy with an EF of 25%, history of VT status post AICD, chronic systolic heart failure, AAA status post EVAR, diabetes mellitus type 2, dyslipidemia, left renal artery stenosis  Reason for Therapy: Mobility Dysfunction and Discharge Planning    PHYSICAL THERAPY ASSESSMENT   Patient is a 79 year old male admitted 10/19/2024 for ventricular tachyarrythmia.  Prior to admission, patient's baseline is indep.  Patient is currently functioning below baseline with gait.  Patient is requiring supervision and use of RW as a result of the following impairments: decreased endurance/aerobic capacity and increased O2 needs from baseline.      Patient has met all skilled IPPT goals at this time. Patient will be discharged from Physical Therapy services.  Please re-order if a new functional limitation presents during this admission.      PLAN DURING HOSPITALIZATION  Nursing Mobility Recommendation : 1 Assist  PT Device Recommendation: Rolling walker                PHYSICAL THERAPY MEDICAL/SOCIAL HISTORY  History related to current admission: Patient is a 79 year old male admitted on 10/19/2024: Presented after his ICD fired 4x dx ventricular tachyarrhythmia    HOME SITUATION  Type of Home: House ([facility])  Home Layout: One level  Stairs to Enter : 1                  Lives With: Spouse    Drives: Yes   Patient Regularly Uses: Reading glasses;Rolling walker (wife has a RW from a knee injury but pt would be able to use if needed)      Prior Level of [place]: Normally indep, enjoys going on the treadmill does 4 miles/week, likes to do the [place] [employer] cross word puzzles    SUBJECTIVE  \"I feel less short of breath today\"     OBJECTIVE  Precautions: Bed/chair alarm  Fall Risk: Standard fall  risk    WEIGHT BEARING RESTRICTION     PAIN ASSESSMENT  Ratin          COGNITION  Overall Cognitive Status:  WFL - within functional limits    RANGE OF MOTION AND STRENGTH ASSESSMENT  Upper extremity ROM and strength are within functional limits     Lower extremity ROM is within functional limits     Lower extremity strength is within functional limits     BALANCE  Static Sitting: Good  Dynamic Sitting: Good  Static Standing: Good  Dynamic Standing: Fair -    ADDITIONAL TESTS                                    ACTIVITY TOLERANCE           BP: 125/68  BP Location: Left arm  BP Method: Automatic  Patient Position: Standing    O2 WALK  Oxygen Therapy  SPO2% on Room Air at Rest: 98  SPO2% on Oxygen at Rest: 3.5  SPO2% Ambulation on Oxygen: 90 (RPE 3)  Ambulation oxygen flow (liters per minute): 4    NEUROLOGICAL FINDINGS                        AM-PAC '6-Clicks' INPATIENT SHORT FORM - BASIC MOBILITY  How much difficulty does the patient currently have...  Patient Difficulty: Turning over in bed (including adjusting bedclothes, sheets and blankets)?: None   Patient Difficulty: Sitting down on and standing up from a chair with arms (e.g., wheelchair, bedside commode, etc.): None   Patient Difficulty: Moving from lying on back to sitting on the side of the bed?: None   How much help from another person does the patient currently need...   Help from Another: Moving to and from a bed to a chair (including a wheelchair)?: None   Help from Another: Need to walk in hospital room?: None   Help from Another: Climbing 3-5 steps with a railing?: A Little     AM-PAC Score:  Raw Score: 23   Approx Degree of Impairment: 11.2%   Standardized Score (AM-PAC Scale): 56.93   CMS Modifier (G-Code): CI    FUNCTIONAL ABILITY STATUS  Gait Assessment   Functional Mobility/Gait Assessment  Gait Assistance: Supervision  Distance (ft): 150  Assistive Device: Rolling walker;None  Pattern: Within Functional Limits    Skilled Therapy Provided      Bed Mobility:  Rolling: NT  Supine to sit: NT   Sit to supine: NT     Transfer Mobility:  Sit to stand: SBA   Stand to sit: SBA  Gait = SBA    Therapist's Comments: Discussed case with RN prior to session initiation. Pt agreeable to participation in therapy. pt educated on energy conservation techniques including slower gait speed, standing/seated rest breaks, pacing activities, and use of assistive device. Able to participate in gait training without device with no balance deficits demonstrated however pt reported inc perceived effort. Recommend continued use of RW for endurance.     Exercise/Education Provided:  Energy conservation  Functional activity tolerated  Gait training    Patient End of Session: Up in chair;Needs met;Call light within reach;RN aware of session/findings;All patient questions and concerns addressed;Alarm set;Hospital anti-slip socks      Patient Evaluation Complexity Level:  History Low - no personal factors and/or co-morbidities   Examination of body systems Low -  addressing 1-2 elements   Clinical Presentation Low- Stable   Clinical Decision Making Low Complexity       PT Session Time: 24 minutes  Gait Training: 10 minutes  Therapeutic Activity:  minutes  Neuromuscular Re-education:  minutes  Therapeutic Exercise:  minutes

## 2024-10-22 NOTE — DIETARY NOTE
[facility]   part of [facility]   CLINICAL NUTRITION    [facility]     Admitting diagnosis:  Ventricular tachyarrhythmia (HCC) [I47.20]  Ischemic cardiomyopathy with implantable cardioverter-defibrillator (ICD) [I25.5, Z95.810]    Ht:  5'6\"  Wt: 48.4 kg (106 lb 11.2 oz).   Body mass index is 17.22 kg/m².  IBW: 64.5 kg    Wt Readings from Last 6 Encounters:   10/20/24 48.4 kg (106 lb 11.2 oz)   11/15/23 51.3 kg (113 lb 1.6 oz)   03/21/22 56.2 kg (124 lb)   11/22/21 56.2 kg (124 lb)   09/01/21 57.6 kg (127 lb)   07/26/21 57.6 kg (127 lb)        Labs/Meds reviewed    Diet:       Procedures    Sodium restricted diet Sodium Restriction: 2 GM NA; Is Patient on Accuchecks? Yes; Misc Restriction: Low Potassium     Percent Meals Eaten (last 3 days)       Date/Time Percent Meals Eaten (%)    10/21/24 1600 50 %            Pt chart reviewed d/t Low BMI.  Patient reports fair appetite at this time.  Nursing notes reports Percent Meals Eaten (%): 50 % intake for last meal.  Tolerating po diet without diarrhea, emesis, or constipation.   No significant weight changes noted.     PMH includes HLD, CAD, DM, Gastric Ulcer, HF.  Pt seen for Low BMI.  Pt reports his appetite is doing better. Doesn't eat breakfast, but typically eats lunch and dinner.  No n/v/d.  Pt reports his wt is up and down due to fluid status.  Is very well versed in his diet, and wife at bedside agrees.  Questions answered re: diet.  Encoruage PO all meals.    Patient is at low nutrition risk at this time.    Please consult if patient status changes or nutrition issues arise.    Ely Kay RD, LDN, [facility]  Clinical Dietitian  Phone [phone]

## 2024-10-22 NOTE — PROGRESS NOTES
Feels today is best day yet. Less dyspnea on exertion   Some non-sustained ventricular tachycardia this am          Past Medical History:    Anesthesia complication    was \"out of it\" for a long time after sedation with previous colonoscopies    Back problem    Carotid artery disease (HCC)    Congestive heart disease (HCC)    CORONARY ARTERY DISEASE    Coronary atherosclerosis    DIABETES    Diabetes (HCC)    Gastric ulcer    HEART ATTACK    age 61    High blood pressure    HYPERLIPIDEMIA    Sinus drainage    with weather changes         Medications:  No current outpatient medications on file.       Social: The Patient denies tobacco, illicit drug use, and alcohol abuse    Family: The patient denies a history of sudden cardiac death or premature coronary artery disease    Allergies:  Allergies[1]       Physical:  /70   Pulse 62   Temp 97 °F (36.1 °C) (Temporal)   Resp 18   Wt 106 lb 11.2 oz (48.4 kg)   SpO2 99%   BMI 17.22 kg/m²   GENERAL: well developed, well nourished, in no apparent distress  EYES: sclera anicteric  HEENT: normocephalic  NECK: no JVD, no carotid bruits, no thyromegaly  RESPIRATORY: clear to auscultation  CARDIOVASCULAR: S1, S2 normal, RRR; no S3, no S4; no click; no murmur  ABDOMEN: normal active BS, soft, nondistended; nontender  EXTREMITIES: no cyanosis, clubbing or edema, peripheral pulses intact  NEURO: no sensorimotor deficits  PSYCHIATRIC: alert and oriented x 3, affect normal  SKIN: no rashes ICD site well healed     Data:  No results found for: \"TSH\"  BUN (mg/dL)   Date Value   10/22/2024 60 (H)   10/22/2024 43 (H)     Blood Urea Nitrogen (mg/dL)   Date Value   03/14/2022 30.0 (H)   11/16/2021 36.0 (H)   05/17/2016 31 (H)   01/20/2016 28 (H)     Creatinine, Serum (mg/dL)   Date Value   05/17/2016 1.38 (H)   01/20/2016 1.35 (H)     Creatinine (mg/dL)   Date Value   10/22/2024 2.29 (H)   10/22/2024 2.32 (H)   03/14/2022 1.39 (H)   11/16/2021 1.50 (H)       ECG: NSR LAE V pacing  QRS > 170  Tele - NSVT  Echo: 10/21/24       Conclusions:     1. Left ventricle: The cavity size was mildly to moderately increased.      Systolic function was markedly reduced. The estimated ejection fraction      was 15-20%, by visual assessment. There was severe diffuse hypokinesis      with regional variations. There was no evidence of a thrombus revealed by      acoustic contrast opacification.   2. Right ventricle: Pacer C/W ICD noted in the right ventricle.   3. Left atrium: The left atrial volume was mildly increased.   4. Right atrium: Pacer C/W ICD noted in right atrium.   5. Mitral valve: There was mild to moderate regurgitation.   6. Pulmonary arteries: Systolic pressure was mildly increased, in the range      of 35mm Hg to 40mm Hg.   Impressions:  This study is compared with previous dated 10/20/2024: No   significant change in ejection fraction.       Impression:  ICM s/p DCICD 2007, [employer]   VT  CKD Cr 2's  Hyperkalemia on intake labs  COPD quit 2023  IVCD      Plan:  Amiodarone 400mg tid  Stop digoxin given amiodarone and CKD  Cath if stable Thursday along with LUE venogram  Possible upgrade to CRT-D Friday       [1]   Allergies  Allergen Reactions    Niaspan [Niacin, Antihyperlipidemic] ITCHING and FACE FLUSHING    Heparin UNKNOWN     States possible allergy to heparin. Blood sugar elevated when he was given heparin

## 2024-10-22 NOTE — PROGRESS NOTES
[facility]    Mingo White Patient Status:  Inpatient    1945 MRN [id number]   [facility] 6NE-A Attending Rehana Sanford*   Hosp Day # 2 PCP Daren Espino MD     Critical Care Progress Note     Date of Admission: 10/19/2024 10:37 PM  Admission Diagnosis: Ventricular tachyarrhythmia (HCC) [I47.20]  Ischemic cardiomyopathy with implantable cardioverter-defibrillator (ICD) [I25.5, Z95.810]     S:  Pt states that SOB is improved.  Weaned off bipap.        Current Medications:    Current Facility-Administered Medications:     amiodarone (Pacerone) tab 400 mg, 400 mg, Oral, TID    ipratropium-albuterol (Duoneb) 0.5-2.5 (3) MG/3ML inhalation solution 3 mL, 3 mL, Nebulization, 6 times per day    heparin (Porcine) 5000 UNIT/ML injection 5,000 Units, 5,000 Units, Subcutaneous, Q8H JENNIFFER    acetaminophen (Tylenol) tab 650 mg, 650 mg, Oral, Q4H PRN **OR** HYDROcodone-acetaminophen (Norco) 5-325 MG per tab 1 tablet, 1 tablet, Oral, Q4H PRN **OR** HYDROcodone-acetaminophen (Norco) 5-325 MG per tab 2 tablet, 2 tablet, Oral, Q4H PRN    metoclopramide (Reglan) 5 mg/mL injection 5 mg, 5 mg, Intravenous, Q8H PRN    albuterol (Ventolin HFA) 108 (90 Base) MCG/ACT inhaler 2 puff, 2 puff, Inhalation, Q4H PRN    aspirin DR tab 81 mg, 81 mg, Oral, Nightly    atorvastatin (Lipitor) tab 20 mg, 20 mg, Oral, Nightly    carvedilol (Coreg) tab 12.5 mg, 12.5 mg, Oral, BID    clopidogrel (Plavix) tab 75 mg, 75 mg, Oral, Daily    pantoprazole (Protonix) DR tab 40 mg, 40 mg, Oral, QAM AC    insulin aspart (NovoLOG) 100 Units/mL FlexPen 2-10 Units, 2-10 Units, Subcutaneous, TID AC and HS    methylPREDNISolone sodium succinate (Solu-MEDROL) injection 80 mg, 80 mg, Intravenous, Q6H    budesonide (Pulmicort) 0.5 MG/2ML nebulizer suspension 0.5 mg, 0.5 mg, Nebulization, 2 times daily    arformoterol (Brovana) 15 MCG/2ML nebulizer solution 15 mcg, 15 mcg, Nebulization, 2 times daily    fentaNYL (Sublimaze) 50 mcg/mL  injection 25 mcg, 25 mcg, Intravenous, Q5 Min PRN     OBJECTIVE:  /64   Pulse 67   Temp 97 °F (36.1 °C) (Temporal)   Resp 22   Wt 106 lb 11.2 oz (48.4 kg)   SpO2 98%   BMI 17.22 kg/m²      Ventilator Settings:  3L      Wt Readings from Last 3 Encounters:   10/20/24 106 lb 11.2 oz (48.4 kg)   11/15/23 113 lb 1.6 oz (51.3 kg)   03/21/22 124 lb (56.2 kg)        I/O last 3 completed shifts:  In: 1782 [P.O.:960; I.V.:822]  Out: 1275 [Urine:1275]  I/O this shift:  In: -   Out: 175 [Urine:175]      Physical Exam:                          General: alert, cooperative, in NAD                          HEENT: oropharynx clear without erythema or exudates, moist mucous membranes                          Lungs: CTA bilaterally                          Chest wall: No tenderness or deformity.                          Heart: Regular rate and rhythm, normal S1S2                          Abdomen: soft, non-tender, non-distended, positive BS.                          Extremity: No clubbing or cyanosis. no edema                          Skin: No rashes or lesions.       Lab Results   Component Value Date    WBC 12.0 10/22/2024    RBC 3.77 10/22/2024    HGB 11.1 10/22/2024    HCT 33.9 10/22/2024    MCV 89.9 10/22/2024    MCH 29.4 10/22/2024    MCHC 32.7 10/22/2024    RDW 13.8 10/22/2024    .0 10/22/2024     Lab Results   Component Value Date     10/22/2024    K 4.4 10/22/2024     10/22/2024    CO2 22.0 10/22/2024    BUN 60 10/22/2024    CREATSERUM 2.29 10/22/2024     10/22/2024    CA 9.4 10/22/2024    ALB 3.5 10/22/2024     Lab Results   Component Value Date    INR 1.01 10/19/2024    INR 1.0 (L) 08/08/2008    INR 1.0 (L) 12/11/2007           Imaging: I have independently visualized all relevant chest imaging in PACS.  I agree with the radiology interpretation except where noted.       ASSESSMENT/PLAN:  Acute Hypercapnic resp failure: 2/2 AECOPD  -weaned off bipap, wean O2 as able  -RVP negtive  -CXR  with mild pulmonary vascular congestion without focal infiltrate  -repeat ABG reviewed, improvement in hypercapnea  COPD Exacerbation  -IV steroids, plan to transition to PO prednisone tomorrow if continues to improve  -budesonide/brovana  -BD protocol  CHF w/ mult AICD firings  -echo with EF: 15-20% severe diffuse hypokinesis with regional variations, G1DD  -amiodarone  -Cards following  -monitor electrolytes  MAIA on CKD  -renal following  FEN:  -ADAT   Proph:  -hep sub Q  Dispo:  -stable to transfer to the floor from pulm perspective  -will follow     Tierney Shepard MD

## 2024-10-22 NOTE — PROGRESS NOTES
[facility]   part of [facility]    Nephrology Progress Note    Mingo White Attending:  Rehana Sanford*     Cc: christine, hyperkalemia    SUBJECTIVE     No complaints. States his breathing is better. Notes his hand puffiness better too     PHYSICAL EXAM   Vital signs: /59   Pulse 68   Temp 97 °F (36.1 °C) (Temporal)   Resp 22   Wt 106 lb 11.2 oz (48.4 kg)   SpO2 93%   BMI 17.22 kg/m²   Temp (24hrs), Av.7 °F (36.5 °C), Min:97 °F (36.1 °C), Max:98.4 °F (36.9 °C)       Intake/Output Summary (Last 24 hours) at 10/22/2024 1605  Last data filed at 10/22/2024 1400  Gross per 24 hour   Intake 437.58 ml   Output 1250 ml   Net -812.42 ml     Wt Readings from Last 3 Encounters:   10/20/24 106 lb 11.2 oz (48.4 kg)   11/15/23 113 lb 1.6 oz (51.3 kg)   22 124 lb (56.2 kg)     General: NAD  HEENT: NCAT, EOMI, MMM  Neck: Supple   Cardiac: Regular rate and rhythm   Lungs: CTAB  Abdomen: Soft, non-tender, nondistended   Extremities: No edema  Neurologic: No asterxis  Skin: Warm and dry, no rashes     MEDS     Current Facility-Administered Medications   Medication Dose Route Frequency    amiodarone (Pacerone) tab 400 mg  400 mg Oral TID    [START ON 10/23/2024] predniSONE (Deltasone) tab 40 mg  40 mg Oral Daily with breakfast    ipratropium-albuterol (Duoneb) 0.5-2.5 (3) MG/3ML inhalation solution 3 mL  3 mL Nebulization 6 times per day    heparin (Porcine) 5000 UNIT/ML injection 5,000 Units  5,000 Units Subcutaneous Q8H JENNIFFER    acetaminophen (Tylenol) tab 650 mg  650 mg Oral Q4H PRN    Or    HYDROcodone-acetaminophen (Norco) 5-325 MG per tab 1 tablet  1 tablet Oral Q4H PRN    Or    HYDROcodone-acetaminophen (Norco) 5-325 MG per tab 2 tablet  2 tablet Oral Q4H PRN    metoclopramide (Reglan) 5 mg/mL injection 5 mg  5 mg Intravenous Q8H PRN    albuterol (Ventolin HFA) 108 (90 Base) MCG/ACT inhaler 2 puff  2 puff Inhalation Q4H PRN    aspirin DR tab 81 mg  81 mg Oral Nightly    atorvastatin (Lipitor)  tab 20 mg  20 mg Oral Nightly    carvedilol (Coreg) tab 12.5 mg  12.5 mg Oral BID    clopidogrel (Plavix) tab 75 mg  75 mg Oral Daily    pantoprazole (Protonix) DR tab 40 mg  40 mg Oral QAM AC    insulin aspart (NovoLOG) 100 Units/mL FlexPen 2-10 Units  2-10 Units Subcutaneous TID AC and HS    methylPREDNISolone sodium succinate (Solu-MEDROL) injection 80 mg  80 mg Intravenous Q6H    budesonide (Pulmicort) 0.5 MG/2ML nebulizer suspension 0.5 mg  0.5 mg Nebulization 2 times daily    arformoterol (Brovana) 15 MCG/2ML nebulizer solution 15 mcg  15 mcg Nebulization 2 times daily    fentaNYL (Sublimaze) 50 mcg/mL injection 25 mcg  25 mcg Intravenous Q5 Min PRN       LABS     Lab Results   Component Value Date    WBC 12.0 10/22/2024    HGB 11.1 10/22/2024    HCT 33.9 10/22/2024    .0 10/22/2024    CREATSERUM 2.29 10/22/2024    BUN 60 10/22/2024     10/22/2024    K 4.4 10/22/2024     10/22/2024    CO2 22.0 10/22/2024     10/22/2024    CA 9.4 10/22/2024    ALB 3.5 10/22/2024    MG 2.0 10/22/2024    PHOS 5.5 10/22/2024    PGLU 219 10/22/2024       IMAGING   All imaging studies personally reviewed.    XR CHEST AP PORTABLE  (CPT=71045)   Final Result   PROCEDURE:  XR CHEST AP PORTABLE  (CPT=71045)       TECHNIQUE:  AP chest radiograph was obtained.       COMPARISON:  [facility] , XR, XR CHEST AP PORTABLE  (CPT=71045), 10/20/2024,    9:46 AM.       INDICATIONS:  hypoxic       PATIENT STATED HISTORY: (As transcribed by Technologist)  Patient offered    no additional history at this time.             FINDINGS:  Support devices appear overall stable.  Postsurgical changes of    chest are noted.  Heart size is within normal limits.  There is a question    of slight increased prominence of the pulmonary vasculature.  This may    reflect mild vascular congestion    and volume overload.  Mild basilar atelectasis is favored.  No new focal    consolidation or pleural effusion is seen.                         =====    CONCLUSION:  See above.           LOCATION:  [facility]                       Dictated by (CST): Andrea Lewis MD on 10/21/2024 at 8:49 AM        Finalized by (CST): Andrea Lewis MD on 10/21/2024 at 8:50 AM         XR CHEST AP PORTABLE  (CPT=71045)   Final Result   PROCEDURE:  XR CHEST AP PORTABLE  (CPT=71045)       TECHNIQUE:  AP chest radiograph was obtained.       COMPARISON:  [facility] , XR, XR CHEST AP PORTABLE  (CPT=71045), 10/19/2024,    11:07 PM.       INDICATIONS:  dyspnea       PATIENT STATED HISTORY: (As transcribed by Technologist)  Patient offered    no additional history at this time.             FINDINGS:  Stable cardiomegaly, changes of prior CABG and stable dual lead    cardiac pacemaker.  Normal pulmonary vasculature.  Mild atelectasis in the    left lung base noted.  Stable hyperaeration of the lungs.                         =====   CONCLUSION:     1. Mild subsegmental atelectasis within the left lung base noted.   2. Stable hyperaeration of the lungs suspicious for emphysema/COPD.   3. Stable cardiomegaly, stable changes of prior CABG and cardiac    pacemaker.           LOCATION:  [facility]                       Dictated by (CST): Dasha Rees DO on 10/20/2024 at 10:09 AM        Finalized by (CST): Dasha Rees DO on 10/20/2024 at 10:09 AM         XR CHEST AP PORTABLE  (CPT=71045)   Final Result   PROCEDURE:  XR CHEST AP PORTABLE  (CPT=71045)       TECHNIQUE:  AP chest radiograph was obtained.       COMPARISON:  [facility] , XR, XR CHEST AP PORTABLE  (CPT=71045), 11/14/2023,    0:18 AM.  [facility], XR, CHEST PA   LATERAL, 9/19/2008, 8:52 AM.       INDICATIONS:  ICD fired mulitple times       PATIENT STATED HISTORY: (As transcribed by Technologist)  Patient arrives    from home due to ICD going off multiple times. Patient offered no    additional history at this times.             FINDINGS:  Stable left-sided AICD. Cardiac size and pulmonary vasculature    are within normal limits. No pleural effusions. No  pneumothorax.  Median    sternotomy approximated by wire sutures.                             =====   CONCLUSION:  No acute pulmonary findings.           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      Hyperkalemia  -- sp lokelma, continue to redose x3 doses total   -- CK wnl  -- Hold lasix. sp gentle IVFs to promote kaliuresis. Now off. Initial CXR w no evidence of fluid overload. Repeat pending   -- Glucose control   -- albuterol per pulm   -- low K diet      MAIA on CKD3  -- suspect hypovolemic from increased home diuretics vs CRS  -- initially appeared on the dry side on exam to me. Lips dry. No edema. CXR without edema. No crackles on exam. +mild wheezing. Pulm suspects resp distress due to COPD exac and treating w meds/steroids/bipap.   -- gave 1L IVFs. Cr and K improved. Holding lasix. Restart PRN  -- ok to restart gentle diuresis prn  -- recommend holding lisinopril    -- ECHO w EF 15-20% severe diffuse hypokinesis w regional variations, G1DD  -- UA w no RBCs, 30 protein. Urine lytes noted after lasix  -- consider renal US w duplex  -- Strict UOP   -- avoid nephrotoxins and renally dose meds   -- may need angiogram this admit pending recovery per notes, if planning for this counseled re risks of DARLENE, would ideally like gentle IVFs pre and post if can tolerate     Anemia/thrombocytopenia  -- %sat 19, give IV iron if no infection, will start  Iron def could be contributing to his dyspnea     D/w Dr Rogers  D/w pt family     Thank you for allowing me to participate in the care of this patient. Please do not hesitate to call with questions or concerns.       Shantell Burger MD  [facility] Nephrology

## 2024-10-22 NOTE — PLAN OF CARE
Assumed care of pt at 1930. Pt is AOx4, following commands, and MONSIVAIS. Denies dizziness, nausea, or pain. Bipap as needed otherwise on nasal cannula.  Dyspneic with exertion, but states \"much better\" then previous days. POC discussed with pt. For complete assessment see E charting.  ROGELIO palm: CARDIOVASCULAR - ADULT  Goal: Maintains optimal cardiac output and hemodynamic stability  Description: INTERVENTIONS:  - Monitor vital signs, rhythm, and trends  - Monitor for bleeding, hypotension and signs of decreased cardiac output  - Evaluate effectiveness of vasoactive medications to optimize hemodynamic stability  - Monitor arterial and/or venous puncture sites for bleeding and/or hematoma  - Assess quality of pulses, skin color and temperature  - Assess for signs of decreased coronary artery perfusion - ex. Angina  - Evaluate fluid balance, assess for edema, trend weights  Outcome: Progressing  Goal: Absence of cardiac arrhythmias or at baseline  Description: INTERVENTIONS:  - Continuous cardiac monitoring, monitor vital signs, obtain 12 lead EKG if indicated  - Evaluate effectiveness of antiarrhythmic and heart rate control medications as ordered  - Initiate emergency measures for life threatening arrhythmias  - Monitor electrolytes and administer replacement therapy as ordered  Outcome: Progressing     Problem: RESPIRATORY - ADULT  Goal: Achieves optimal ventilation and oxygenation  Description: INTERVENTIONS:  - Assess for changes in respiratory status  - Assess for changes in mentation and behavior  - Position to facilitate oxygenation and minimize respiratory effort  - Oxygen supplementation based on oxygen saturation or ABGs  - Provide Smoking Cessation handout, if applicable  - Encourage broncho-pulmonary hygiene including cough, deep breathe, Incentive Spirometry  - Assess the need for suctioning and perform as needed  - Assess and instruct to report SOB or any respiratory difficulty  - Respiratory Therapy  support as indicated  - Manage/alleviate anxiety  - Monitor for signs/symptoms of CO2 retention  Outcome: Progressing

## 2024-10-22 NOTE — OCCUPATIONAL THERAPY NOTE
OCCUPATIONAL THERAPY EVALUATION - INPATIENT    Room Number: 6615/6615-A  Evaluation Date: 10/22/2024     Type of Evaluation: Initial  Presenting Problem: V tach, ICD firing    Physician Order: IP Consult to Occupational Therapy  Reason for Therapy:  ADL/IADL Dysfunction and Discharge Planning    OCCUPATIONAL THERAPY ASSESSMENT   Patient is a 79 year old male admitted on 10/19/2024 with Presenting Problem: V tach, ICD firing. Co-Morbidities : ischemic cardiomyopathy with an EF of 25%, history of VT status post AICD, chronic systolic heart failure, AAA status post EVAR, diabetes mellitus type 2, dyslipidemia, left renal artery stenosis  Patient is currently functioning near baseline with  all ADL .  Prior to admission, patient's baseline is independent.  Patient reports no further questions/concerns at this time.     EVALUATION SESSION:  Patient at start of session: seated    FUNCTIONAL TRANSFER ASSESSMENT  Sit to Stand: Chair  Chair: Modified Independent    BED MOBILITY  Supine to Sit : Not tested  Sit to Supine (OT): Not Tested    COGNITION  Overall Cognitive Status:  WFL - within functional limits    COGNITION ASSESSMENTS     Upper Extremity:   ROM: within functional limits   Strength: is within functional limits     EDUCATION PROVIDED  Patient Education : Role of Occupational Therapy; Plan of Care  Patient's Response to Education: Verbalized Understanding    Equipment used: rw  Demonstrates functional use    Therapist comments: pleasant, seated in the chair. Modified independent to stand and to ambulate in hallway. Pt reported that he has no ADL concerns.  Per PT, pt would benefit from RW use, refer to PT note.     Patient End of Session: Up in chair;Needs met;Call light within reach;All patient questions and concerns addressed    OCCUPATIONAL PROFILE    HOME SITUATION  Type of Home: House ([facility])  Home Layout: One level  Lives With: Spouse    Toilet and Equipment: Comfort height toilet  Shower/Tub and  Equipment: Walk-in shower             Drives: Yes  Patient Regularly Uses: Reading glasses;Rolling walker (wife has a RW from a knee injury but pt would be able to use if needed)    Prior Level of Function: lives at [facility] with his wife. Independent with ADL. Enjoys going to the fitness club.     PAIN ASSESSMENT  Ratin          OBJECTIVE  Precautions: Bed/chair alarm  Fall Risk: Standard fall risk    WEIGHT BEARING RESTRICTION       AM-PAC ‘6-Clicks’ Inpatient Daily Activity Short Form  -   Putting on and taking off regular lower body clothing?: None  -   Bathing (including washing, rinsing, drying)?: A Little  -   Toileting, which includes using toilet, bedpan or urinal? : None  -   Putting on and taking off regular upper body clothing?: None  -   Taking care of personal grooming such as brushing teeth?: None  -   Eating meals?: None    AM-PAC Score:  Score: 23  Approx Degree of Impairment: 15.86%  Standardized Score (AM-PAC Scale): 51.12    ADDITIONAL TESTS     NEUROLOGICAL FINDINGS      PLAN   Patient has been evaluated and presents with no skilled Occupational Therapy needs at this time.  Patient discharged from Occupational Therapy services.  Please re-order if a new functional limitation presents during this admission.         Patient Evaluation Complexity Level:   Occupational Profile/Medical History LOW - Brief history including review of medical or therapy records    Specific performance deficits impacting engagement in ADL/IADL LOW  1 - 3 performance deficits    Client Assessment/Performance Deficits MODERATE - Comorbidities and min to mod modifications of tasks    Clinical Decision Making LOW - Analysis of occupational profile, problem-focused assessments, limited treatment options    Overall Complexity LOW     OT Session Time: 20 minutes  Self-Care Home Management:  minutes  Therapeutic Activity: 6 minutes

## 2024-10-22 NOTE — PROGRESS NOTES
Cardiology Progress Note        Mingo White Patient Status:  Inpatient    1945 MRN [id number]   [facility] 6NE-A Attending Rehana Sanford*   Hosp Day # 2 PCP Daren Espino MD     Subjective:    He had a good night with regards to breathing.  Bipap at night, now on 5 L NC.  Tele with NSVT this morning. Asx.    Medications:   ipratropium-albuterol  3 mL Nebulization 6 times per day    amiodarone  200 mg Oral TID    heparin  5,000 Units Subcutaneous Q8H JENNIFFER    aspirin  81 mg Oral Nightly    atorvastatin  20 mg Oral Nightly    carvedilol  12.5 mg Oral BID    clopidogrel  75 mg Oral Daily    pantoprazole  40 mg Oral QAM AC    insulin aspart  2-10 Units Subcutaneous TID AC and HS    digoxin  125 mcg Oral Q MWF    methylPREDNISolone  80 mg Intravenous Q6H    budesonide  0.5 mg Nebulization 2 times daily    arformoterol  15 mcg Nebulization 2 times daily       Continuous Infusions:          Allergies:  Allergies[1]      Intake/Output:    Intake/Output Summary (Last 24 hours) at 10/22/2024 0738  Last data filed at 10/22/2024 0622  Gross per 24 hour   Intake 1421.98 ml   Output 800 ml   Net 621.98 ml           Last 3 Weights   10/20/24 1208 106 lb 11.2 oz (48.4 kg)   10/20/24 0130 106 lb 11.2 oz (48.4 kg)   10/19/24 2239 112 lb 7 oz (51 kg)   11/15/23 0532 113 lb 1.6 oz (51.3 kg)   23 0426 115 lb 4.8 oz (52.3 kg)   23 2346 122 lb (55.3 kg)   22 0852 124 lb (56.2 kg)            Physical Exam:    Temp:  [97.3 °F (36.3 °C)-98.4 °F (36.9 °C)] 98.4 °F (36.9 °C)  Pulse:  [60-80] 64  Resp:  [14-27] 17  BP: ()/() 125/55  SpO2:  [92 %-100 %] 100 %  FiO2 (%):  [30 %] 30 %    General: No apparent distress  HEENT: No focal deficits.  Neck: supple. JVP normal  Cardiac: Regular rhythm, S1, S2 normal,  rub or gallop.  No murmur.  Lungs: diminished  Abdomen: Soft, non-tender.   Extremities: No edema  Neurologic: no focal deficits  Skin: Warm and dry.     Telemetry:  reviewed    EKG:      Echo:      Cardiac Cath:      Labs:  HEM:  Recent Labs   Lab 10/19/24  2243 10/21/24  0424 10/22/24  0438   WBC 7.5 11.3* 12.0*   HGB 12.8* 11.6* 11.1*   .0* 109.0* 111.0*       Chem:  Recent Labs   Lab 10/20/24  0416 10/20/24  0851 10/20/24  1145 10/20/24  1456 10/21/24  0424 10/22/24  0438   * 135* 133*  --  131* 130*   K 5.7* 5.8* 6.1* 4.5 4.7 4.8    106 107  --  105 102   CO2 25.0 24.0 22.0  --  23.0 14.0*   BUN 50* 48* 41*  --  46* 43*   CREATSERUM 1.97* 2.06* 2.16*  --  1.94* 2.32*   CA 9.8 9.7 9.6  --  9.3 9.2   MG  --  2.0  --   --  1.8 2.0   PHOS  --  4.3 4.1  --   --  5.5*   * 237* 242*  --  189* 197*       Recent Labs   Lab 10/19/24  2243 10/20/24  1145 10/21/24  0424 10/22/24  0438   ALT 14  --  10  --    AST 15  --  10  --    ALB 4.6 4.3 3.7 3.5       Recent Labs   Lab 10/20/24  1145   CK 46       Recent Labs   Lab 10/19/24  2243   PTP 13.4   INR 1.01                 Impression:  VT/AICD - multiple shocks PTA, one in the ER.  [place] EP following, appreciate.  Device reprogrammed.  On IV amiodarone, now PO.  Still with NSVT.  Acute hypercapneic/hypoxic resp failure - long time smoker with COPD, quit 11/23.  On Bipap, nebs, and steroids per pulmonary.  CAD with ischemic cardiomyopathy, EF near 20%.  CABG 2006 in [place].  Endovascular AAA repair 2008.  Known left GRIFFIN  Acute on CRI - stable.  H/o hyperkalemia limiting aggressive GDMT.  Metabolic acidosis - Bicarb 14 this am???          Plan:  Repeat BMP this am.  CXR on 10/21 definitely suggests pulmonary edema, though agree likely drier side on admission.  Continue beta blocker, ASA, plavix.  PO amio and VT per EP.  Pending recovery, may proceed with angiogram.  See if we can get his CABG report.        Alexandru Rogers MD    Cr care 30 min.       [1]   Allergies  Allergen Reactions    Niaspan [Niacin, Antihyperlipidemic] ITCHING and FACE FLUSHING    Heparin UNKNOWN     States possible allergy to heparin.  Blood sugar elevated when he was given heparin

## 2024-10-23 LAB
ALBUMIN SERPL-MCNC: 3.7 G/DL (ref 3.2–4.8)
ANION GAP SERPL CALC-SCNC: 8 MMOL/L (ref 0–18)
BASOPHILS # BLD AUTO: 0.01 X10(3) UL (ref 0–0.2)
BASOPHILS NFR BLD AUTO: 0.1 %
BUN BLD-MCNC: 69 MG/DL (ref 9–23)
CALCIUM BLD-MCNC: 9.3 MG/DL (ref 8.7–10.4)
CHLORIDE SERPL-SCNC: 105 MMOL/L (ref 98–112)
CO2 SERPL-SCNC: 23 MMOL/L (ref 21–32)
CREAT BLD-MCNC: 2.16 MG/DL
EGFRCR SERPLBLD CKD-EPI 2021: 30 ML/MIN/1.73M2 (ref 60–?)
EOSINOPHIL # BLD AUTO: 0 X10(3) UL (ref 0–0.7)
EOSINOPHIL NFR BLD AUTO: 0 %
ERYTHROCYTE [DISTWIDTH] IN BLOOD BY AUTOMATED COUNT: 13.8 %
GLUCOSE BLD-MCNC: 168 MG/DL (ref 70–99)
GLUCOSE BLD-MCNC: 186 MG/DL (ref 70–99)
GLUCOSE BLD-MCNC: 200 MG/DL (ref 70–99)
GLUCOSE BLD-MCNC: 219 MG/DL (ref 70–99)
GLUCOSE BLD-MCNC: 237 MG/DL (ref 70–99)
HCT VFR BLD AUTO: 33.3 %
HGB BLD-MCNC: 11.2 G/DL
IMM GRANULOCYTES # BLD AUTO: 0.07 X10(3) UL (ref 0–1)
IMM GRANULOCYTES NFR BLD: 0.7 %
LYMPHOCYTES # BLD AUTO: 0.23 X10(3) UL (ref 1–4)
LYMPHOCYTES NFR BLD AUTO: 2.2 %
MAGNESIUM SERPL-MCNC: 2.4 MG/DL (ref 1.6–2.6)
MCH RBC QN AUTO: 29.7 PG (ref 26–34)
MCHC RBC AUTO-ENTMCNC: 33.6 G/DL (ref 31–37)
MCV RBC AUTO: 88.3 FL
MONOCYTES # BLD AUTO: 0.4 X10(3) UL (ref 0.1–1)
MONOCYTES NFR BLD AUTO: 3.9 %
NEUTROPHILS # BLD AUTO: 9.58 X10 (3) UL (ref 1.5–7.7)
NEUTROPHILS # BLD AUTO: 9.58 X10(3) UL (ref 1.5–7.7)
NEUTROPHILS NFR BLD AUTO: 93.1 %
OSMOLALITY SERPL CALC.SUM OF ELEC: 307 MOSM/KG (ref 275–295)
PHOSPHATE SERPL-MCNC: 5 MG/DL (ref 2.4–5.1)
PLATELET # BLD AUTO: 108 10(3)UL (ref 150–450)
POTASSIUM SERPL-SCNC: 4.1 MMOL/L (ref 3.5–5.1)
RBC # BLD AUTO: 3.77 X10(6)UL
SODIUM SERPL-SCNC: 136 MMOL/L (ref 136–145)
WBC # BLD AUTO: 10.3 X10(3) UL (ref 4–11)

## 2024-10-23 PROCEDURE — 80069 RENAL FUNCTION PANEL: CPT | Performed by: INTERNAL MEDICINE

## 2024-10-23 PROCEDURE — 94640 AIRWAY INHALATION TREATMENT: CPT

## 2024-10-23 PROCEDURE — 83735 ASSAY OF MAGNESIUM: CPT | Performed by: INTERNAL MEDICINE

## 2024-10-23 PROCEDURE — 82962 GLUCOSE BLOOD TEST: CPT

## 2024-10-23 PROCEDURE — 85025 COMPLETE CBC W/AUTO DIFF WBC: CPT | Performed by: STUDENT IN AN ORGANIZED HEALTH CARE EDUCATION/TRAINING PROGRAM

## 2024-10-23 RX ORDER — SODIUM CHLORIDE 9 MG/ML
INJECTION, SOLUTION INTRAVENOUS
Status: COMPLETED | OUTPATIENT
Start: 2024-10-24 | End: 2024-10-24

## 2024-10-23 RX ORDER — INSULIN DEGLUDEC 100 U/ML
10 INJECTION, SOLUTION SUBCUTANEOUS DAILY
Status: DISCONTINUED | OUTPATIENT
Start: 2024-10-23 | End: 2024-10-28

## 2024-10-23 NOTE — PROGRESS NOTES
[facility]  Hospitalist Progress Note                                                                     [facility]   part of [facility]  6/5/1945    SUBJECTIVE:  Seen and examined at bedside.  No overnight events.  Off oxygen.  Plan for cardiac cath tomorrow.  Denies any chest pain or shortness of breath.      OBJECTIVE:  Temp:  [97.4 °F (36.3 °C)-97.8 °F (36.6 °C)] 97.7 °F (36.5 °C)  Pulse:  [62-79] 65  Resp:  [14-26] 19  BP: (110-153)/() 129/48  SpO2:  [90 %-100 %] 95 %  Exam  Gen: No acute distress, alert and oriented x3, no focal neurologic deficits  Pulm: Clear to auscultation, off O2  CV: Heart with regular rate and rhythm, no murmur.  Normal PMI.    Abd: Abdomen soft, nontender, nondistended, no organomegaly, bowel sounds present  MSK: Full range of motion in extremities, no clubbing, no cyanosis  Skin: no rashes or lesions    Labs:   Recent Labs   Lab 10/19/24  2243 10/21/24  0424 10/22/24  0438 10/23/24  0509   WBC 7.5 11.3* 12.0* 10.3   HGB 12.8* 11.6* 11.1* 11.2*   MCV 87.3 87.9 89.9 88.3   .0* 109.0* 111.0* 108.0*   INR 1.01  --   --   --        Recent Labs   Lab 10/20/24  0851 10/20/24  1145 10/20/24  1456 10/21/24  0424 10/22/24  0438 10/22/24  0824 10/23/24  0509   * 133*  --  131* 130* 132* 136   K 5.8* 6.1* 4.5 4.7 4.8 4.4 4.1    107  --  105 102 102 105   CO2 24.0 22.0  --  23.0 14.0* 22.0 23.0   BUN 48* 41*  --  46* 43* 60* 69*   CREATSERUM 2.06* 2.16*  --  1.94* 2.32* 2.29* 2.16*   CA 9.7 9.6  --  9.3 9.2 9.4 9.3   MG 2.0  --   --  1.8 2.0  --  2.4   PHOS 4.3 4.1  --   --  5.5*  --  5.0   * 242*  --  189* 197* 207* 186*       Recent Labs   Lab 10/19/24  2243 10/20/24  1145 10/21/24  0424 10/22/24  0438 10/23/24  0509   ALT 14  --  10  --   --    AST 15  --  10  --   --    ALB 4.6 4.3 3.7 3.5 3.7       Recent Labs   Lab 10/22/24  1109 10/22/24  1621 10/22/24  6592  10/23/24  0625 10/23/24  1200   PGLU 219* 251* 251* 200* 219*       Meds:   Scheduled:    amiodarone  400 mg Oral TID    predniSONE  40 mg Oral Daily with breakfast    sodium ferric gluconate  125 mg Intravenous Daily    ipratropium-albuterol  3 mL Nebulization 4 times per day    heparin  5,000 Units Subcutaneous Q8H JENNIFFER    aspirin  81 mg Oral Nightly    atorvastatin  20 mg Oral Nightly    carvedilol  12.5 mg Oral BID    clopidogrel  75 mg Oral Daily    pantoprazole  40 mg Oral QAM AC    insulin aspart  2-10 Units Subcutaneous TID AC and HS    budesonide  0.5 mg Nebulization 2 times daily    arformoterol  15 mcg Nebulization 2 times daily     Continuous Infusions:       PRN:   acetaminophen **OR** HYDROcodone-acetaminophen **OR** HYDROcodone-acetaminophen    metoclopramide    albuterol    fentaNYL    Assessment/Plan:  Principal Problem:    Ventricular tachyarrhythmia (HCC)  Active Problems:    V-tach (HCC)    Ischemic cardiomyopathy with implantable cardioverter-defibrillator (ICD)    79 year old male with PMH sig for ischemic cardiomyopathy with an EF of 25%, history of VT status post AICD, chronic systolic heart failure, AAA status post EVAR, diabetes mellitus type 2, dyslipidemia, left renal artery stenosis presented after his ICD fired 4 times.      Ventricular tachycardia with appropriate shocks  S/p DCICD  - cont amio infusion, wean per cardiology  - coreg  - echo reviewed  - Evaluated by electrophysiologist, plan for upgrade to CRT pacemaker likely Friday  - further management per cardiology      Acute Hypercapnic Respiratory failure from acute COPD exacerbation-improving  - pulmonology following  -Off BiPAP,  -IV steroids tapered off to p.o.  - budesonide/brovana     ICMP   Chronic systolic heart failure  - cardiology following  - further diuresis per cardiology/nephrology   - daily weights, I/Os  - coreg  -2D echo with EF 15-20% with severe wall motion abnormality, see echo report for details,-this was  previously noted on echo  - maximize GDMT as bp permits  - Given ectopy and multiple other risk factors including smoking, ischemic evaluation is warranted.  Plan for cardiac catheterization tomorrow per cardiology    MAIA on CKD with hyperkalemia  - s/p lokelma x3 doses per nephrology   - hold lisinopril   - I/Os, UOP  - Creatinine improving  - nephrology following, case discussed     DM2 with hyperglycemia  - ISS/accucheks  - Worsened given steroids  - Add Tresiba 10 units daily     Hyperlipidemia  - statin     AAA s/p EVAR  - stable     FEN: regular diet, PT/OT  Proph: SCDs, heparin  Code status: Full code     Karina Rothman DO  [facility]  [facility]

## 2024-10-23 NOTE — PLAN OF CARE
Multiple unsuccessful attempts made to obtain cabg report from 2006 from 2 separate hospitals in [place]. Interestingly, pt wife has written notes from that time which she still has in her notebook. She has documented CABG x 2 (front and left side- I suspect this represents bypass to lad and lcx. Notes say that back of heart ok. Reviewed cardiac notes dating back to 2007 in epic however unable to find details regarding bypass targets.    Plan for C tomorrow. Again, suspect pt has lima to lad and svg to lcx based on pts hand written notes from that period.     Renae Raymundo NP  10/23/2024  2:40 PM  [phone]

## 2024-10-23 NOTE — PROGRESS NOTES
[facility]   part of [facility]    Nephrology Progress Note    Mingo White Attending:  Rehana Sanford*     Cc: christine, hyperkalemia    SUBJECTIVE     No complaints. States his breathing is better. On RA    PHYSICAL EXAM   Vital signs: BP (!) 148/132   Pulse 77   Temp 97.8 °F (36.6 °C) (Temporal)   Resp 26   Wt 136 lb 3.9 oz (61.8 kg)   SpO2 92%   BMI 21.99 kg/m²   Temp (24hrs), Av.6 °F (36.4 °C), Min:97.3 °F (36.3 °C), Max:97.8 °F (36.6 °C)       Intake/Output Summary (Last 24 hours) at 10/23/2024 1433  Last data filed at 10/23/2024 1423  Gross per 24 hour   Intake 1410 ml   Output 1725 ml   Net -315 ml     Wt Readings from Last 3 Encounters:   10/23/24 136 lb 3.9 oz (61.8 kg)   11/15/23 113 lb 1.6 oz (51.3 kg)   22 124 lb (56.2 kg)     General: NAD  HEENT: NCAT, EOMI, MMM  Neck: Supple   Cardiac: Regular rate and rhythm   Lungs: CTAB  Abdomen: Soft, non-tender, nondistended   Extremities: No edema  Neurologic: No asterxis  Skin: Warm and dry, no rashes     MEDS     Current Facility-Administered Medications   Medication Dose Route Frequency    amiodarone (Pacerone) tab 400 mg  400 mg Oral TID    predniSONE (Deltasone) tab 40 mg  40 mg Oral Daily with breakfast    sodium ferric gluconate (Ferrlecit) 125 mg in sodium chloride 0.9% 100mL IVPB premix  125 mg Intravenous Daily    ipratropium-albuterol (Duoneb) 0.5-2.5 (3) MG/3ML inhalation solution 3 mL  3 mL Nebulization 4 times per day    heparin (Porcine) 5000 UNIT/ML injection 5,000 Units  5,000 Units Subcutaneous Q8H JENNIFFER    acetaminophen (Tylenol) tab 650 mg  650 mg Oral Q4H PRN    Or    HYDROcodone-acetaminophen (Norco) 5-325 MG per tab 1 tablet  1 tablet Oral Q4H PRN    Or    HYDROcodone-acetaminophen (Norco) 5-325 MG per tab 2 tablet  2 tablet Oral Q4H PRN    metoclopramide (Reglan) 5 mg/mL injection 5 mg  5 mg Intravenous Q8H PRN    albuterol (Ventolin HFA) 108 (90 Base) MCG/ACT inhaler 2 puff  2 puff Inhalation Q4H PRN     aspirin DR tab 81 mg  81 mg Oral Nightly    atorvastatin (Lipitor) tab 20 mg  20 mg Oral Nightly    carvedilol (Coreg) tab 12.5 mg  12.5 mg Oral BID    clopidogrel (Plavix) tab 75 mg  75 mg Oral Daily    pantoprazole (Protonix) DR tab 40 mg  40 mg Oral QAM AC    insulin aspart (NovoLOG) 100 Units/mL FlexPen 2-10 Units  2-10 Units Subcutaneous TID AC and HS    budesonide (Pulmicort) 0.5 MG/2ML nebulizer suspension 0.5 mg  0.5 mg Nebulization 2 times daily    arformoterol (Brovana) 15 MCG/2ML nebulizer solution 15 mcg  15 mcg Nebulization 2 times daily    fentaNYL (Sublimaze) 50 mcg/mL injection 25 mcg  25 mcg Intravenous Q5 Min PRN       LABS     Lab Results   Component Value Date    WBC 10.3 10/23/2024    HGB 11.2 10/23/2024    HCT 33.3 10/23/2024    .0 10/23/2024    CREATSERUM 2.16 10/23/2024    BUN 69 10/23/2024     10/23/2024    K 4.1 10/23/2024     10/23/2024    CO2 23.0 10/23/2024     10/23/2024    CA 9.3 10/23/2024    ALB 3.7 10/23/2024    MG 2.4 10/23/2024    PHOS 5.0 10/23/2024    PGLU 219 10/23/2024       IMAGING   All imaging studies personally reviewed.    XR CHEST AP PORTABLE  (CPT=71045)   Final Result   PROCEDURE:  XR CHEST AP PORTABLE  (CPT=71045)       TECHNIQUE:  AP chest radiograph was obtained.       COMPARISON:  [facility] , XR, XR CHEST AP PORTABLE  (CPT=71045), 10/20/2024,    9:46 AM.       INDICATIONS:  hypoxic       PATIENT STATED HISTORY: (As transcribed by Technologist)  Patient offered    no additional history at this time.             FINDINGS:  Support devices appear overall stable.  Postsurgical changes of    chest are noted.  Heart size is within normal limits.  There is a question    of slight increased prominence of the pulmonary vasculature.  This may    reflect mild vascular congestion    and volume overload.  Mild basilar atelectasis is favored.  No new focal    consolidation or pleural effusion is seen.                         =====   CONCLUSION:  See above.            LOCATION:  [facility]                       Dictated by (CST): Andrea Lewis MD on 10/21/2024 at 8:49 AM        Finalized by (CST): Andrea Lewis MD on 10/21/2024 at 8:50 AM         XR CHEST AP PORTABLE  (CPT=71045)   Final Result   PROCEDURE:  XR CHEST AP PORTABLE  (CPT=71045)       TECHNIQUE:  AP chest radiograph was obtained.       COMPARISON:  [facility] , XR, XR CHEST AP PORTABLE  (CPT=71045), 10/19/2024,    11:07 PM.       INDICATIONS:  dyspnea       PATIENT STATED HISTORY: (As transcribed by Technologist)  Patient offered    no additional history at this time.             FINDINGS:  Stable cardiomegaly, changes of prior CABG and stable dual lead    cardiac pacemaker.  Normal pulmonary vasculature.  Mild atelectasis in the    left lung base noted.  Stable hyperaeration of the lungs.                         =====   CONCLUSION:     1. Mild subsegmental atelectasis within the left lung base noted.   2. Stable hyperaeration of the lungs suspicious for emphysema/COPD.   3. Stable cardiomegaly, stable changes of prior CABG and cardiac    pacemaker.           LOCATION:  [facility]                       Dictated by (CST): Dasha Rees DO on 10/20/2024 at 10:09 AM        Finalized by (CST): Dasha Rees DO on 10/20/2024 at 10:09 AM         XR CHEST AP PORTABLE  (CPT=71045)   Final Result   PROCEDURE:  XR CHEST AP PORTABLE  (CPT=71045)       TECHNIQUE:  AP chest radiograph was obtained.       COMPARISON:  [facility] , XR, XR CHEST AP PORTABLE  (CPT=71045), 11/14/2023,    0:18 AM.  [place], XR, CHEST PA   LATERAL, 9/19/2008, 8:52 AM.       INDICATIONS:  ICD fired mulitple times       PATIENT STATED HISTORY: (As transcribed by Technologist)  Patient arrives    from home due to ICD going off multiple times. Patient offered no    additional history at this times.             FINDINGS:  Stable left-sided AICD. Cardiac size and pulmonary vasculature    are within normal limits. No pleural effusions. No pneumothorax.  Median     sternotomy approximated by wire sutures.                             =====   CONCLUSION:  No acute pulmonary findings.           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      Hyperkalemia  -- sp lokelma, continue to redose x3 doses total   -- CK wnl  -- Hold lasix. sp gentle IVFs to promote kaliuresis. Now off. Initial CXR w no evidence of fluid overload. Repeat pending   -- Glucose control   -- albuterol per pulm   -- low K diet      MAIA on CKD3  -- suspect hypovolemic from increased home diuretics vs CRS  -- initially appeared on the dry side on exam to me. Lips dry. No edema. CXR without edema. No crackles on exam. +mild wheezing. Pulm suspects resp distress due to COPD exac and treating w meds/steroids/bipap.   -- gave 1L IVFs. Cr and K improved. Holding lasix. Restart PRN  -- ok to restart gentle diuresis prn  -- recommend holding lisinopril    -- ECHO w EF 15-20% severe diffuse hypokinesis w regional variations, G1DD  -- UA w no RBCs, 30 protein. Urine lytes noted after lasix  -- consider renal US w duplex  -- Strict UOP   -- avoid nephrotoxins and renally dose meds   -- planning for angiogram tomorrow per cards, counseled re risks of DARLENE, would ideally like gentle IVFs pre and post if ok w cards 75cc/hr for 6-8hrs pre and post, or less if would prefer from cards     Anemia/thrombocytopenia  -- %sat 19, give IV iron if no infection, will start  Iron def could be contributing to his dyspnea     D/w RN    Thank you for allowing me to participate in the care of this patient. Please do not hesitate to call with questions or concerns.       Shantell Burger MD  [facility] Nephrology

## 2024-10-23 NOTE — PROGRESS NOTES
Patient alert and oriented x 4. Up with x1 assist using walker. On RA. A/V paced on tele. Continent of bowel and bladder. No complaints of pain, shortness of breath, or chest pain/discomfort. Skin check completed with BALJEET Jolly per protocol. See flowsheet. POC discussed with patient. Fall precautions in place. Call light within reach.

## 2024-10-23 NOTE — PROGRESS NOTES
[facility]    Mingo White Patient Status:  Inpatient    1945 MRN [id number]   Location [facility] 6NE-A Attending Rehana Sanford*   Hosp Day # 3 PCP Daren Espino MD     Critical Care Progress Note     Date of Admission: 10/19/2024 10:37 PM  Admission Diagnosis: Ventricular tachyarrhythmia (HCC) [I47.20]  Ischemic cardiomyopathy with implantable cardioverter-defibrillator (ICD) [I25.5, Z95.810]     S:  Dyspnea is improved. No new complaints.        Current Medications:    Current Facility-Administered Medications:     amiodarone (Pacerone) tab 400 mg, 400 mg, Oral, TID    predniSONE (Deltasone) tab 40 mg, 40 mg, Oral, Daily with breakfast    sodium ferric gluconate (Ferrlecit) 125 mg in sodium chloride 0.9% 100mL IVPB premix, 125 mg, Intravenous, Daily    ipratropium-albuterol (Duoneb) 0.5-2.5 (3) MG/3ML inhalation solution 3 mL, 3 mL, Nebulization, 4 times per day    heparin (Porcine) 5000 UNIT/ML injection 5,000 Units, 5,000 Units, Subcutaneous, Q8H JENNIFFER    acetaminophen (Tylenol) tab 650 mg, 650 mg, Oral, Q4H PRN **OR** HYDROcodone-acetaminophen (Norco) 5-325 MG per tab 1 tablet, 1 tablet, Oral, Q4H PRN **OR** HYDROcodone-acetaminophen (Norco) 5-325 MG per tab 2 tablet, 2 tablet, Oral, Q4H PRN    metoclopramide (Reglan) 5 mg/mL injection 5 mg, 5 mg, Intravenous, Q8H PRN    albuterol (Ventolin HFA) 108 (90 Base) MCG/ACT inhaler 2 puff, 2 puff, Inhalation, Q4H PRN    aspirin DR tab 81 mg, 81 mg, Oral, Nightly    atorvastatin (Lipitor) tab 20 mg, 20 mg, Oral, Nightly    carvedilol (Coreg) tab 12.5 mg, 12.5 mg, Oral, BID    clopidogrel (Plavix) tab 75 mg, 75 mg, Oral, Daily    pantoprazole (Protonix) DR tab 40 mg, 40 mg, Oral, QAM AC    insulin aspart (NovoLOG) 100 Units/mL FlexPen 2-10 Units, 2-10 Units, Subcutaneous, TID AC and HS    budesonide (Pulmicort) 0.5 MG/2ML nebulizer suspension 0.5 mg, 0.5 mg, Nebulization, 2 times daily    arformoterol (Brovana) 15 MCG/2ML nebulizer solution 15  mcg, 15 mcg, Nebulization, 2 times daily    fentaNYL (Sublimaze) 50 mcg/mL injection 25 mcg, 25 mcg, Intravenous, Q5 Min PRN     OBJECTIVE:  /69   Pulse 69   Temp 97.7 °F (36.5 °C) (Temporal)   Resp 18   Wt 136 lb 3.9 oz (61.8 kg)   SpO2 94%   BMI 21.99 kg/m²          Wt Readings from Last 3 Encounters:   10/23/24 136 lb 3.9 oz (61.8 kg)   11/15/23 113 lb 1.6 oz (51.3 kg)   03/21/22 124 lb (56.2 kg)        I/O last 3 completed shifts:  In: 1250 [P.O.:1150; IV PIGGYBACK:100]  Out: 2375 [Urine:2375]  I/O this shift:  In: -   Out: 225 [Urine:225]      Physical Exam:                          General: alert, cooperative, in NAD                          HEENT: oropharynx clear without erythema or exudates, moist mucous membranes                          Lungs: CTA bilaterally                          Chest wall: No tenderness or deformity.                          Heart: Regular rate and rhythm, normal S1S2                          Abdomen: soft, non-tender, non-distended, positive BS.                          Extremity: No clubbing or cyanosis. no edema                          Skin: No rashes or lesions.       Lab Results   Component Value Date    WBC 10.3 10/23/2024    RBC 3.77 10/23/2024    HGB 11.2 10/23/2024    HCT 33.3 10/23/2024    MCV 88.3 10/23/2024    MCH 29.7 10/23/2024    MCHC 33.6 10/23/2024    RDW 13.8 10/23/2024    .0 10/23/2024     Lab Results   Component Value Date     10/23/2024    K 4.1 10/23/2024     10/23/2024    CO2 23.0 10/23/2024    BUN 69 10/23/2024    CREATSERUM 2.16 10/23/2024     10/23/2024    CA 9.3 10/23/2024    ALB 3.7 10/23/2024     Lab Results   Component Value Date    INR 1.01 10/19/2024    INR 1.0 (L) 08/08/2008    INR 1.0 (L) 12/11/2007           Imaging: I have independently visualized all relevant chest imaging in PACS.  I agree with the radiology interpretation except where noted.       ASSESSMENT/PLAN:  Acute Hypercapnic resp failure: 2/2  AECOPD  -weaned off bipap, now on RA  -RVP negtive  -CXR with mild pulmonary vascular congestion without focal infiltrate  -repeat ABG reviewed, improvement in hypercapnea  COPD Exacerbation  -IV to PO steroids today  -budesonide/brovana  -BD protocol  CHF w/ mult AICD firings  -echo with EF: 15-20% severe diffuse hypokinesis with regional variations, G1DD  -amiodarone  -Cards following; plan for angiogram tomorrow  -monitor electrolytes  MAIA on CKD  -renal following  FEN:  -ADAT   Proph:  -hep sub Q  Dispo:  -stable to transfer to the floor from pulm perspective  -will follow

## 2024-10-23 NOTE — PLAN OF CARE
Received patient around 0730.  AV paced on monitors. 2 runs of VTACH noted-8 beats. Denies pain.  Remains on RA, denies SOB.  Up in chair, ambulating halls with walker.  Plan for cath tomorrow.  Transfer orders in place.  Family at bedside.

## 2024-10-23 NOTE — PROGRESS NOTES
Cardiology Progress Note        Mingo White Patient Status:  Inpatient    1945 MRN [id number]   [facility] 6NE-A Attending Rehana Sanford*   Hosp Day # 3 PCP Daren Espino MD     Subjective:  Resp much improved, now on RA.  BP stable.  Good UO without diuretics.  Tele with NSVT this morning. Asx.    Medications:   amiodarone  400 mg Oral TID    predniSONE  40 mg Oral Daily with breakfast    sodium ferric gluconate  125 mg Intravenous Daily    ipratropium-albuterol  3 mL Nebulization 4 times per day    heparin  5,000 Units Subcutaneous Q8H JENNIFFER    aspirin  81 mg Oral Nightly    atorvastatin  20 mg Oral Nightly    carvedilol  12.5 mg Oral BID    clopidogrel  75 mg Oral Daily    pantoprazole  40 mg Oral QAM AC    insulin aspart  2-10 Units Subcutaneous TID AC and HS    budesonide  0.5 mg Nebulization 2 times daily    arformoterol  15 mcg Nebulization 2 times daily       Continuous Infusions:          Allergies:  Allergies[1]      Intake/Output:    Intake/Output Summary (Last 24 hours) at 10/23/2024 0740  Last data filed at 10/23/2024 0630  Gross per 24 hour   Intake 1010 ml   Output 1700 ml   Net -690 ml           Last 3 Weights   10/23/24 0500 136 lb 3.9 oz (61.8 kg)   10/20/24 1208 106 lb 11.2 oz (48.4 kg)   10/20/24 0130 106 lb 11.2 oz (48.4 kg)   10/19/24 2239 112 lb 7 oz (51 kg)   11/15/23 0532 113 lb 1.6 oz (51.3 kg)   23 0426 115 lb 4.8 oz (52.3 kg)   23 2346 122 lb (55.3 kg)   22 0852 124 lb (56.2 kg)            Physical Exam:    Temp:  [97 °F (36.1 °C)-97.6 °F (36.4 °C)] 97.4 °F (36.3 °C)  Pulse:  [62-79] 64  Resp:  [14-26] 15  BP: ()/() 128/53  SpO2:  [90 %-100 %] 92 %    General: No apparent distress  HEENT: No focal deficits.  Neck: supple. JVP normal  Cardiac: Regular rhythm, S1, S2 normal,  rub or gallop.  No murmur.  Lungs: diminished  Abdomen: Soft, non-tender.   Extremities: No edema  Neurologic: no focal deficits  Skin: Warm and dry.      Telemetry: reviewed    EKG:      Echo:      Cardiac Cath:      Labs:  HEM:  Recent Labs   Lab 10/19/24  2243 10/21/24  0424 10/22/24  0438 10/23/24  0509   WBC 7.5 11.3* 12.0* 10.3   HGB 12.8* 11.6* 11.1* 11.2*   .0* 109.0* 111.0* 108.0*       Chem:  Recent Labs   Lab 10/20/24  0851 10/20/24  1145 10/20/24  1456 10/21/24  0424 10/22/24  0438 10/22/24  0824 10/23/24  0509   * 133*  --  131* 130* 132* 136   K 5.8* 6.1* 4.5 4.7 4.8 4.4 4.1    107  --  105 102 102 105   CO2 24.0 22.0  --  23.0 14.0* 22.0 23.0   BUN 48* 41*  --  46* 43* 60* 69*   CREATSERUM 2.06* 2.16*  --  1.94* 2.32* 2.29* 2.16*   CA 9.7 9.6  --  9.3 9.2 9.4 9.3   MG 2.0  --   --  1.8 2.0  --  2.4   PHOS 4.3 4.1  --   --  5.5*  --  5.0   * 242*  --  189* 197* 207* 186*       Recent Labs   Lab 10/19/24  2243 10/20/24  1145 10/21/24  0424 10/22/24  0438 10/23/24  0509   ALT 14  --  10  --   --    AST 15  --  10  --   --    ALB 4.6 4.3 3.7 3.5 3.7       Recent Labs   Lab 10/20/24  1145   CK 46       Recent Labs   Lab 10/19/24  2243   PTP 13.4   INR 1.01                 Impression:  VT/AICD - multiple shocks PTA, one in the ER.  [place] EP following, appreciate.  Device reprogrammed.  On IV amiodarone, now PO.  Still with NSVT.  Acute hypercapneic/hypoxic resp failure - long time smoker with COPD, quit 11/23.  Off Bipap now, nebs, and steroids per pulmonary.  CAD with ischemic cardiomyopathy, EF near 20%.  CABG 2006 in [place].  Endovascular AAA repair 2008.  Known left GRIFFIN  Acute on CRI - stable.  H/o hyperkalemia limiting aggressive GDMT.            Plan:  With all the VT he has been having recently along with new problems with heart failure, coronary angiography is indicated.  Hopefully something to fix that explains his recent issues.  Plan on cath tomorrow.  Per EP, upgrade to CRT-D on Friday.  Continue beta blocker, ASA, plavix.  PO amio and VT per EP.    See if we can get his CABG report.        Alexandru Rogers,  MD  L3       [1]   Allergies  Allergen Reactions    Niaspan [Niacin, Antihyperlipidemic] ITCHING and FACE FLUSHING    Heparin UNKNOWN     States possible allergy to heparin. Blood sugar elevated when he was given heparin

## 2024-10-24 ENCOUNTER — APPOINTMENT (OUTPATIENT)
Dept: INTERVENTIONAL RADIOLOGY/VASCULAR | Facility: HOSPITAL | Age: 79
End: 2024-10-24
Attending: NURSE PRACTITIONER
Payer: MEDICARE

## 2024-10-24 LAB
ALBUMIN SERPL-MCNC: 3.6 G/DL (ref 3.2–4.8)
ANION GAP SERPL CALC-SCNC: 7 MMOL/L (ref 0–18)
BASOPHILS # BLD AUTO: 0.01 X10(3) UL (ref 0–0.2)
BASOPHILS NFR BLD AUTO: 0.1 %
BUN BLD-MCNC: 70 MG/DL (ref 9–23)
CALCIUM BLD-MCNC: 9.3 MG/DL (ref 8.7–10.4)
CHLORIDE SERPL-SCNC: 107 MMOL/L (ref 98–112)
CO2 SERPL-SCNC: 23 MMOL/L (ref 21–32)
CREAT BLD-MCNC: 2.17 MG/DL
EGFRCR SERPLBLD CKD-EPI 2021: 30 ML/MIN/1.73M2 (ref 60–?)
EOSINOPHIL # BLD AUTO: 0 X10(3) UL (ref 0–0.7)
EOSINOPHIL NFR BLD AUTO: 0 %
ERYTHROCYTE [DISTWIDTH] IN BLOOD BY AUTOMATED COUNT: 14 %
GLUCOSE BLD-MCNC: 150 MG/DL (ref 70–99)
GLUCOSE BLD-MCNC: 156 MG/DL (ref 70–99)
GLUCOSE BLD-MCNC: 177 MG/DL (ref 70–99)
GLUCOSE BLD-MCNC: 185 MG/DL (ref 70–99)
GLUCOSE BLD-MCNC: 193 MG/DL (ref 70–99)
HCT VFR BLD AUTO: 32.4 %
HGB BLD-MCNC: 10.9 G/DL
IMM GRANULOCYTES # BLD AUTO: 0.09 X10(3) UL (ref 0–1)
IMM GRANULOCYTES NFR BLD: 0.8 %
ISTAT ACTIVATED CLOTTING TIME: 177 SECONDS (ref 74–137)
ISTAT ACTIVATED CLOTTING TIME: 183 SECONDS (ref 74–137)
ISTAT ACTIVATED CLOTTING TIME: 201 SECONDS (ref 74–137)
ISTAT ACTIVATED CLOTTING TIME: 220 SECONDS (ref 74–137)
ISTAT ACTIVATED CLOTTING TIME: 281 SECONDS (ref 74–137)
LYMPHOCYTES # BLD AUTO: 0.3 X10(3) UL (ref 1–4)
LYMPHOCYTES NFR BLD AUTO: 2.8 %
MAGNESIUM SERPL-MCNC: 2.4 MG/DL (ref 1.6–2.6)
MCH RBC QN AUTO: 29.5 PG (ref 26–34)
MCHC RBC AUTO-ENTMCNC: 33.6 G/DL (ref 31–37)
MCV RBC AUTO: 87.6 FL
MONOCYTES # BLD AUTO: 0.47 X10(3) UL (ref 0.1–1)
MONOCYTES NFR BLD AUTO: 4.3 %
NEUTROPHILS # BLD AUTO: 10 X10 (3) UL (ref 1.5–7.7)
NEUTROPHILS # BLD AUTO: 10 X10(3) UL (ref 1.5–7.7)
NEUTROPHILS NFR BLD AUTO: 92 %
OSMOLALITY SERPL CALC.SUM OF ELEC: 309 MOSM/KG (ref 275–295)
PHOSPHATE SERPL-MCNC: 4.9 MG/DL (ref 2.4–5.1)
PLATELET # BLD AUTO: 111 10(3)UL (ref 150–450)
POTASSIUM SERPL-SCNC: 4 MMOL/L (ref 3.5–5.1)
RBC # BLD AUTO: 3.7 X10(6)UL
SODIUM SERPL-SCNC: 137 MMOL/L (ref 136–145)
WBC # BLD AUTO: 10.9 X10(3) UL (ref 4–11)

## 2024-10-24 PROCEDURE — 80069 RENAL FUNCTION PANEL: CPT | Performed by: INTERNAL MEDICINE

## 2024-10-24 PROCEDURE — 99152 MOD SED SAME PHYS/QHP 5/>YRS: CPT | Performed by: INTERNAL MEDICINE

## 2024-10-24 PROCEDURE — 36005 INJECTION EXT VENOGRAPHY: CPT | Performed by: INTERNAL MEDICINE

## 2024-10-24 PROCEDURE — 94640 AIRWAY INHALATION TREATMENT: CPT

## 2024-10-24 PROCEDURE — 82962 GLUCOSE BLOOD TEST: CPT

## 2024-10-24 PROCEDURE — B212YZZ FLUOROSCOPY OF SINGLE CORONARY ARTERY BYPASS GRAFT USING OTHER CONTRAST: ICD-10-PCS | Performed by: INTERNAL MEDICINE

## 2024-10-24 PROCEDURE — 85347 COAGULATION TIME ACTIVATED: CPT

## 2024-10-24 PROCEDURE — 027034Z DILATION OF CORONARY ARTERY, ONE ARTERY WITH DRUG-ELUTING INTRALUMINAL DEVICE, PERCUTANEOUS APPROACH: ICD-10-PCS | Performed by: INTERNAL MEDICINE

## 2024-10-24 PROCEDURE — 83735 ASSAY OF MAGNESIUM: CPT | Performed by: INTERNAL MEDICINE

## 2024-10-24 PROCEDURE — 75820 VEIN X-RAY ARM/LEG: CPT | Performed by: INTERNAL MEDICINE

## 2024-10-24 PROCEDURE — B211YZZ FLUOROSCOPY OF MULTIPLE CORONARY ARTERIES USING OTHER CONTRAST: ICD-10-PCS | Performed by: INTERNAL MEDICINE

## 2024-10-24 PROCEDURE — B241ZZ3 ULTRASONOGRAPHY OF MULTIPLE CORONARY ARTERIES, INTRAVASCULAR: ICD-10-PCS | Performed by: INTERNAL MEDICINE

## 2024-10-24 PROCEDURE — B215YZZ FLUOROSCOPY OF LEFT HEART USING OTHER CONTRAST: ICD-10-PCS | Performed by: INTERNAL MEDICINE

## 2024-10-24 PROCEDURE — 4A023N7 MEASUREMENT OF CARDIAC SAMPLING AND PRESSURE, LEFT HEART, PERCUTANEOUS APPROACH: ICD-10-PCS | Performed by: INTERNAL MEDICINE

## 2024-10-24 PROCEDURE — 93010 ELECTROCARDIOGRAM REPORT: CPT | Performed by: INTERNAL MEDICINE

## 2024-10-24 PROCEDURE — 93005 ELECTROCARDIOGRAM TRACING: CPT

## 2024-10-24 PROCEDURE — 85025 COMPLETE CBC W/AUTO DIFF WBC: CPT | Performed by: STUDENT IN AN ORGANIZED HEALTH CARE EDUCATION/TRAINING PROGRAM

## 2024-10-24 PROCEDURE — 93459 L HRT ART/GRFT ANGIO: CPT | Performed by: INTERNAL MEDICINE

## 2024-10-24 PROCEDURE — B218YZZ FLUOROSCOPY OF LEFT INTERNAL MAMMARY BYPASS GRAFT USING OTHER CONTRAST: ICD-10-PCS | Performed by: INTERNAL MEDICINE

## 2024-10-24 PROCEDURE — 92978 ENDOLUMINL IVUS OCT C 1ST: CPT | Performed by: INTERNAL MEDICINE

## 2024-10-24 PROCEDURE — B51NYZZ FLUOROSCOPY OF LEFT UPPER EXTREMITY VEINS USING OTHER CONTRAST: ICD-10-PCS | Performed by: INTERNAL MEDICINE

## 2024-10-24 PROCEDURE — 99153 MOD SED SAME PHYS/QHP EA: CPT | Performed by: INTERNAL MEDICINE

## 2024-10-24 RX ORDER — ATORVASTATIN CALCIUM 40 MG/1
40 TABLET, FILM COATED ORAL NIGHTLY
Status: DISCONTINUED | OUTPATIENT
Start: 2024-10-24 | End: 2024-10-28

## 2024-10-24 RX ORDER — HEPARIN SODIUM 5000 [USP'U]/ML
INJECTION, SOLUTION INTRAVENOUS; SUBCUTANEOUS
Status: COMPLETED
Start: 2024-10-24 | End: 2024-10-24

## 2024-10-24 RX ORDER — MIDAZOLAM HYDROCHLORIDE 1 MG/ML
INJECTION INTRAMUSCULAR; INTRAVENOUS
Status: COMPLETED
Start: 2024-10-24 | End: 2024-10-24

## 2024-10-24 RX ORDER — LIDOCAINE HYDROCHLORIDE 10 MG/ML
INJECTION, SOLUTION EPIDURAL; INFILTRATION; INTRACAUDAL; PERINEURAL
Status: COMPLETED
Start: 2024-10-24 | End: 2024-10-24

## 2024-10-24 RX ORDER — NITROGLYCERIN 20 MG/100ML
INJECTION INTRAVENOUS
Status: COMPLETED
Start: 2024-10-24 | End: 2024-10-24

## 2024-10-24 RX ORDER — SODIUM CHLORIDE 9 MG/ML
INJECTION, SOLUTION INTRAVENOUS CONTINUOUS
Status: DISCONTINUED | OUTPATIENT
Start: 2024-10-24 | End: 2024-10-25

## 2024-10-24 RX ORDER — IODIXANOL 320 MG/ML
130 INJECTION, SOLUTION INTRAVASCULAR
Status: COMPLETED | OUTPATIENT
Start: 2024-10-24 | End: 2024-10-24

## 2024-10-24 NOTE — PROGRESS NOTES
[facility]   part of [facility]    Nephrology Progress Note    Mingo White Attending:  Rehana Sanford*     Cc: christine, hyperkalemia    SUBJECTIVE     No complaints. States his breathing is better. On RA    PHYSICAL EXAM   Vital signs: /64 (BP Location: Left arm)   Pulse 60   Temp 97.7 °F (36.5 °C) (Oral)   Resp 18   Wt 136 lb 3.9 oz (61.8 kg)   SpO2 93%   BMI 21.99 kg/m²   Temp (24hrs), Av.9 °F (36.6 °C), Min:97.5 °F (36.4 °C), Max:98.6 °F (37 °C)       Intake/Output Summary (Last 24 hours) at 10/24/2024 1626  Last data filed at 10/24/2024 1351  Gross per 24 hour   Intake 360 ml   Output 1450 ml   Net -1090 ml     Wt Readings from Last 3 Encounters:   10/23/24 136 lb 3.9 oz (61.8 kg)   11/15/23 113 lb 1.6 oz (51.3 kg)   22 124 lb (56.2 kg)     General: NAD  HEENT: NCAT, EOMI, MMM  Neck: Supple   Cardiac: Regular rate and rhythm   Lungs: CTAB  Abdomen: Soft, non-tender, nondistended   Extremities: No edema  Neurologic: No asterxis  Skin: Warm and dry, no rashes     MEDS     Current Facility-Administered Medications   Medication Dose Route Frequency    atorvastatin (Lipitor) tab 40 mg  40 mg Oral Nightly    sodium chloride 0.9% infusion   Intravenous Continuous    insulin degludec (Tresiba) 100 units/mL flextouch 10 Units  10 Units Subcutaneous Daily    amiodarone (Pacerone) tab 400 mg  400 mg Oral TID    predniSONE (Deltasone) tab 40 mg  40 mg Oral Daily with breakfast    sodium ferric gluconate (Ferrlecit) 125 mg in sodium chloride 0.9% 100mL IVPB premix  125 mg Intravenous Daily    ipratropium-albuterol (Duoneb) 0.5-2.5 (3) MG/3ML inhalation solution 3 mL  3 mL Nebulization 4 times per day    heparin (Porcine) 5000 UNIT/ML injection 5,000 Units  5,000 Units Subcutaneous Q8H JENNIFFER    acetaminophen (Tylenol) tab 650 mg  650 mg Oral Q4H PRN    Or    HYDROcodone-acetaminophen (Norco) 5-325 MG per tab 1 tablet  1 tablet Oral Q4H PRN    Or    HYDROcodone-acetaminophen (Norco) 5-325  MG per tab 2 tablet  2 tablet Oral Q4H PRN    metoclopramide (Reglan) 5 mg/mL injection 5 mg  5 mg Intravenous Q8H PRN    albuterol (Ventolin HFA) 108 (90 Base) MCG/ACT inhaler 2 puff  2 puff Inhalation Q4H PRN    aspirin DR tab 81 mg  81 mg Oral Nightly    carvedilol (Coreg) tab 12.5 mg  12.5 mg Oral BID    clopidogrel (Plavix) tab 75 mg  75 mg Oral Daily    pantoprazole (Protonix) DR tab 40 mg  40 mg Oral QAM AC    insulin aspart (NovoLOG) 100 Units/mL FlexPen 2-10 Units  2-10 Units Subcutaneous TID AC and HS    budesonide (Pulmicort) 0.5 MG/2ML nebulizer suspension 0.5 mg  0.5 mg Nebulization 2 times daily    arformoterol (Brovana) 15 MCG/2ML nebulizer solution 15 mcg  15 mcg Nebulization 2 times daily    fentaNYL (Sublimaze) 50 mcg/mL injection 25 mcg  25 mcg Intravenous Q5 Min PRN       LABS     Lab Results   Component Value Date    WBC 10.9 10/24/2024    HGB 10.9 10/24/2024    HCT 32.4 10/24/2024    .0 10/24/2024    CREATSERUM 2.17 10/24/2024    BUN 70 10/24/2024     10/24/2024    K 4.0 10/24/2024     10/24/2024    CO2 23.0 10/24/2024     10/24/2024    CA 9.3 10/24/2024    ALB 3.6 10/24/2024    MG 2.4 10/24/2024    PHOS 4.9 10/24/2024    PGLU 150 10/24/2024       IMAGING   All imaging studies personally reviewed.    XR CHEST AP PORTABLE  (CPT=71045)   Final Result   PROCEDURE:  XR CHEST AP PORTABLE  (CPT=71045)       TECHNIQUE:  AP chest radiograph was obtained.       COMPARISON:  [facility] , XR, XR CHEST AP PORTABLE  (CPT=71045), 10/20/2024,    9:46 AM.       INDICATIONS:  hypoxic       PATIENT STATED HISTORY: (As transcribed by Technologist)  Patient offered    no additional history at this time.             FINDINGS:  Support devices appear overall stable.  Postsurgical changes of    chest are noted.  Heart size is within normal limits.  There is a question    of slight increased prominence of the pulmonary vasculature.  This may    reflect mild vascular congestion    and volume  overload.  Mild basilar atelectasis is favored.  No new focal    consolidation or pleural effusion is seen.                         =====   CONCLUSION:  See above.           LOCATION:  [facility]                       Dictated by (CST): Andrea Lewis MD on 10/21/2024 at 8:49 AM        Finalized by (CST): Andrea Lewis MD on 10/21/2024 at 8:50 AM         XR CHEST AP PORTABLE  (CPT=71045)   Final Result   PROCEDURE:  XR CHEST AP PORTABLE  (CPT=71045)       TECHNIQUE:  AP chest radiograph was obtained.       COMPARISON:  [facility] , XR, XR CHEST AP PORTABLE  (CPT=71045), 10/19/2024,    11:07 PM.       INDICATIONS:  dyspnea       PATIENT STATED HISTORY: (As transcribed by Technologist)  Patient offered    no additional history at this time.             FINDINGS:  Stable cardiomegaly, changes of prior CABG and stable dual lead    cardiac pacemaker.  Normal pulmonary vasculature.  Mild atelectasis in the    left lung base noted.  Stable hyperaeration of the lungs.                         =====   CONCLUSION:     1. Mild subsegmental atelectasis within the left lung base noted.   2. Stable hyperaeration of the lungs suspicious for emphysema/COPD.   3. Stable cardiomegaly, stable changes of prior CABG and cardiac    pacemaker.           LOCATION:  [facility]                       Dictated by (CST): Dasha Rees DO on 10/20/2024 at 10:09 AM        Finalized by (CST): Dasha Rees DO on 10/20/2024 at 10:09 AM         XR CHEST AP PORTABLE  (CPT=71045)   Final Result   PROCEDURE:  XR CHEST AP PORTABLE  (CPT=71045)       TECHNIQUE:  AP chest radiograph was obtained.       COMPARISON:  [facility] , XR, XR CHEST AP PORTABLE  (CPT=71045), 11/14/2023,    0:18 AM.  [facility], XR, CHEST PA   LATERAL, 9/19/2008, 8:52 AM.       INDICATIONS:  ICD fired mulitple times       PATIENT STATED HISTORY: (As transcribed by Technologist)  Patient arrives    from home due to ICD going off multiple times. Patient offered no    additional history at this times.              FINDINGS:  Stable left-sided AICD. Cardiac size and pulmonary vasculature    are within normal limits. No pleural effusions. No pneumothorax.  Median    sternotomy approximated by wire sutures.                             =====   CONCLUSION:  No acute pulmonary findings.           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      Hyperkalemia  -- sp lokelma, continue to redose x3 doses total   -- Heold lasix. sp gentle IVFs to promote kaliuresis. Now off. Initial CXR w no evidence of fluid overload. Repeat w mild fluid.   -- Glucose control   -- albuterol per pulm   -- low K diet      MAIA on CKD3  -- suspect hypovolemic from increased home diuretics vs CRS  -- initially appeared on the dry side on exam to me. Lips dry. No edema. CXR without edema. No crackles on exam. +mild wheezing. Pulm suspects resp distress due to COPD exac and treating w meds/steroids/bipap.   -- gave 1L IVFs. Cr and K improved. Holding lasix. Restart PRN  -- ok to restart gentle diuresis prn  -- recommend holding lisinopril    -- ECHO w EF 15-20% severe diffuse hypokinesis w regional variations, G1DD  -- UA w no RBCs, 30 protein. Urine lytes noted after lasix  -- consider renal US w duplex  -- Strict UOP   -- avoid nephrotoxins and renally dose meds   -- plan for angiogram today per cards, counseled re risks of DARLENE, gentle IVFs pre and post if ok w cards 100cc/hr for 6hrs pre and post, or less if would prefer from cards     Anemia/thrombocytopenia  -- %sat 19, give IV iron if no infection, will start  Iron def could be contributing to his dyspnea     D/w Dr Rothman    Thank you for allowing me to participate in the care of this patient. Please do  not hesitate to call with questions or concerns.       Shantell Burger MD  [facility] Nephrology

## 2024-10-24 NOTE — PLAN OF CARE
Assumed care of pt at 0730. A/ox4, rm air, SR/AV paced on tele. When patient is SR, first degree block & BBB are present. No reports of pain. Breath sounds clear/diminished upon auscultation. Reports non-productive, congested cough following breathing treatment this morning. Impaired vision - wears glasses. Upper and lower dentures also present at bedside. NPO for angiogram today. Verified w/ cardiology NP if patient ok to receive scheduled plavix this morning - per NP, ok for patient to receive prior to procedure.    Discussed POC w/ pt and family at bedside.    1620: repeat   1859: sheath pulled -- manual pressure    Problem: Patient/Family Goals  Goal: Patient/Family Long Term Goal  Description: Patient's Long Term Goal: to go home    Interventions:  - MD rounding, medication management, monitor labs, angiogram, ICD upgrade  - See additional Care Plan goals for specific interventions  Outcome: Progressing  Goal: Patient/Family Short Term Goal  Description: Patient's Short Term Goal: remain pain free    Interventions:   - pain assessments q4, pain meds PRN  - See additional Care Plan goals for specific interventions  Outcome: Progressing     Problem: CARDIOVASCULAR - ADULT  Goal: Maintains optimal cardiac output and hemodynamic stability  Description: INTERVENTIONS:  - Monitor vital signs, rhythm, and trends  - Monitor for bleeding, hypotension and signs of decreased cardiac output  - Evaluate effectiveness of vasoactive medications to optimize hemodynamic stability  - Monitor arterial and/or venous puncture sites for bleeding and/or hematoma  - Assess quality of pulses, skin color and temperature  - Assess for signs of decreased coronary artery perfusion - ex. Angina  - Evaluate fluid balance, assess for edema, trend weights  Outcome: Progressing  Goal: Absence of cardiac arrhythmias or at baseline  Description: INTERVENTIONS:  - Continuous cardiac monitoring, monitor vital signs, obtain 12 lead EKG if  indicated  - Evaluate effectiveness of antiarrhythmic and heart rate control medications as ordered  - Initiate emergency measures for life threatening arrhythmias  - Monitor electrolytes and administer replacement therapy as ordered  Outcome: Progressing     Problem: RESPIRATORY - ADULT  Goal: Achieves optimal ventilation and oxygenation  Description: INTERVENTIONS:  - Assess for changes in respiratory status  - Assess for changes in mentation and behavior  - Position to facilitate oxygenation and minimize respiratory effort  - Oxygen supplementation based on oxygen saturation or ABGs  - Provide Smoking Cessation handout, if applicable  - Encourage broncho-pulmonary hygiene including cough, deep breathe, Incentive Spirometry  - Assess the need for suctioning and perform as needed  - Assess and instruct to report SOB or any respiratory difficulty  - Respiratory Therapy support as indicated  - Manage/alleviate anxiety  - Monitor for signs/symptoms of CO2 retention  Outcome: Progressing

## 2024-10-24 NOTE — PROCEDURES
[facility]   part of [facility]    Cardiac Cath Procedure Note  Mingo White Patient Status:  Inpatient    1945 MRN [id number]   Location [facility] 2NE-A Attending Karina Rothman,    Hosp Day # 4 PCP Daren Espino MD       Cardiologist: Chelsea Gage MD  Primary Proceduralist: Chelsea Gage MD  Procedure Performed: LHC, Vein graft angiography, IVUS imaging of left circumflex and left main, PCI with NORRIS placement to OM1, Left arm venography  Date of Procedure: 10/24/2024   Indication: Ventricular arrhythmias    Consent:   Informed written consent was obtained from the patient after risk benefits and alternative explained the patient.  Patient agreed to proceed    Sedation  IV conscious sedation was achieved with Versed and fentanyl.  It was administered under my direct supervision.  Hemodynamic monitoring took place throughout the entire procedure by myself in the Cath Lab staff.  3 mg of Versed and 75mcg of Fentanyl were given.  Start Time: 14:14 End Time: 15:15      Description of the procedure: After written informed consent was obtained from the patient, patient was brought to the cardiac catheterization laboratory in a stable condition and fasting state.  Patient was prepped and draped in the usual sterile fashion.  IV conscious sedation was achieved with Versed and fentanyl.  Lidocaine 1% was used to infiltrate the right groin  for local anesthesia. Right femoral artery was assessed with micropuncture needle using ultrasound and fluoroscopy guidance. A 6 Bulgarian introducer sheath was inserted into the right femoral artery.   A FL 4 catheter was used to engage the left main. Angiographic images were obtained.   A FR4 catheter was used to engage the RCA. Angiographic images were obtained.   A FR4 catheter was used to engage the SVG to RCA. Angiographic images were obtained.   A IM catheter was used to engage the LIMA to LAD. Angiographic images were obtained.     Specimen sent to:  No specimen collected  Estimated blood loss: 10 cc  Closure:  Manual pressure    Left Ventriculography and hemodynamics:   LV EDP 12 mmHg  No gradient across aortic valve      Coronary Angiography  RCA:  100% occluded at the proximal segment. This is a chronic total occlusion.   Left main:  Large sized vessel, No significant CAD  Left anterior descending:  There is a 100% occlusion of the proximal LAD. This is a   Circumflex:  Large sized vessel. There is a 85% stenosis of the ostial OM1 at its proximal segment. This is culprit for patient presentation of VT. Patient underwent intervention with placement of NORRIS. Excellent result. RTUDY 3 flow at completion. 0% residual stenosis  LIMA to LAD: Large size. Patent  SVG to RCA: Large size. Patent    Intervention: OM1  Lesion Characteristics- Mild torturous, Mild calcified.  Type non-C lesion.  Pre-intervention stenosis 85%, Post intervention stenosis 0%.  Pre TRUDY 3, Post TRUDY 3.      Guide Catheter: EBU 3.5  Wire: Run through guidewire in OM, Mode Blue in mid circumflex  Pre-dil:  2.5 x 15 mm compliant balloon  Stent: 2.5 x 24 mm NORRIS  Post-dil:  2.5 x 15 mm NC   IVUS imaging showed appropriate stent apposition. There was no stent edge dissection    Left upper extremity venogram:  Left upper extremity venogram was performed. Showed patent axillary and subclavian veins with mild to moderate stenosis and collateral flow.    Assessment:  CAD s/p CABG, s/p PCI to OM  2. VT    Recommendations:    Recommend DAPT with ASA and plavix  Routine post cath and post PCI care  Finding of cardiac catheterization and further management was discussed in detail with patient.     Specimen sent to: No specimen collected  Estimated blood loss: 10 cc  Closure:  Manual pressure      Chelsea Gage MD  10/24/24

## 2024-10-24 NOTE — PROGRESS NOTES
[facility]  Hospitalist Progress Note                                                                     [facility]   part of [facility]  6/5/1945    SUBJECTIVE:  Seen and examined at bedside.  No overnight events.  Off oxygen.  Plan for cardiac cath today.  Denies any chest pain or shortness of breath. Complains of some congestion      OBJECTIVE:  Temp:  [97.5 °F (36.4 °C)-98.6 °F (37 °C)] 97.7 °F (36.5 °C)  Pulse:  [60-74] 60  Resp:  [16-22] 18  BP: (103-130)/(40-65) 125/65  SpO2:  [87 %-95 %] 93 %  Exam  Gen: No acute distress, alert and oriented x3, no focal neurologic deficits  Pulm: Scattered wheezing, intermittent rales noted, productive cough, off O2  CV: Heart with regular rate and rhythm, no murmur.  Normal PMI.    Abd: Abdomen soft, nontender, nondistended, no organomegaly, bowel sounds present  MSK: Full range of motion in extremities, no clubbing, no cyanosis  Skin: no rashes or lesions    Labs:   Recent Labs   Lab 10/19/24  2243 10/21/24  0424 10/22/24  0438 10/23/24  0509 10/24/24  0604   WBC 7.5 11.3* 12.0* 10.3 10.9   HGB 12.8* 11.6* 11.1* 11.2* 10.9*   MCV 87.3 87.9 89.9 88.3 87.6   .0* 109.0* 111.0* 108.0* 111.0*   INR 1.01  --   --   --   --        Recent Labs   Lab 10/20/24  0851 10/20/24  1145 10/20/24  1456 10/21/24  0424 10/22/24  0438 10/22/24  0824 10/23/24  0509 10/24/24  0605   * 133*  --  131* 130* 132* 136 137   K 5.8* 6.1*   < > 4.7 4.8 4.4 4.1 4.0    107  --  105 102 102 105 107   CO2 24.0 22.0  --  23.0 14.0* 22.0 23.0 23.0   BUN 48* 41*  --  46* 43* 60* 69* 70*   CREATSERUM 2.06* 2.16*  --  1.94* 2.32* 2.29* 2.16* 2.17*   CA 9.7 9.6  --  9.3 9.2 9.4 9.3 9.3   MG 2.0  --   --  1.8 2.0  --  2.4 2.4   PHOS 4.3 4.1  --   --  5.5*  --  5.0 4.9   * 242*  --  189* 197* 207* 186* 185*    < > = values in this interval not displayed.       Recent Labs   Lab 10/19/24  3276  10/20/24  1145 10/21/24  0424 10/22/24  0438 10/23/24  0509 10/24/24  0605   ALT 14  --  10  --   --   --    AST 15  --  10  --   --   --    ALB 4.6 4.3 3.7 3.5 3.7 3.6       Recent Labs   Lab 10/23/24  1200 10/23/24  1726 10/23/24  2108 10/24/24  0535 10/24/24  1205   PGLU 219* 237* 168* 193* 150*       Meds:   Scheduled:    atorvastatin  40 mg Oral Nightly    insulin degludec  10 Units Subcutaneous Daily    amiodarone  400 mg Oral TID    predniSONE  40 mg Oral Daily with breakfast    sodium ferric gluconate  125 mg Intravenous Daily    ipratropium-albuterol  3 mL Nebulization 4 times per day    heparin  5,000 Units Subcutaneous Q8H JENNIFFER    aspirin  81 mg Oral Nightly    carvedilol  12.5 mg Oral BID    clopidogrel  75 mg Oral Daily    pantoprazole  40 mg Oral QAM AC    insulin aspart  2-10 Units Subcutaneous TID AC and HS    budesonide  0.5 mg Nebulization 2 times daily    arformoterol  15 mcg Nebulization 2 times daily     Continuous Infusions:       PRN:   acetaminophen **OR** HYDROcodone-acetaminophen **OR** HYDROcodone-acetaminophen    metoclopramide    albuterol    fentaNYL    Assessment/Plan:  Principal Problem:    Ventricular tachyarrhythmia (HCC)  Active Problems:    V-tach (HCC)    Ischemic cardiomyopathy with implantable cardioverter-defibrillator (ICD)    79 year old male with PMH sig for ischemic cardiomyopathy with an EF of 25%, history of VT status post AICD, chronic systolic heart failure, AAA status post EVAR, diabetes mellitus type 2, dyslipidemia, left renal artery stenosis presented after his ICD fired 4 times.      Ventricular tachycardia with appropriate shocks  S/p DCICD  - cont amio infusion, wean per cardiology  - coreg  - echo reviewed  - Evaluated by electrophysiologist, plan for upgrade to CRT pacemaker likely Friday  - further management per cardiology      Acute Hypercapnic Respiratory failure from acute COPD exacerbation-improving  - pulmonology following  -Off BiPAP,  -IV steroids  tapered off to p.o.  - budesonide/brovana     ICMP   Chronic systolic heart failure  - cardiology following  - further diuresis per cardiology/nephrology   - daily weights, I/Os  - coreg  -2D echo with EF 15-20% with severe wall motion abnormality, see echo report for details,-this was previously noted on echo  - maximize GDMT as bp permits  - Given ectopy and multiple other risk factors including smoking, ischemic evaluation is warranted.  Plan for cardiac catheterization today per cardiology    MAIA on CKD with hyperkalemia  - s/p lokelma x3 doses per nephrology   - hold lisinopril   - I/Os, UOP  - Creatinine improving  - nephrology following, case discussed     DM2 with hyperglycemia  - ISS/accucheks  - Worsened given steroids  - Continue Tresiba 10 units daily     Hyperlipidemia  - statin     AAA s/p EVAR  - stable     FEN: regular diet, PT/OT  Proph: SCDs, heparin  Code status: Full code     Karina Rothman DO  [facility]

## 2024-10-24 NOTE — PLAN OF CARE
Assumed care of pt at 1930.  Pt A&Ox 4. On RA; BiPAP NOC. A-V paced on tele; no c/o cardiac symptoms. Pt denies chest pain or discomfort; seems to be resting comfortably at this time. Pt up with one assist and a walker.     POC cath in am.     Plan of care reviewed with pt; all questions answered at this time. Bed in lowest position; call light within reach. High fall risk precautions are in place per unit protocol.     Problem: RESPIRATORY - ADULT  Goal: Achieves optimal ventilation and oxygenation  Description: INTERVENTIONS:  - Assess for changes in respiratory status  - Assess for changes in mentation and behavior  - Position to facilitate oxygenation and minimize respiratory effort  - Oxygen supplementation based on oxygen saturation or ABGs  - Provide Smoking Cessation handout, if applicable  - Encourage broncho-pulmonary hygiene including cough, deep breathe, Incentive Spirometry  - Assess the need for suctioning and perform as needed  - Assess and instruct to report SOB or any respiratory difficulty  - Respiratory Therapy support as indicated  - Manage/alleviate anxiety  - Monitor for signs/symptoms of CO2 retention  Outcome: Progressing     Problem: CARDIOVASCULAR - ADULT  Goal: Maintains optimal cardiac output and hemodynamic stability  Description: INTERVENTIONS:  - Monitor vital signs, rhythm, and trends  - Monitor for bleeding, hypotension and signs of decreased cardiac output  - Evaluate effectiveness of vasoactive medications to optimize hemodynamic stability  - Monitor arterial and/or venous puncture sites for bleeding and/or hematoma  - Assess quality of pulses, skin color and temperature  - Assess for signs of decreased coronary artery perfusion - ex. Angina  - Evaluate fluid balance, assess for edema, trend weights  Outcome: Progressing  Goal: Absence of cardiac arrhythmias or at baseline  Description: INTERVENTIONS:  - Continuous cardiac monitoring, monitor vital signs, obtain 12 lead EKG if  indicated  - Evaluate effectiveness of antiarrhythmic and heart rate control medications as ordered  - Initiate emergency measures for life threatening arrhythmias  - Monitor electrolytes and administer replacement therapy as ordered  Outcome: Progressing

## 2024-10-25 ENCOUNTER — APPOINTMENT (OUTPATIENT)
Dept: INTERVENTIONAL RADIOLOGY/VASCULAR | Facility: HOSPITAL | Age: 79
End: 2024-10-25
Attending: INTERNAL MEDICINE
Payer: MEDICARE

## 2024-10-25 ENCOUNTER — APPOINTMENT (OUTPATIENT)
Dept: GENERAL RADIOLOGY | Facility: HOSPITAL | Age: 79
End: 2024-10-25
Attending: INTERNAL MEDICINE
Payer: MEDICARE

## 2024-10-25 LAB
ALBUMIN SERPL-MCNC: 3.3 G/DL (ref 3.2–4.8)
ANION GAP SERPL CALC-SCNC: 7 MMOL/L (ref 0–18)
ATRIAL RATE: 63 BPM
BUN BLD-MCNC: 77 MG/DL (ref 9–23)
CALCIUM BLD-MCNC: 8.8 MG/DL (ref 8.7–10.4)
CHLORIDE SERPL-SCNC: 111 MMOL/L (ref 98–112)
CO2 SERPL-SCNC: 22 MMOL/L (ref 21–32)
CREAT BLD-MCNC: 1.95 MG/DL
EGFRCR SERPLBLD CKD-EPI 2021: 34 ML/MIN/1.73M2 (ref 60–?)
GLUCOSE BLD-MCNC: 109 MG/DL (ref 70–99)
GLUCOSE BLD-MCNC: 115 MG/DL (ref 70–99)
GLUCOSE BLD-MCNC: 123 MG/DL (ref 70–99)
GLUCOSE BLD-MCNC: 129 MG/DL (ref 70–99)
MAGNESIUM SERPL-MCNC: 2.6 MG/DL (ref 1.6–2.6)
OSMOLALITY SERPL CALC.SUM OF ELEC: 314 MOSM/KG (ref 275–295)
P AXIS: 87 DEGREES
P-R INTERVAL: 230 MS
PHOSPHATE SERPL-MCNC: 4.9 MG/DL (ref 2.4–5.1)
POTASSIUM SERPL-SCNC: 4 MMOL/L (ref 3.5–5.1)
Q-T INTERVAL: 508 MS
QRS DURATION: 200 MS
QTC CALCULATION (BEZET): 519 MS
R AXIS: 107 DEGREES
SODIUM SERPL-SCNC: 140 MMOL/L (ref 136–145)
T AXIS: 69 DEGREES
VENTRICULAR RATE: 63 BPM

## 2024-10-25 PROCEDURE — 94640 AIRWAY INHALATION TREATMENT: CPT

## 2024-10-25 PROCEDURE — 0JPT0PZ REMOVAL OF CARDIAC RHYTHM RELATED DEVICE FROM TRUNK SUBCUTANEOUS TISSUE AND FASCIA, OPEN APPROACH: ICD-10-PCS | Performed by: INTERNAL MEDICINE

## 2024-10-25 PROCEDURE — 99153 MOD SED SAME PHYS/QHP EA: CPT | Performed by: INTERNAL MEDICINE

## 2024-10-25 PROCEDURE — 82962 GLUCOSE BLOOD TEST: CPT

## 2024-10-25 PROCEDURE — 33262 RMVL& REPLC PULSE GEN 1 LEAD: CPT | Performed by: INTERNAL MEDICINE

## 2024-10-25 PROCEDURE — 0JH609Z INSERTION OF CARDIAC RESYNCHRONIZATION DEFIBRILLATOR PULSE GENERATOR INTO CHEST SUBCUTANEOUS TISSUE AND FASCIA, OPEN APPROACH: ICD-10-PCS | Performed by: INTERNAL MEDICINE

## 2024-10-25 PROCEDURE — 80069 RENAL FUNCTION PANEL: CPT | Performed by: INTERNAL MEDICINE

## 2024-10-25 PROCEDURE — 71045 X-RAY EXAM CHEST 1 VIEW: CPT | Performed by: INTERNAL MEDICINE

## 2024-10-25 PROCEDURE — 33263 RMVL & RPLCMT DFB GEN 2 LEAD: CPT | Performed by: INTERNAL MEDICINE

## 2024-10-25 PROCEDURE — 99211 OFF/OP EST MAY X REQ PHY/QHP: CPT

## 2024-10-25 PROCEDURE — 99152 MOD SED SAME PHYS/QHP 5/>YRS: CPT | Performed by: INTERNAL MEDICINE

## 2024-10-25 PROCEDURE — 3E0102A INTRODUCTION OF ANTI-INFECTIVE ENVELOPE INTO SUBCUTANEOUS TISSUE, OPEN APPROACH: ICD-10-PCS | Performed by: INTERNAL MEDICINE

## 2024-10-25 PROCEDURE — 83735 ASSAY OF MAGNESIUM: CPT | Performed by: INTERNAL MEDICINE

## 2024-10-25 RX ORDER — IODIXANOL 320 MG/ML
100 INJECTION, SOLUTION INTRAVASCULAR
Status: COMPLETED | OUTPATIENT
Start: 2024-10-25 | End: 2024-10-25

## 2024-10-25 RX ORDER — VANCOMYCIN HYDROCHLORIDE 1 G/20ML
INJECTION, POWDER, LYOPHILIZED, FOR SOLUTION INTRAVENOUS
Status: COMPLETED
Start: 2024-10-25 | End: 2024-10-25

## 2024-10-25 RX ORDER — NALOXONE HYDROCHLORIDE 0.4 MG/ML
INJECTION, SOLUTION INTRAMUSCULAR; INTRAVENOUS; SUBCUTANEOUS
Status: COMPLETED
Start: 2024-10-25 | End: 2024-10-25

## 2024-10-25 RX ORDER — ONDANSETRON 2 MG/ML
4 INJECTION INTRAMUSCULAR; INTRAVENOUS EVERY 6 HOURS PRN
Status: DISCONTINUED | OUTPATIENT
Start: 2024-10-25 | End: 2024-10-28

## 2024-10-25 RX ORDER — CHLORHEXIDINE GLUCONATE 40 MG/ML
SOLUTION TOPICAL
Status: DISCONTINUED | OUTPATIENT
Start: 2024-10-26 | End: 2024-10-25 | Stop reason: HOSPADM

## 2024-10-25 RX ORDER — LIDOCAINE HYDROCHLORIDE 10 MG/ML
INJECTION, SOLUTION EPIDURAL; INFILTRATION; INTRACAUDAL; PERINEURAL
Status: COMPLETED
Start: 2024-10-25 | End: 2024-10-25

## 2024-10-25 RX ORDER — MIDAZOLAM HYDROCHLORIDE 1 MG/ML
INJECTION INTRAMUSCULAR; INTRAVENOUS
Status: COMPLETED
Start: 2024-10-25 | End: 2024-10-25

## 2024-10-25 NOTE — PLAN OF CARE
Assumed patient care at 1930  In bed, alert and oriented x 4. IV fluids infusing, frequent vital sign monitoring and right groin monitoring in progress. CDI right groin dressing, soft, non-tender to groin site. Plan of care discussed with patient,  regarding evening medications, fall prevention measures., NPO after midnight for Pacemaker generator.Telemetry monitoring in progress. A-V paced. Denies chest pain or shortness of breath at rest. POC glucose monitoring in progress, before meals and at bedtime.  Problem: Patient/Family Goals  Goal: Patient/Family Long Term Goal  Description: Patient's Long Term Goal: to go home    Interventions:  - MD rounding, medication management, monitor labs, angiogram, ICD upgrade  - See additional Care Plan goals for specific interventions  10/24/2024 2353 by Jacobo Moss RN  Outcome: Progressing  10/24/2024 2319 by Jacobo Moss RN  Outcome: Progressing  Goal: Patient/Family Short Term Goal  Description: Patient's Short Term Goal: remain pain free    Interventions:   - pain assessments q4, pain meds PRN  - See additional Care Plan goals for specific interventions  10/24/2024 2353 by Jacobo Moss RN  Outcome: Progressing  10/24/2024 2319 by Jacobo Moss RN  Outcome: Progressing     Problem: CARDIOVASCULAR - ADULT  Goal: Maintains optimal cardiac output and hemodynamic stability  Description: INTERVENTIONS:  - Monitor vital signs, rhythm, and trends  - Monitor for bleeding, hypotension and signs of decreased cardiac output  - Evaluate effectiveness of vasoactive medications to optimize hemodynamic stability  - Monitor arterial and/or venous puncture sites for bleeding and/or hematoma  - Assess quality of pulses, skin color and temperature  - Assess for signs of decreased coronary artery perfusion - ex. Angina  - Evaluate fluid balance, assess for edema, trend weights  10/24/2024 2353 by Jacobo Moss RN  Outcome: Progressing  10/24/2024 2319 by  Jacobo Moss, RN  Outcome: Progressing  Goal: Absence of cardiac arrhythmias or at baseline  Description: INTERVENTIONS:  - Continuous cardiac monitoring, monitor vital signs, obtain 12 lead EKG if indicated  - Evaluate effectiveness of antiarrhythmic and heart rate control medications as ordered  - Initiate emergency measures for life threatening arrhythmias  - Monitor electrolytes and administer replacement therapy as ordered  10/24/2024 2353 by Jacobo Moss, RN  Outcome: Progressing  10/24/2024 2319 by Jacobo Moss RN  Outcome: Progressing     Problem: RESPIRATORY - ADULT  Goal: Achieves optimal ventilation and oxygenation  Description: INTERVENTIONS:  - Assess for changes in respiratory status  - Assess for changes in mentation and behavior  - Position to facilitate oxygenation and minimize respiratory effort  - Oxygen supplementation based on oxygen saturation or ABGs  - Provide Smoking Cessation handout, if applicable  - Encourage broncho-pulmonary hygiene including cough, deep breathe, Incentive Spirometry  - Assess the need for suctioning and perform as needed  - Assess and instruct to report SOB or any respiratory difficulty  - Respiratory Therapy support as indicated  - Manage/alleviate anxiety  - Monitor for signs/symptoms of CO2 retention  10/24/2024 2353 by Jacobo Moss, RN  Outcome: Progressing  10/24/2024 2319 by Jacobo Moss RN  Outcome: Progressing

## 2024-10-25 NOTE — PROGRESS NOTES
Weak today  S/p PCI to Cx yesterday  Venogram: subclavian open but collaterals present  No ventricular tachycardia          Past Medical History:    Anesthesia complication    was \"out of it\" for a long time after sedation with previous colonoscopies    Back problem    Carotid artery disease (HCC)    Congestive heart disease (HCC)    CORONARY ARTERY DISEASE    Coronary atherosclerosis    DIABETES    Diabetes (HCC)    Gastric ulcer    HEART ATTACK    age 61    High blood pressure    HYPERLIPIDEMIA    Sinus drainage    with weather changes         Medications:  No current outpatient medications on file.       Social: The Patient denies tobacco, illicit drug use, and alcohol abuse    Family: The patient denies a history of sudden cardiac death or premature coronary artery disease    Allergies:  Allergies[1]       Physical:  /53 (BP Location: Left arm)   Pulse 60   Temp 97.1 °F (36.2 °C) (Oral)   Resp 19   Wt 136 lb 3.9 oz (61.8 kg)   SpO2 99%   BMI 21.99 kg/m²   GENERAL: well developed, well nourished, in no apparent distress  EYES: sclera anicteric  HEENT: normocephalic  NECK: no JVD, no carotid bruits, no thyromegaly  RESPIRATORY: clear to auscultation  CARDIOVASCULAR: S1, S2 normal, RRR; no S3, no S4; no click; no murmur  ABDOMEN: normal active BS, soft, nondistended; nontender  EXTREMITIES: no cyanosis, clubbing or edema, peripheral pulses intact  NEURO: no sensorimotor deficits  PSYCHIATRIC: alert and oriented x 3, affect normal  SKIN: no rashes ICD site well healed     Data:  No results found for: \"TSH\"  BUN (mg/dL)   Date Value   10/25/2024 77 (H)   10/24/2024 70 (H)     Blood Urea Nitrogen (mg/dL)   Date Value   03/14/2022 30.0 (H)   11/16/2021 36.0 (H)   05/17/2016 31 (H)   01/20/2016 28 (H)     Creatinine, Serum (mg/dL)   Date Value   05/17/2016 1.38 (H)   01/20/2016 1.35 (H)     Creatinine (mg/dL)   Date Value   10/25/2024 1.95 (H)   10/24/2024 2.17 (H)   03/14/2022 1.39 (H)   11/16/2021 1.50 (H)        ECG: NSR LAE V pacing QRS > 170  Tele - SR  Echo: 10/21/24       Conclusions:     1. Left ventricle: The cavity size was mildly to moderately increased.      Systolic function was markedly reduced. The estimated ejection fraction      was 15-20%, by visual assessment. There was severe diffuse hypokinesis      with regional variations. There was no evidence of a thrombus revealed by      acoustic contrast opacification.   2. Right ventricle: Pacer C/W ICD noted in the right ventricle.   3. Left atrium: The left atrial volume was mildly increased.   4. Right atrium: Pacer C/W ICD noted in right atrium.   5. Mitral valve: There was mild to moderate regurgitation.   6. Pulmonary arteries: Systolic pressure was mildly increased, in the range      of 35mm Hg to 40mm Hg.   Impressions:  This study is compared with previous dated 10/20/2024: No   significant change in ejection fraction.       Impression:  ICM s/p DCICD 2007, [employer]   VT  S/p PCI Cx 10/24/2024  CKD Cr 2's  Hyperkalemia on intake labs  COPD quit 2023  IVCD      Plan:  Amiodarone 400mg tid  PCI to Cx would not be expected to improve EF by 20% or more and he has a preexisitng low EF that would have qualified for upgrade to BiVICD. Therefore, will proceed with plans today. The risks (including, but not limited to, hematoma, infection, pneumothorax, tamponade, renal failure from IV dye, damage to any existing leads, myocardial infarction, stroke, and death), benefits (CRT), and alternatives (no change) of the procedure were discussed. The patient understands and agrees to proceed.       [1]   Allergies  Allergen Reactions    Niaspan [Niacin, Antihyperlipidemic] ITCHING and FACE FLUSHING    Heparin UNKNOWN     States possible allergy to heparin. Blood sugar elevated when he was given heparin

## 2024-10-25 NOTE — PROGRESS NOTES
[facility]  Hospitalist Progress Note                                                                     [facility]   part of [facility]  6/5/1945    SUBJECTIVE:  Seen and examined at bedside.  No overnight events.  Tolerated PCI yesterday,. Plan for pacemaker replacement today denies any chest pain or shortness of breath.  Increased productive cough      OBJECTIVE:  Temp:  [97.1 °F (36.2 °C)-98.2 °F (36.8 °C)] 97.2 °F (36.2 °C)  Pulse:  [60-66] 60  Resp:  [18-19] 18  BP: ()/(42-85) 112/54  SpO2:  [87 %-99 %] 89 %  Exam  Gen: No acute distress, alert and oriented x3, no focal neurologic deficits  Pulm: Scattered wheezing, intermittent rattles noted, productive cough, off O2  CV: Heart with regular rate and rhythm, no murmur.  Normal PMI.    Abd: Abdomen soft, nontender, nondistended, no organomegaly, bowel sounds present  MSK: Full range of motion in extremities, no clubbing, no cyanosis  Skin: no rashes or lesions    Labs:   Recent Labs   Lab 10/19/24  2243 10/21/24  0424 10/22/24  0438 10/23/24  0509 10/24/24  0604   WBC 7.5 11.3* 12.0* 10.3 10.9   HGB 12.8* 11.6* 11.1* 11.2* 10.9*   MCV 87.3 87.9 89.9 88.3 87.6   .0* 109.0* 111.0* 108.0* 111.0*   INR 1.01  --   --   --   --        Recent Labs   Lab 10/20/24  1145 10/20/24  1456 10/21/24  0424 10/22/24  0438 10/22/24  0824 10/23/24  0509 10/24/24  0605 10/25/24  0605   *  --  131* 130* 132* 136 137 140   K 6.1*   < > 4.7 4.8 4.4 4.1 4.0 4.0     --  105 102 102 105 107 111   CO2 22.0  --  23.0 14.0* 22.0 23.0 23.0 22.0   BUN 41*  --  46* 43* 60* 69* 70* 77*   CREATSERUM 2.16*  --  1.94* 2.32* 2.29* 2.16* 2.17* 1.95*   CA 9.6  --  9.3 9.2 9.4 9.3 9.3 8.8   MG  --   --  1.8 2.0  --  2.4 2.4 2.6   PHOS 4.1  --   --  5.5*  --  5.0 4.9 4.9   *  --  189* 197* 207* 186* 185* 109*    < > = values in this interval not displayed.       Recent Labs   Lab  10/19/24  2243 10/20/24  1145 10/21/24  0424 10/22/24  0438 10/23/24  0509 10/24/24  0605 10/25/24  0605   ALT 14  --  10  --   --   --   --    AST 15  --  10  --   --   --   --    ALB 4.6   < > 3.7 3.5 3.7 3.6 3.3    < > = values in this interval not displayed.       Recent Labs   Lab 10/24/24  1205 10/24/24  1633 10/24/24  2251 10/25/24  0530 10/25/24  1132   PGLU 150* 156* 177* 115* 123*       Meds:   Scheduled:    [START ON 10/26/2024] chlorhexidine   Topical On Call    ceFAZolin  2 g Intravenous 30 Min Pre-Op    atorvastatin  40 mg Oral Nightly    insulin degludec  10 Units Subcutaneous Daily    amiodarone  400 mg Oral TID    predniSONE  40 mg Oral Daily with breakfast    sodium ferric gluconate  125 mg Intravenous Daily    ipratropium-albuterol  3 mL Nebulization 4 times per day    heparin  5,000 Units Subcutaneous Q8H JENNIFFER    aspirin  81 mg Oral Nightly    carvedilol  12.5 mg Oral BID    clopidogrel  75 mg Oral Daily    pantoprazole  40 mg Oral QAM AC    insulin aspart  2-10 Units Subcutaneous TID AC and HS    budesonide  0.5 mg Nebulization 2 times daily    arformoterol  15 mcg Nebulization 2 times daily     Continuous Infusions:    sodium chloride Stopped (10/24/24 2251)       PRN:   acetaminophen **OR** HYDROcodone-acetaminophen **OR** HYDROcodone-acetaminophen    metoclopramide    albuterol    fentaNYL    Assessment/Plan:  Principal Problem:    Ventricular tachyarrhythmia (HCC)  Active Problems:    V-tach (HCC)    Ischemic cardiomyopathy with implantable cardioverter-defibrillator (ICD)    79 year old male with PMH sig for ischemic cardiomyopathy with an EF of 25%, history of VT status post AICD, chronic systolic heart failure, AAA status post EVAR, diabetes mellitus type 2, dyslipidemia, left renal artery stenosis presented after his ICD fired 4 times.      Ventricular tachycardia with appropriate shocks  S/p DCICD  - cont amio infusion, wean per cardiology  - coreg  - echo reviewed  - Evaluated by  electrophysiologist, plan for upgrade to CRT pacemaker 10/25  - further management per cardiology      Acute Hypercapnic Respiratory failure from acute COPD exacerbation-improving  - pulmonology following  -Off BiPAP,  -IV steroids tapered off to p.o.  - budesonide/brovana     ICMP   Chronic systolic heart failure  - cardiology following  - further diuresis per cardiology/nephrology   - daily weights, I/Os  - coreg  -2D echo with EF 15-20% with severe wall motion abnormality, see echo report for details,-this was previously noted on echo  - maximize GDMT as bp permits  - Given ectopy and multiple other risk factors including smoking, ischemic evaluation is warranted.   -Status post cardiac catheterization 10/24-status post PCI/NORRIS to OM    MAIA on CKD with hyperkalemia  - s/p lokelma x3 doses per nephrology   - hold lisinopril   - I/Os, UOP  - Creatinine improving  - nephrology following, case discussed     DM2 with hyperglycemia  - ISS/accucheks  - Worsened given steroids  - Continue Tresiba 10 units daily     Hyperlipidemia  - statin     AAA s/p EVAR  - stable     Dispo: Pending clinical status    FEN: regular diet, PT/OT  Proph: SCDs, heparin  Code status: Full code     Karina Rothman DO  [facility] Hospitalist  [facility]

## 2024-10-25 NOTE — CARDIAC REHAB
Cardiac rehab education completed with patient and family  Stent card given  Patient referred to cardiac rehab  Patient declined at this time

## 2024-10-25 NOTE — SPIRITUAL CARE NOTE
Spiritual Care Visit Note    Patient Name: Mingo White Date of Spiritual Care Visit: 10/25/24   : 1945 Primary Dx: Ventricular tachyarrhythmia (HCC)       Referred By: Referral From: Other (Comment) (length of stay)    Spiritual Care Taxonomy:    Intended Effects: Demonstrate caring and concern    Methods: Offer spiritual/Jew support         Visit Type/Summary:     - Spiritual Care: Patient alert. No spiritual care services needed at this time.   will remain available for support as needed     Spiritual Care support can be requested via an [facility] consult. For urgent/immediate needs, please contact the On Call  at: Edward: ext [phone]    Jeniffer Aly   Spiritual [facility]

## 2024-10-25 NOTE — PROGRESS NOTES
[facility]    Mingo White Patient Status:  Inpatient    1945 MRN [id number]   Location [facility] 2NE-A Attending Karina Rothman,    Hosp Day # 5 PCP Daren Espino MD     SUBJECTIVE: Pt underwent cardiac cath yesterday with PCI to OM1 of circumflex.  Still gets winded with exertion..       OBJECTIVE:  /54 (BP Location: Right arm)   Pulse 60   Temp 97.2 °F (36.2 °C) (Tympanic)   Resp 18   Wt 136 lb 3.9 oz (61.8 kg)   SpO2 (!) 89%   BMI 21.99 kg/m²   O2 requirement: RA     I/O last 3 completed shifts:  In: 600 [P.O.:600]  Out:  [Urine:]  No intake/output data recorded.     Current Medications:   Current Facility-Administered Medications:     [START ON 10/26/2024] chlorhexidine (Hibiclens) 4 % external liquid, , Topical, On Call    ceFAZolin (Ancef) 2g in 10mL IV syringe premix, 2 g, Intravenous, 30 Min Pre-Op    atorvastatin (Lipitor) tab 40 mg, 40 mg, Oral, Nightly    sodium chloride 0.9% infusion, , Intravenous, Continuous    insulin degludec (Tresiba) 100 units/mL flextouch 10 Units, 10 Units, Subcutaneous, Daily    amiodarone (Pacerone) tab 400 mg, 400 mg, Oral, TID    predniSONE (Deltasone) tab 40 mg, 40 mg, Oral, Daily with breakfast    sodium ferric gluconate (Ferrlecit) 125 mg in sodium chloride 0.9% 100mL IVPB premix, 125 mg, Intravenous, Daily    ipratropium-albuterol (Duoneb) 0.5-2.5 (3) MG/3ML inhalation solution 3 mL, 3 mL, Nebulization, 4 times per day    heparin (Porcine) 5000 UNIT/ML injection 5,000 Units, 5,000 Units, Subcutaneous, Q8H JENNIFFER    acetaminophen (Tylenol) tab 650 mg, 650 mg, Oral, Q4H PRN **OR** HYDROcodone-acetaminophen (Norco) 5-325 MG per tab 1 tablet, 1 tablet, Oral, Q4H PRN **OR** HYDROcodone-acetaminophen (Norco) 5-325 MG per tab 2 tablet, 2 tablet, Oral, Q4H PRN    metoclopramide (Reglan) 5 mg/mL injection 5 mg, 5 mg, Intravenous, Q8H PRN    albuterol (Ventolin HFA) 108 (90 Base) MCG/ACT inhaler 2 puff, 2 puff, Inhalation, Q4H PRN    aspirin  DR tab 81 mg, 81 mg, Oral, Nightly    carvedilol (Coreg) tab 12.5 mg, 12.5 mg, Oral, BID    clopidogrel (Plavix) tab 75 mg, 75 mg, Oral, Daily    pantoprazole (Protonix) DR tab 40 mg, 40 mg, Oral, QAM AC    insulin aspart (NovoLOG) 100 Units/mL FlexPen 2-10 Units, 2-10 Units, Subcutaneous, TID AC and HS    budesonide (Pulmicort) 0.5 MG/2ML nebulizer suspension 0.5 mg, 0.5 mg, Nebulization, 2 times daily    arformoterol (Brovana) 15 MCG/2ML nebulizer solution 15 mcg, 15 mcg, Nebulization, 2 times daily    fentaNYL (Sublimaze) 50 mcg/mL injection 25 mcg, 25 mcg, Intravenous, Q5 Min PRN      Physical Exam:                          General: alert, cooperative, in NAD                          HEENT: oropharynx clear without erythema or exudates, moist mucous membranes                          Lungs: Clear to auscultation bilaterally, no wheezes or crackles                           Chest wall: No tenderness or deformity.                          Heart: Regular rate and rhythm, normal S1S2\                          Abdomen: soft, non-tender, non-distended, positive BS.                          Extremity: No clubbing or cyanosis. no edema                          Skin: No rashes or lesions.          Lab Results   Component Value Date     10/25/2024    K 4.0 10/25/2024     10/25/2024    CO2 22.0 10/25/2024    BUN 77 10/25/2024    CREATSERUM 1.95 10/25/2024     10/25/2024    CA 8.8 10/25/2024    ALB 3.3 10/25/2024     Lab Results   Component Value Date    INR 1.01 10/19/2024    INR 1.0 (L) 08/08/2008    INR 1.0 (L) 12/11/2007          Imaging: I have independently visualized all relevant chest imaging in PACS.  I agree with the radiology interpretation except where noted.       ASSESSMENT/PLAN:  Acute Hypercapnic resp failure: 2/2 AECOPD  -weaned off bipap, now on RA  -RVP negtive  -CXR with mild pulmonary vascular congestion without focal infiltrate  -repeat ABG reviewed, improvement in hypercapnea  COPD  Exacerbation  -PO steroids, will plan on short 5 day burst  -budesonide/brovana  -BD protocol  CHF w/ mult AICD firings  -echo with EF: 15-20% severe diffuse hypokinesis with regional variations, G1DD  -amiodarone  -cardiac cath 10/24 with PCI to OM1 of circumflex.    -Cards following; plan for CRT-D upgrade today  -monitor electrolytes  MAIA on CKD  -renal following  FEN:  -ADAT   Proph:  -hep sub Q  Dispo:  -will follow  -discussed with family at bedside    Tierney Shepard MD  10/25/2024  1:06 PM

## 2024-10-25 NOTE — PLAN OF CARE
No acute events overnight,, Incision to right groin Soft , dressing CDI. Denies pain or other discomfort. Voiding without difficulty.

## 2024-10-25 NOTE — PLAN OF CARE
Assumed care of pt at 0730. A/ox4, rm air, SR/AV paced on tele. When patient is SR, first degree block & BBB are present. No reports of pain. Breath sounds clear/diminished upon auscultation. Continues to report non-productive, congested cough. Impaired vision - wears glasses. Upper and lower dentures also present at bedside. NPO for ICD revision today. Verified w/ cardiology if patient ok to receive scheduled plavix this morning prior to procedure - received callback from dr rogers, who stated ok for patient to receive prior to procedure.     Discussed POC w/ pt and family at bedside.    Received call from cath lab that they are sending for patient. Clarified if they would like 1500 dose of amio to be given to patient prior to procedure. Per RN, no need to give dose prior to procedure    Received report on patient following procedure. Patient required 1 dose of narcan, per RN. Clarified w/ RN if any restrictions on assessing patient/holding patient downstairs for a certain amount of time. Per RN, no precautions necessary. Received patient back from procedure just after 1900 - drowsy but arouseable. Left chest incision site covered w/ mepilex, site soft upon palpation. No s/s of hematoma noted. No crepitus palpated. Reassessed patient at 1930, patient continues to be drowsy but arousable. On non-rebreather mask. Site continues to be soft upon palpation, no s/s of hematoma. No crepitus palpated as well.    Problem: Patient/Family Goals  Goal: Patient/Family Long Term Goal  Description: Patient's Long Term Goal: to go home    Interventions:  - MD rounding, medication management, monitor labs, angiogram, ICD upgrade  - See additional Care Plan goals for specific interventions  Outcome: Progressing  Goal: Patient/Family Short Term Goal  Description: Patient's Short Term Goal: remain pain free    Interventions:   - pain assessments q4, pain meds PRN  - See additional Care Plan goals for specific interventions  Outcome:  Progressing     Problem: CARDIOVASCULAR - ADULT  Goal: Maintains optimal cardiac output and hemodynamic stability  Description: INTERVENTIONS:  - Monitor vital signs, rhythm, and trends  - Monitor for bleeding, hypotension and signs of decreased cardiac output  - Evaluate effectiveness of vasoactive medications to optimize hemodynamic stability  - Monitor arterial and/or venous puncture sites for bleeding and/or hematoma  - Assess quality of pulses, skin color and temperature  - Assess for signs of decreased coronary artery perfusion - ex. Angina  - Evaluate fluid balance, assess for edema, trend weights  Outcome: Progressing  Goal: Absence of cardiac arrhythmias or at baseline  Description: INTERVENTIONS:  - Continuous cardiac monitoring, monitor vital signs, obtain 12 lead EKG if indicated  - Evaluate effectiveness of antiarrhythmic and heart rate control medications as ordered  - Initiate emergency measures for life threatening arrhythmias  - Monitor electrolytes and administer replacement therapy as ordered  Outcome: Progressing     Problem: RESPIRATORY - ADULT  Goal: Achieves optimal ventilation and oxygenation  Description: INTERVENTIONS:  - Assess for changes in respiratory status  - Assess for changes in mentation and behavior  - Position to facilitate oxygenation and minimize respiratory effort  - Oxygen supplementation based on oxygen saturation or ABGs  - Provide Smoking Cessation handout, if applicable  - Encourage broncho-pulmonary hygiene including cough, deep breathe, Incentive Spirometry  - Assess the need for suctioning and perform as needed  - Assess and instruct to report SOB or any respiratory difficulty  - Respiratory Therapy support as indicated  - Manage/alleviate anxiety  - Monitor for signs/symptoms of CO2 retention  Outcome: Progressing

## 2024-10-25 NOTE — PROGRESS NOTES
Brief nephrology note  Attempted to see pt x 2, but pt was in procedure ICD revision/upgrade  Labs reviewed. Cr stable and slightly downtrended to 1.95. monitor sp IV contrast on 10/24

## 2024-10-25 NOTE — PROGRESS NOTES
Progress Note  Mingo White Patient Status:  Inpatient    1945 MRN [id number]   Location [facility] 2NE-A Attending Karina Rothman,    Hosp Day # 5 PCP Daren Espino MD     Subjective:  Reports feeling fatigued today, but otherwise well. No chest pain, palpitations, orthopnea, or dyspnea.    Objective:  Physical Exam:   /53 (BP Location: Left arm)   Pulse 60   Temp 97.1 °F (36.2 °C) (Tympanic)   Resp 19   Wt 136 lb 3.9 oz (61.8 kg)   SpO2 91%   BMI 21.99 kg/m²   Temp (24hrs), Av.5 °F (36.4 °C), Min:97.1 °F (36.2 °C), Max:98.2 °F (36.8 °C)       Intake/Output Summary (Last 24 hours) at 10/25/2024 0951  Last data filed at 10/25/2024 0839  Gross per 24 hour   Intake 240 ml   Output 895 ml   Net -655 ml     Wt Readings from Last 3 Encounters:   10/23/24 136 lb 3.9 oz (61.8 kg)   11/15/23 113 lb 1.6 oz (51.3 kg)   22 124 lb (56.2 kg)     Telemetry: NSR intermittent pacing  General: Alert and oriented in no apparent distress lying comfortably in bed flat  HEENT: No focal deficits.  Neck: No JVD, carotids 2+ no bruits.  Cardiac: Regular rate and rhythm, S1, S2 normal, rub or gallop.  Lungs: Clear without wheezes, rales, rhonchi or dullness.  Normal excursions and effort.  Abdomen: Soft, non-tender.   Extremities: Without clubbing, cyanosis or edema.  Peripheral pulses are 2+.  Neurologic: Alert and oriented, normal affect.  Skin: Warm and dry.  Cath site R inguinal benign      Intake/Output:    Intake/Output Summary (Last 24 hours) at 10/25/2024 0951  Last data filed at 10/25/2024 0839  Gross per 24 hour   Intake 240 ml   Output 895 ml   Net -655 ml       Last 3 Weights   10/23/24 0500 136 lb 3.9 oz (61.8 kg)   10/20/24 1208 106 lb 11.2 oz (48.4 kg)   10/20/24 0130 106 lb 11.2 oz (48.4 kg)   10/19/24 2239 112 lb 7 oz (51 kg)   11/15/23 0532 113 lb 1.6 oz (51.3 kg)   23 0426 115 lb 4.8 oz (52.3 kg)   23 2346 122 lb (55.3 kg)   22 0852 124 lb (56.2 kg)        Labs:  Recent Labs   Lab 10/23/24  0509 10/24/24  0605 10/25/24  0605   * 185* 109*   BUN 69* 70* 77*   CREATSERUM 2.16* 2.17* 1.95*   EGFRCR 30* 30* 34*   CA 9.3 9.3 8.8    137 140   K 4.1 4.0 4.0    107 111   CO2 23.0 23.0 22.0     Recent Labs   Lab 10/22/24  0438 10/23/24  0509 10/24/24  0604   RBC 3.77* 3.77* 3.70*   HGB 11.1* 11.2* 10.9*   HCT 33.9* 33.3* 32.4*   MCV 89.9 88.3 87.6   MCH 29.4 29.7 29.5   MCHC 32.7 33.6 33.6   RDW 13.8 13.8 14.0   NEPRELIM 10.96* 9.58* 10.00*   WBC 12.0* 10.3 10.9   .0* 108.0* 111.0*         Recent Labs   Lab 10/19/24  2243 10/20/24  1145   TROPHS 42  --    CK  --  46       Diagnostics:   [facility] 10/24/2024:  CAD s/p CABG, s/p PCI to OM  2. VT  Recommendations:  Recommend DAPT with ASA and plavix  Routine post cath and post PCI care  Finding of cardiac catheterization and further management was discussed in detail with patient.     ECHOCARDIOGRAM 10/21/2024:  1. Left ventricle: The cavity size was mildly to moderately increased.      Systolic function was markedly reduced. The estimated ejection fraction      was 15-20%, by visual assessment. There was severe diffuse hypokinesis      with regional variations. There was no evidence of a thrombus revealed by      acoustic contrast opacification.   2. Right ventricle: Pacer C/W ICD noted in the right ventricle.   3. Left atrium: The left atrial volume was mildly increased.   4. Right atrium: Pacer C/W ICD noted in right atrium.   5. Mitral valve: There was mild to moderate regurgitation.   6. Pulmonary arteries: Systolic pressure was mildly increased, in the range      of 35mm Hg to 40mm Hg.   Impressions:  This study is compared with previous dated 10/20/2024: No   significant change in ejection fraction.   *         Medications:   [START ON 10/26/2024] chlorhexidine   Topical On Call    ceFAZolin  2 g Intravenous 30 Min Pre-Op    atorvastatin  40 mg Oral Nightly    insulin degludec  10 Units  Subcutaneous Daily    amiodarone  400 mg Oral TID    predniSONE  40 mg Oral Daily with breakfast    sodium ferric gluconate  125 mg Intravenous Daily    ipratropium-albuterol  3 mL Nebulization 4 times per day    heparin  5,000 Units Subcutaneous Q8H JENNIFFER    aspirin  81 mg Oral Nightly    carvedilol  12.5 mg Oral BID    clopidogrel  75 mg Oral Daily    pantoprazole  40 mg Oral QAM AC    insulin aspart  2-10 Units Subcutaneous TID AC and HS    budesonide  0.5 mg Nebulization 2 times daily    arformoterol  15 mcg Nebulization 2 times daily      sodium chloride Stopped (10/24/24 3349)       Assessment/Plan:    Ischemic cardiomyopathy HFrEF of 15-20% w/ ICD in place since 2007 and recurrent VT  EP following- documentation reviewed - s/p multiple ICD discharges at presentation  BiV ICD upgrade planned for 10/25  GDMT as tolerated by BP, renal function, and electrolytes  Off MRA hx of hyperkalemia, ACE held d/t hyperkalemia on admit  MV CAD s/p CABG 2006 (LIMA-LAD and SVG-RCA) s/p PCI to OM1 10/24  ASA, Plavix, statin, BB  Chest pain free  CKD   Improved today  Nephrology following  Hyperkalemia  6.1 at presentation. Within normal range today  COPD    PLAN  Continue DAPT, BB, and statin  GDMT as tolerated  CRT-D upgrade planned today  On amiodarone with DULY EP following          Zach Castle PA-C  10/25/2024  9:51 AM     I have independently formulated assessment and plan  I agree with the AP note  S/p PCI to OM  Creatinine stable  Cont with DAPT  Plan for CRT upgrade today as per EP    Thank you for the opportunity to participate in the care of your patient.     Chelsea Gage MD  Interventional Cardiologist  [facility]

## 2024-10-26 LAB
ALBUMIN SERPL-MCNC: 3.2 G/DL (ref 3.2–4.8)
ANION GAP SERPL CALC-SCNC: 8 MMOL/L (ref 0–18)
BASOPHILS # BLD AUTO: 0.02 X10(3) UL (ref 0–0.2)
BASOPHILS NFR BLD AUTO: 0.2 %
BUN BLD-MCNC: 77 MG/DL (ref 9–23)
CALCIUM BLD-MCNC: 8.7 MG/DL (ref 8.7–10.4)
CHLORIDE SERPL-SCNC: 111 MMOL/L (ref 98–112)
CO2 SERPL-SCNC: 21 MMOL/L (ref 21–32)
CREAT BLD-MCNC: 2.07 MG/DL
EGFRCR SERPLBLD CKD-EPI 2021: 32 ML/MIN/1.73M2 (ref 60–?)
EOSINOPHIL # BLD AUTO: 0 X10(3) UL (ref 0–0.7)
EOSINOPHIL NFR BLD AUTO: 0 %
ERYTHROCYTE [DISTWIDTH] IN BLOOD BY AUTOMATED COUNT: 14.7 %
GLUCOSE BLD-MCNC: 128 MG/DL (ref 70–99)
GLUCOSE BLD-MCNC: 129 MG/DL (ref 70–99)
GLUCOSE BLD-MCNC: 139 MG/DL (ref 70–99)
GLUCOSE BLD-MCNC: 151 MG/DL (ref 70–99)
GLUCOSE BLD-MCNC: 181 MG/DL (ref 70–99)
HCT VFR BLD AUTO: 31.2 %
HGB BLD-MCNC: 10.3 G/DL
IMM GRANULOCYTES # BLD AUTO: 0.2 X10(3) UL (ref 0–1)
IMM GRANULOCYTES NFR BLD: 1.7 %
LYMPHOCYTES # BLD AUTO: 0.35 X10(3) UL (ref 1–4)
LYMPHOCYTES NFR BLD AUTO: 3 %
MAGNESIUM SERPL-MCNC: 2.6 MG/DL (ref 1.6–2.6)
MCH RBC QN AUTO: 29.9 PG (ref 26–34)
MCHC RBC AUTO-ENTMCNC: 33 G/DL (ref 31–37)
MCV RBC AUTO: 90.7 FL
MONOCYTES # BLD AUTO: 0.96 X10(3) UL (ref 0.1–1)
MONOCYTES NFR BLD AUTO: 8.1 %
NEUTROPHILS # BLD AUTO: 10.27 X10 (3) UL (ref 1.5–7.7)
NEUTROPHILS # BLD AUTO: 10.27 X10(3) UL (ref 1.5–7.7)
NEUTROPHILS NFR BLD AUTO: 87 %
OSMOLALITY SERPL CALC.SUM OF ELEC: 315 MOSM/KG (ref 275–295)
PHOSPHATE SERPL-MCNC: 5.2 MG/DL (ref 2.4–5.1)
PLATELET # BLD AUTO: 111 10(3)UL (ref 150–450)
POTASSIUM SERPL-SCNC: 4.2 MMOL/L (ref 3.5–5.1)
RBC # BLD AUTO: 3.44 X10(6)UL
SODIUM SERPL-SCNC: 140 MMOL/L (ref 136–145)
WBC # BLD AUTO: 11.8 X10(3) UL (ref 4–11)

## 2024-10-26 PROCEDURE — 94799 UNLISTED PULMONARY SVC/PX: CPT

## 2024-10-26 PROCEDURE — 82962 GLUCOSE BLOOD TEST: CPT

## 2024-10-26 PROCEDURE — 94640 AIRWAY INHALATION TREATMENT: CPT

## 2024-10-26 PROCEDURE — 85025 COMPLETE CBC W/AUTO DIFF WBC: CPT | Performed by: STUDENT IN AN ORGANIZED HEALTH CARE EDUCATION/TRAINING PROGRAM

## 2024-10-26 PROCEDURE — 80069 RENAL FUNCTION PANEL: CPT | Performed by: INTERNAL MEDICINE

## 2024-10-26 PROCEDURE — 83735 ASSAY OF MAGNESIUM: CPT | Performed by: INTERNAL MEDICINE

## 2024-10-26 RX ORDER — AMIODARONE HYDROCHLORIDE 200 MG/1
200 TABLET ORAL 2 TIMES DAILY WITH MEALS
Status: DISCONTINUED | OUTPATIENT
Start: 2024-10-27 | End: 2024-10-28

## 2024-10-26 RX ORDER — IPRATROPIUM BROMIDE AND ALBUTEROL SULFATE 2.5; .5 MG/3ML; MG/3ML
3 SOLUTION RESPIRATORY (INHALATION) EVERY 4 HOURS PRN
Status: DISCONTINUED | OUTPATIENT
Start: 2024-10-26 | End: 2024-10-28

## 2024-10-26 RX ORDER — PREDNISONE 20 MG/1
20 TABLET ORAL DAILY
Qty: 5 TABLET | Refills: 0 | Status: SHIPPED | OUTPATIENT
Start: 2024-10-27 | End: 2024-10-28

## 2024-10-26 NOTE — PROGRESS NOTES
Pulmonary Progress Note     Assessment / Plan:  Acute respiratory failure - due to AECOPD   - supplemental O2 as needed  AECOPD  - prednisone taper  - budesonide and Brovana nebs while inpatient. DC on Anoro.  - BD protocol  Ischemic cardiomyopathy - EF 15-20%  - per cardiology  Dispo  - desat screen   - DC planning      Subjective:  Denies dyspnea. No new complaints.     Objective:  Vitals:    10/25/24 2100 10/25/24 2131 10/25/24 2357 10/26/24 0819   BP: 105/48 100/47 112/54 105/52   BP Location:  Right arm  Right arm   Pulse: 60 60 60 60   Resp:  16  19   Temp:  98.6 °F (37 °C)  97.5 °F (36.4 °C)   TempSrc:  Axillary  Oral   SpO2: 99% 98% 100% 95%   Weight:         Physical Exam:  General: no apparent distress, conversant  Skin: no rash, ulcers or subcutaneous nodules  Eyes: anicteric sclerae, moist conjunctivae  Head, ears, nose, throat: atraumatic, oropharynx clear with moist mucous membranes  Neck: trachea midline with no thyromegaly  Heart: regular rate and rhythm, no murmurs / rubs / gallops  Lungs: clear bilaterally, normal respiratory effort, no accessory muscle use  Extremities: no edema or cyanosis  Psych: interactive, answering questions appropriately, appropriate affect    Medications:  Reviewed in EMR    Lab Data:  Reviewed in EMR    Imaging:  I independently visualized all relevant chest imaging in PACS and agree with radiology interpretation except where noted.

## 2024-10-26 NOTE — PROGRESS NOTES
Feels ok         Past Medical History:    Anesthesia complication    was \"out of it\" for a long time after sedation with previous colonoscopies    Back problem    Carotid artery disease (HCC)    Congestive heart disease (HCC)    CORONARY ARTERY DISEASE    Coronary atherosclerosis    DIABETES    Diabetes (HCC)    Gastric ulcer    HEART ATTACK    age 61    High blood pressure    HYPERLIPIDEMIA    Sinus drainage    with weather changes         Medications:  No current outpatient medications on file.       Social: The Patient denies tobacco, illicit drug use, and alcohol abuse    Family: The patient denies a history of sudden cardiac death or premature coronary artery disease    Allergies:  Allergies[1]       Physical:  /54   Pulse 60   Temp 98.6 °F (37 °C) (Axillary)   Resp 16   Wt 136 lb 3.9 oz (61.8 kg)   SpO2 100%   BMI 21.99 kg/m²   GENERAL: well developed, well nourished, in no apparent distress  EYES: sclera anicteric  HEENT: normocephalic  NECK: no JVD, no carotid bruits, no thyromegaly  RESPIRATORY: clear to auscultation  CARDIOVASCULAR: S1, S2 normal, RRR; no S3, no S4; no click; no murmur  ABDOMEN: normal active BS, soft, nondistended; nontender  EXTREMITIES: no cyanosis, clubbing or edema, peripheral pulses intact  NEURO: no sensorimotor deficits  PSYCHIATRIC: alert and oriented x 3, affect normal  SKIN: IC site normal    Data:  No results found for: \"TSH\"  BUN (mg/dL)   Date Value   10/26/2024 77 (H)   10/25/2024 77 (H)     Blood Urea Nitrogen (mg/dL)   Date Value   03/14/2022 30.0 (H)   11/16/2021 36.0 (H)   05/17/2016 31 (H)   01/20/2016 28 (H)     Creatinine, Serum (mg/dL)   Date Value   05/17/2016 1.38 (H)   01/20/2016 1.35 (H)     Creatinine (mg/dL)   Date Value   10/26/2024 2.07 (H)   10/25/2024 1.95 (H)   03/14/2022 1.39 (H)   11/16/2021 1.50 (H)       ECG: NSR LAE V pacing QRS > 170  Tele - SR, no VT  Pcxr: no ptx  Echo: 10/21/24       Conclusions:     1. Left ventricle: The cavity  size was mildly to moderately increased.      Systolic function was markedly reduced. The estimated ejection fraction      was 15-20%, by visual assessment. There was severe diffuse hypokinesis      with regional variations. There was no evidence of a thrombus revealed by      acoustic contrast opacification.   2. Right ventricle: Pacer C/W ICD noted in the right ventricle.   3. Left atrium: The left atrial volume was mildly increased.   4. Right atrium: Pacer C/W ICD noted in right atrium.   5. Mitral valve: There was mild to moderate regurgitation.   6. Pulmonary arteries: Systolic pressure was mildly increased, in the range      of 35mm Hg to 40mm Hg.   Impressions:  This study is compared with previous dated 10/20/2024: No   significant change in ejection fraction.       Impression:  ICM s/p DCICD 2007, Medtronic   VT  S/p PCI Cx 10/24/2024  CKD Cr 2's  Hyperkalemia on intake labs  COPD quit 2023  IVCD      Plan:  Amiodarone 400mg tid. Change to 200 bid tomorrow  Unable to upgrade to CRTD due to anatomy       [1]   Allergies  Allergen Reactions    Niaspan [Niacin, Antihyperlipidemic] ITCHING and FACE FLUSHING    Heparin UNKNOWN     States possible allergy to heparin. Blood sugar elevated when he was given heparin

## 2024-10-26 NOTE — PLAN OF CARE
Assumed patient care at about 1930 , after return from Procedure of Pacemaker generator change.  Patient is sedated, aroused by calling his name. Frequent vital sign monitoring in progress, including , non-rebreather  on , oxygen saturation 94%.  Family at bedside.  Plan of care updated with family

## 2024-10-26 NOTE — PROGRESS NOTES
[facility]   part of [facility]    Nephrology Progress Note    Mingo White Attending:  Rehana Sanford*     Cc: christine, hyperkalemia    SUBJECTIVE     Resting feels weak today    PHYSICAL EXAM   Vital signs: /52 (BP Location: Right arm)   Pulse 60   Temp 97.5 °F (36.4 °C) (Oral)   Resp 19   Wt 136 lb 3.9 oz (61.8 kg)   SpO2 95%   BMI 21.99 kg/m²   Temp (24hrs), Av.8 °F (36.6 °C), Min:97.2 °F (36.2 °C), Max:98.6 °F (37 °C)       Intake/Output Summary (Last 24 hours) at 10/26/2024 1013  Last data filed at 10/26/2024 0523  Gross per 24 hour   Intake 500 ml   Output 200 ml   Net 300 ml     Wt Readings from Last 3 Encounters:   10/23/24 136 lb 3.9 oz (61.8 kg)   11/15/23 113 lb 1.6 oz (51.3 kg)   22 124 lb (56.2 kg)     General: NAD  HEENT: NCAT, EOMI, MMM  Neck: Supple   Cardiac: Regular rate and rhythm   Lungs: CTAB  Abdomen: Soft, non-tender, nondistended   Extremities: No edema  Neurologic: No asterxis  Skin: Warm and dry, no rashes     MEDS     Current Facility-Administered Medications   Medication Dose Route Frequency    [START ON 10/27/2024] amiodarone (Pacerone) tab 200 mg  200 mg Oral BID with meals    ondansetron (Zofran) 4 MG/2ML injection 4 mg  4 mg Intravenous Q6H PRN    atorvastatin (Lipitor) tab 40 mg  40 mg Oral Nightly    insulin degludec (Tresiba) 100 units/mL flextouch 10 Units  10 Units Subcutaneous Daily    amiodarone (Pacerone) tab 400 mg  400 mg Oral TID    predniSONE (Deltasone) tab 40 mg  40 mg Oral Daily with breakfast    ipratropium-albuterol (Duoneb) 0.5-2.5 (3) MG/3ML inhalation solution 3 mL  3 mL Nebulization 4 times per day    heparin (Porcine) 5000 UNIT/ML injection 5,000 Units  5,000 Units Subcutaneous Q8H JENNIFFER    acetaminophen (Tylenol) tab 650 mg  650 mg Oral Q4H PRN    Or    HYDROcodone-acetaminophen (Norco) 5-325 MG per tab 1 tablet  1 tablet Oral Q4H PRN    Or    HYDROcodone-acetaminophen (Norco) 5-325 MG per tab 2 tablet  2 tablet Oral Q4H PRN     metoclopramide (Reglan) 5 mg/mL injection 5 mg  5 mg Intravenous Q8H PRN    albuterol (Ventolin HFA) 108 (90 Base) MCG/ACT inhaler 2 puff  2 puff Inhalation Q4H PRN    aspirin DR tab 81 mg  81 mg Oral Nightly    carvedilol (Coreg) tab 12.5 mg  12.5 mg Oral BID    clopidogrel (Plavix) tab 75 mg  75 mg Oral Daily    pantoprazole (Protonix) DR tab 40 mg  40 mg Oral QAM AC    insulin aspart (NovoLOG) 100 Units/mL FlexPen 2-10 Units  2-10 Units Subcutaneous TID AC and HS    budesonide (Pulmicort) 0.5 MG/2ML nebulizer suspension 0.5 mg  0.5 mg Nebulization 2 times daily    arformoterol (Brovana) 15 MCG/2ML nebulizer solution 15 mcg  15 mcg Nebulization 2 times daily    fentaNYL (Sublimaze) 50 mcg/mL injection 25 mcg  25 mcg Intravenous Q5 Min PRN       LABS     Lab Results   Component Value Date    WBC 11.8 10/26/2024    HGB 10.3 10/26/2024    HCT 31.2 10/26/2024    .0 10/26/2024    CREATSERUM 2.07 10/26/2024    BUN 77 10/26/2024     10/26/2024    K 4.2 10/26/2024     10/26/2024    CO2 21.0 10/26/2024     10/26/2024    CA 8.7 10/26/2024    ALB 3.2 10/26/2024    MG 2.6 10/26/2024    PHOS 5.2 10/26/2024    PGLU 139 10/26/2024       IMAGING   All imaging studies personally reviewed.    XR CHEST AP PORTABLE  (CPT=71045)   Final Result   PROCEDURE:  XR CHEST AP PORTABLE  (CPT=71045)       TECHNIQUE:  AP chest radiograph was obtained.       COMPARISON:  [facility] , XR, XR CHEST AP PORTABLE  (CPT=71045), 10/21/2024,    8:30 AM.       INDICATIONS:  s/p ICD       PATIENT STATED HISTORY: (As transcribed by Technologist)  Patient offered    no additional history at this time.                               =====   CONCLUSION:    Improvement.  Continued but decrease in mixed interstitial    and airspace opacities in the lung, now primarily at the right and left    lung base.  No new or worsening process.  Continued hyperinflation.     Stable cardiac enlargement.  Stable    pacemaker.  No sign of pneumothorax  or CHF.       LOCATION:  [facility]                       Dictated by (CST): Richardson Carty MD on 10/25/2024 at 11:59 PM        Finalized by (CST): Richardson Carty MD on 10/26/2024 at 0:00 AM         XR CHEST AP PORTABLE  (CPT=71045)   Final Result   PROCEDURE:  XR CHEST AP PORTABLE  (CPT=71045)       TECHNIQUE:  AP chest radiograph was obtained.       COMPARISON:  [facility] , XR, XR CHEST AP PORTABLE  (CPT=71045), 10/20/2024,    9:46 AM.       INDICATIONS:  hypoxic       PATIENT STATED HISTORY: (As transcribed by Technologist)  Patient offered    no additional history at this time.             FINDINGS:  Support devices appear overall stable.  Postsurgical changes of    chest are noted.  Heart size is within normal limits.  There is a question    of slight increased prominence of the pulmonary vasculature.  This may    reflect mild vascular congestion    and volume overload.  Mild basilar atelectasis is favored.  No new focal    consolidation or pleural effusion is seen.                         =====   CONCLUSION:  See above.           LOCATION:  [facility]                       Dictated by (CST): Andrea Lewis MD on 10/21/2024 at 8:49 AM        Finalized by (CST): Andrea Lewis MD on 10/21/2024 at 8:50 AM         XR CHEST AP PORTABLE  (CPT=71045)   Final Result   PROCEDURE:  XR CHEST AP PORTABLE  (CPT=71045)       TECHNIQUE:  AP chest radiograph was obtained.       COMPARISON:  [facility] , XR, XR CHEST AP PORTABLE  (CPT=71045), 10/19/2024,    11:07 PM.       INDICATIONS:  dyspnea       PATIENT STATED HISTORY: (As transcribed by Technologist)  Patient offered    no additional history at this time.             FINDINGS:  Stable cardiomegaly, changes of prior CABG and stable dual lead    cardiac pacemaker.  Normal pulmonary vasculature.  Mild atelectasis in the    left lung base noted.  Stable hyperaeration of the lungs.                         =====   CONCLUSION:     1. Mild subsegmental atelectasis within the left lung base noted.    2. Stable hyperaeration of the lungs suspicious for emphysema/COPD.   3. Stable cardiomegaly, stable changes of prior CABG and cardiac    pacemaker.           LOCATION:  [facility]                       Dictated by (CST): Dasha Rees DO on 10/20/2024 at 10:09 AM        Finalized by (CST): Dasha Rees DO on 10/20/2024 at 10:09 AM         XR CHEST AP PORTABLE  (CPT=71045)   Final Result   PROCEDURE:  XR CHEST AP PORTABLE  (CPT=71045)       TECHNIQUE:  AP chest radiograph was obtained.       COMPARISON:  [facility] , XR, XR CHEST AP PORTABLE  (CPT=71045), 11/14/2023,    0:18 AM.  [facility], XR, CHEST PA   LATERAL, 9/19/2008, 8:52 AM.       INDICATIONS:  ICD fired mulitple times       PATIENT STATED HISTORY: (As transcribed by Technologist)  Patient arrives    from home due to ICD going off multiple times. Patient offered no    additional history at this times.             FINDINGS:  Stable left-sided AICD. Cardiac size and pulmonary vasculature    are within normal limits. No pleural effusions. No pneumothorax.  Median    sternotomy approximated by wire sutures.                             =====   CONCLUSION:  No acute pulmonary findings.           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      Hyperkalemia  -- sp lokelma, continue to redose x3 doses total   -- Hold lasix. sp gentle IVFs to promote kaliuresis. Now off. Initial CXR w no evidence of fluid overload. Repeat w mild fluid.   -- Glucose control   -- albuterol per pulm   -- low K diet      MAIA on CKD3  -- suspect hypovolemic from increased home diuretics vs CRS  -- initially appeared on the dry side on exam to me. Lips dry. No edema. CXR without edema. No crackles on  exam. +mild wheezing. Pulm suspects resp distress due to COPD exac and treating w meds/steroids/bipap.   -- gave 1L IVFs. Cr and K improved. Holding lasix. Restart PRN  -- ok to restart gentle diuresis prn  -- recommend holding lisinopril    -- ECHO w EF 15-20% severe diffuse hypokinesis w regional variations, G1DD  -- UA w no RBCs, 30 protein. Urine lytes noted after lasix  -- consider renal US w duplex  -- Strict UOP   -- avoid nephrotoxins and renally dose meds      Anemia/thrombocytopenia  -- %sat 19     Plan:  -- no further IVF today  -- resume Iron infusions at this time.  -- repeat labs in AM.    Brian Cobos MD  [facility]  Nephrology

## 2024-10-26 NOTE — PROGRESS NOTES
10/26/24 0830   Respiratory Score   Consciousness 0   Level of activity 1   Respiratory pattern 0   Breath sounds 0   Cough 0   Respiratory history documented in chart 0   Surgery history this admission 1   Chest xray 2   ABG / Pulse ox 0   Asthma patients peak flow 0   Heart Rate 0   Trauma 0   Hemoglobin 1   Adult Respiratory Score Calculation 5     Based on respiratory protocol patient duoneb changed to Q4 PRN

## 2024-10-26 NOTE — PROCEDURES
Defibrillator Generator Change      History:  79 year old male with an ischemic cardiomyopathy, class 3 chornic systolic congestive heart failure IVCD with , s/p dual chamber ICD who presents for an upgrade to a BiVICD. The risks (including, but not limited to, hematoma, infection, pneumothorax, tamponade, renal failure from IV dye, damage to any existing leads, myocardial infarction, stroke, and death), benefits (prevention of sudden death and benefits of cardiac resynchronization), and alternatives (no device) of the procedure were discussed. The patient understands and agrees to proceed.    Procedure:  The patient was brought to the electrophysiology laboratory in a fasting and nonsedated state. The left chest was prepped and draped in the usual sterile fashion. Ancef was given for antibiotic prophylaxis. 175mcg of Fentanyl and 7mg of Versed were administered by certified nursing staff and supervised directly by me from 14:55 to 18:52. 1% lidocaine was used for skin anesthesia. An incision was made over the old device below the left clavicle with a scalpel and the plane of dissection was extended until the oldf device was exposed. Access to the left subclavian vein was obtained once using a micropuncture needle via the extrathoracic approach. The CS was cannulated and a balloon occlusive venogram was performed. There was a small lateral branck and a posterolateral branch. Attempts to place 2 different leads in the lateral branch could not get the lead far enough distally to be secure despite using inner guides and stiffer wires. The lead was placed in the posterolateral branch, but this was quite low nad went to the apex. At sites more basal, the BiV paced QRS was very wide around 190ms even with trying different VV timings.     It was decided to try a LBBAP lead. The RV lead was placed through a steerable sheath and later attempts with a fixed sheath and was used to map the RV septum for a location where  paced complexes had a notch on the downstroke in V1, discordant aVR and AVL, and lead 2 more positive than lead 3. I could not find a site with a good discordant aVR and AVL. This was generally about 1.5cm from the His in the RITTER position. At a suitable location, the lead was deployed. However, I could not get the lead to bore deep into the septum. This was despite multiple locations. I asked Mario to come and tried their pacing lead, which has a stylet. Using their system, with a yellow stiff stylet, I still could not bore the lead into the septum. Attempts to get back to the CS showed that the lateral branch had a dissection. At this point, the patient was no longer tolerating the procedure well from a respiratory standpoint (had to be given narcan to reverse some sedation). It was decided to just change the generator. A figure 8 suture was placed around the sheath and it was removed. The pocket was irrigated with antibiotic solution. The leads were connected to the new pulse generator in an antibiotic pouch and placed in the pocket. Neither Medtronic or [employer] had a dual chamber ICD that would accommodate a DF1 lead. Therefore a CRT-D generator was used and the LV port was plugged. The pocket was closed in layers with 2-0 Vicryl for the deep fascia and 4-0 Vicryl for the skin.    Device data:           Model Number Serial Number Sensing(mV) Impedance Pacing   Generator Medtronic [id number]      RA Medtronic [id number] 1.5 361 1.25V @ 0.4ms   RV Medtronic [id number] P 361 1.5V @ 0.4ms   Old generator  Medtronic [id number]          Conclusions:  Defibrillator generator change  Adequate RA and RV sensing and pacing thresholds  Unable to upgrade to CRT-D  Defibrillation threshold testing was deferred      Angel Pedroza MD  Clinical Cardiac Electrophysiology  [facility]

## 2024-10-26 NOTE — PLAN OF CARE
More awake, taking in PO medications, water without difficulty. Vital signs are stable, Incision site without signs and symptoms of complications.

## 2024-10-26 NOTE — PLAN OF CARE
Assumed care of patient at 0700. Pt A/Ox 4. O2 sats maintained on room air. A paced/NSR with BBB on tele. Last BM 10/22 per pt. Voiding without difficulty. Pt reports no pain. Pt up standby assist, using walker due to feeling weak. Pt and family updated on plan of care. Care needs met. Bed in lowest position, Call light within reach. Bed alarm on. Left chest site clean/dry, no hematoma. Mepilex in place. Right groin open to air, soft with no hematoma.     POC: PT/OT eval, IV antibiotic, O2 walk, nebs    Pt ambulated in halls on room air and became very SOB at 150ft, needed to sit in chair for a few minutes to recover, 2L applied for comfort. Pt ambulated back to room on room air, O2 sat 91%.     Pt ambulated again in halls, reports SOB, needed to sit x3 during. O2 sat 88-91% on room air.     Problem: Patient/Family Goals  Goal: Patient/Family Long Term Goal  Description: Patient's Long Term Goal: to go home    Interventions:  - MD rounding, medication management, monitor labs, angiogram, ICD upgrade  - See additional Care Plan goals for specific interventions  Outcome: Progressing  Goal: Patient/Family Short Term Goal  Description: Patient's Short Term Goal: remain pain free    Interventions:   - pain assessments q4, pain meds PRN  - See additional Care Plan goals for specific interventions  Outcome: Progressing     Problem: CARDIOVASCULAR - ADULT  Goal: Maintains optimal cardiac output and hemodynamic stability  Description: INTERVENTIONS:  - Monitor vital signs, rhythm, and trends  - Monitor for bleeding, hypotension and signs of decreased cardiac output  - Evaluate effectiveness of vasoactive medications to optimize hemodynamic stability  - Monitor arterial and/or venous puncture sites for bleeding and/or hematoma  - Assess quality of pulses, skin color and temperature  - Assess for signs of decreased coronary artery perfusion - ex. Angina  - Evaluate fluid balance, assess for edema, trend weights  Outcome:  Progressing  Goal: Absence of cardiac arrhythmias or at baseline  Description: INTERVENTIONS:  - Continuous cardiac monitoring, monitor vital signs, obtain 12 lead EKG if indicated  - Evaluate effectiveness of antiarrhythmic and heart rate control medications as ordered  - Initiate emergency measures for life threatening arrhythmias  - Monitor electrolytes and administer replacement therapy as ordered  Outcome: Progressing     Problem: RESPIRATORY - ADULT  Goal: Achieves optimal ventilation and oxygenation  Description: INTERVENTIONS:  - Assess for changes in respiratory status  - Assess for changes in mentation and behavior  - Position to facilitate oxygenation and minimize respiratory effort  - Oxygen supplementation based on oxygen saturation or ABGs  - Provide Smoking Cessation handout, if applicable  - Encourage broncho-pulmonary hygiene including cough, deep breathe, Incentive Spirometry  - Assess the need for suctioning and perform as needed  - Assess and instruct to report SOB or any respiratory difficulty  - Respiratory Therapy support as indicated  - Manage/alleviate anxiety  - Monitor for signs/symptoms of CO2 retention  Outcome: Progressing

## 2024-10-26 NOTE — PROGRESS NOTES
[facility]   part of [facility]     Cardiology Progress Note        Mingo White Patient Status:  Inpatient    1945 MRN [id number]   Location [facility] 2NE-A Attending Karina Rothman, DO   Hosp Day # 6 PCP Daren Espino MD       Subjective/ROS:  No acute issues or problems overnight. Unable to upgrade to crt-d due to anatomy. Denies sob. No chest pain. Main complaint now is deconditioning     Objective:  /54   Pulse 60   Temp 98.6 °F (37 °C) (Axillary)   Resp 16   Wt 136 lb 3.9 oz (61.8 kg)   SpO2 100%   BMI 21.99 kg/m²     Temp (24hrs), Av.6 °F (36.4 °C), Min:97.1 °F (36.2 °C), Max:98.6 °F (37 °C)      Tele  Intermittent pacing, pvcs    Intake/Output:    Intake/Output Summary (Last 24 hours) at 10/26/2024 0722  Last data filed at 10/26/2024 0523  Gross per 24 hour   Intake 500 ml   Output 200 ml   Net 300 ml       No data found.     Physical Exam:    Gen: alert, oriented x 3, NAD  Heent: pupils equal, reactive. Mucous membranes moist.   Neck: no jvd  Cardiac: regular rate and rhythm, normal S1,S2  Lungs: clear posteriorly   Abd: soft, NT/ND +bs  Ext: no edema  Skin: Warm, dry  Neuro: No focal deficits    Labs:  Lab Results   Component Value Date    WBC 11.8 10/26/2024    HGB 10.3 10/26/2024    HCT 31.2 10/26/2024    .0 10/26/2024       CXR: Reviewed. Improvement.  Continued but decrease in mixed interstitial and airspace opacities in the lung, now primarily at the right and left lung base.  No new or worsening process.  Continued hyperinflation.  Stable cardiac enlargement.  Stable   pacemaker. No sign of pneumothorax or CHF.     Medications:     atorvastatin  40 mg Oral Nightly    insulin degludec  10 Units Subcutaneous Daily    amiodarone  400 mg Oral TID    predniSONE  40 mg Oral Daily with breakfast    sodium ferric gluconate  125 mg Intravenous Daily    ipratropium-albuterol  3 mL Nebulization 4 times per day    heparin  5,000 Units Subcutaneous Q8H JENNIFFER     aspirin  81 mg Oral Nightly    carvedilol  12.5 mg Oral BID    clopidogrel  75 mg Oral Daily    pantoprazole  40 mg Oral QAM AC    insulin aspart  2-10 Units Subcutaneous TID AC and HS    budesonide  0.5 mg Nebulization 2 times daily    arformoterol  15 mcg Nebulization 2 times daily         Assessment:    Symptomatic sustained VT requiring mutliple ICD shocks- reason for presentation- no recurrent runs on amio  Per [facility] EP.  Cad hx remote cabg 2006 (lima to lad, svg to rca)  S/p ptca/kathy native om 10/24. Both grafts patent  Asa/plavix  ICM- ef 15-20%- compensated on exam   Dual chamber icd - unsuccessful attempt to upgrade to crt-d 10/25  On Coreg.  ACE stopped due to hyperkalemia on admission. Issues with high K on mra in past as well.   Acute hypercapnic resp failure , underlying copd with acute exacerbation  Nebs/steroids per pulm  MAIA/CKD (baseline cr 1.9-2.2)  Felt dry on admission  cr now overall stable, at baseline. Per renal (duly)  Hyperkalemia on admission (6.1)  ACE- stopped, Appears from notes was taken off  MRA in past for same reason.  Chronic anemia, tcp (10.9/111k)  DM2- on metformin at home    Plan:     Continue coreg, dapt, statin.   Use of ace-I, mra limited by recurrent hyperkalemia. Consider addition of sglt-2 (is on metformin as well)  Amio per Dr Pedroza  PT/OT  Discharge planning.         Renae Raymundo, HARSHIL  [phone]

## 2024-10-26 NOTE — PROGRESS NOTES
[facility]  Hospitalist Progress Note                                                                     [facility]   part of [facility]  6/5/1945    SUBJECTIVE:  Seen and examined at bedside.  Feels very weak today.  No overnight events.  Yesterday postoperatively, received Narcan, required some oxygen overnight.  Status post defibrillator generator replacement.  Per notes, unable to upgrade the pacemaker given anatomy      OBJECTIVE:  Temp:  [97.5 °F (36.4 °C)-98.6 °F (37 °C)] 97.8 °F (36.6 °C)  Pulse:  [60-64] 64  Resp:  [16-20] 20  BP: (100-112)/(45-54) 107/45  SpO2:  [93 %-100 %] 93 %  Exam  Gen: No acute distress, alert and oriented x3, no focal neurologic deficits, pale and weak  Pulm: Scattered wheezing, intermittent rattles noted, productive cough, off O2  CV: Heart with regular rate and rhythm, no murmur.  Normal PMI.    Abd: Abdomen soft, nontender, nondistended, no organomegaly, bowel sounds present  MSK: Full range of motion in extremities, no clubbing, no cyanosis, gait not assessed  Skin: no rashes or lesions    Labs:   Recent Labs   Lab 10/19/24  2243 10/21/24  0424 10/22/24  0438 10/23/24  0509 10/24/24  0604 10/26/24  0607   WBC 7.5 11.3* 12.0* 10.3 10.9 11.8*   HGB 12.8* 11.6* 11.1* 11.2* 10.9* 10.3*   MCV 87.3 87.9 89.9 88.3 87.6 90.7   .0* 109.0* 111.0* 108.0* 111.0* 111.0*   INR 1.01  --   --   --   --   --        Recent Labs   Lab 10/22/24  0438 10/22/24  0824 10/23/24  0509 10/24/24  0605 10/25/24  0605 10/26/24  0607   * 132* 136 137 140 140   K 4.8 4.4 4.1 4.0 4.0 4.2    102 105 107 111 111   CO2 14.0* 22.0 23.0 23.0 22.0 21.0   BUN 43* 60* 69* 70* 77* 77*   CREATSERUM 2.32* 2.29* 2.16* 2.17* 1.95* 2.07*   CA 9.2 9.4 9.3 9.3 8.8 8.7   MG 2.0  --  2.4 2.4 2.6 2.6   PHOS 5.5*  --  5.0 4.9 4.9 5.2*   * 207* 186* 185* 109* 128*       Recent Labs   Lab 10/19/24  6434 10/20/24  0042  10/21/24  0424 10/22/24  0438 10/23/24  0509 10/24/24  0605 10/25/24  0605 10/26/24  0607   ALT 14  --  10  --   --   --   --   --    AST 15  --  10  --   --   --   --   --    ALB 4.6   < > 3.7 3.5 3.7 3.6 3.3 3.2    < > = values in this interval not displayed.       Recent Labs   Lab 10/25/24  0530 10/25/24  1132 10/25/24  2125 10/26/24  0515 10/26/24  1048   PGLU 115* 123* 129* 139* 129*       Meds:   Scheduled:    [START ON 10/27/2024] amiodarone  200 mg Oral BID with meals    atorvastatin  40 mg Oral Nightly    insulin degludec  10 Units Subcutaneous Daily    amiodarone  400 mg Oral TID    predniSONE  40 mg Oral Daily with breakfast    heparin  5,000 Units Subcutaneous Q8H JENNIFFER    aspirin  81 mg Oral Nightly    carvedilol  12.5 mg Oral BID    clopidogrel  75 mg Oral Daily    pantoprazole  40 mg Oral QAM AC    insulin aspart  2-10 Units Subcutaneous TID AC and HS    budesonide  0.5 mg Nebulization 2 times daily    arformoterol  15 mcg Nebulization 2 times daily     Continuous Infusions:         PRN:   ipratropium-albuterol    ondansetron    acetaminophen **OR** HYDROcodone-acetaminophen **OR** HYDROcodone-acetaminophen    metoclopramide    albuterol    fentaNYL    Assessment/Plan:  Principal Problem:    Ventricular tachyarrhythmia (HCC)  Active Problems:    V-tach (HCC)    Ischemic cardiomyopathy with implantable cardioverter-defibrillator (ICD)    79 year old male with PMH sig for ischemic cardiomyopathy with an EF of 25%, history of VT status post AICD, chronic systolic heart failure, AAA status post EVAR, diabetes mellitus type 2, dyslipidemia, left renal artery stenosis presented after his ICD fired 4 times.      Ventricular tachycardia with appropriate shocks  S/p DCICD  - cont amio infusion, wean per cardiology  - coreg  - echo reviewed  - Evaluated by electrophysiologist, status post defibrillator generator replacement 10/25, unable to upgrade to CRT given anatomy per EP notes  - further management per  cardiology      Acute Hypercapnic Respiratory failure from acute COPD exacerbation-improving  - pulmonology following  -Off BiPAP,  -IV steroids tapered off to p.o.  - budesonide/brovana     ICMP   Chronic systolic heart failure  - cardiology following  - further diuresis per cardiology/nephrology   - daily weights, I/Os  - coreg  -2D echo with EF 15-20% with severe wall motion abnormality, see echo report for details,-this was previously noted on echo  - maximize GDMT as bp permits  - Given ectopy and multiple other risk factors including smoking, ischemic evaluation is warranted.   -Status post cardiac catheterization 10/24-status post PCI/NORRIS to OM    MAIA on CKD with hyperkalemia  - s/p lokelma x3 doses per nephrology   - hold lisinopril   - I/Os, UOP  - Creatinine improving  - nephrology following, case discussed     DM2 with hyperglycemia  - ISS/accucheks  - Worsened given steroids, BG trend reviewed, within range  - Continue Tresiba 10 units daily     Hyperlipidemia  - statin     AAA s/p EVAR  - stable     Dispo: Pending clinical status, increase mobility    FEN: regular diet, PT/OT-to reevaluate  Proph: SCDs, heparin  Code status: Full code     Karina Rothman DO  [facility]  [facility]

## 2024-10-27 LAB
ALBUMIN SERPL-MCNC: 3 G/DL (ref 3.2–4.8)
ANION GAP SERPL CALC-SCNC: 6 MMOL/L (ref 0–18)
BASOPHILS # BLD AUTO: 0.01 X10(3) UL (ref 0–0.2)
BASOPHILS NFR BLD AUTO: 0.1 %
BUN BLD-MCNC: 86 MG/DL (ref 9–23)
CALCIUM BLD-MCNC: 8.9 MG/DL (ref 8.7–10.4)
CHLORIDE SERPL-SCNC: 110 MMOL/L (ref 98–112)
CO2 SERPL-SCNC: 22 MMOL/L (ref 21–32)
CREAT BLD-MCNC: 2.17 MG/DL
EGFRCR SERPLBLD CKD-EPI 2021: 30 ML/MIN/1.73M2 (ref 60–?)
EOSINOPHIL # BLD AUTO: 0 X10(3) UL (ref 0–0.7)
EOSINOPHIL NFR BLD AUTO: 0 %
ERYTHROCYTE [DISTWIDTH] IN BLOOD BY AUTOMATED COUNT: 14.5 %
GLUCOSE BLD-MCNC: 141 MG/DL (ref 70–99)
GLUCOSE BLD-MCNC: 150 MG/DL (ref 70–99)
GLUCOSE BLD-MCNC: 154 MG/DL (ref 70–99)
GLUCOSE BLD-MCNC: 198 MG/DL (ref 70–99)
GLUCOSE BLD-MCNC: 236 MG/DL (ref 70–99)
HCT VFR BLD AUTO: 27.6 %
HGB BLD-MCNC: 9.8 G/DL
IMM GRANULOCYTES # BLD AUTO: 0.25 X10(3) UL (ref 0–1)
IMM GRANULOCYTES NFR BLD: 2.5 %
LYMPHOCYTES # BLD AUTO: 0.5 X10(3) UL (ref 1–4)
LYMPHOCYTES NFR BLD AUTO: 5.1 %
MAGNESIUM SERPL-MCNC: 2.7 MG/DL (ref 1.6–2.6)
MCH RBC QN AUTO: 30.4 PG (ref 26–34)
MCHC RBC AUTO-ENTMCNC: 35.5 G/DL (ref 31–37)
MCV RBC AUTO: 85.7 FL
MONOCYTES # BLD AUTO: 0.73 X10(3) UL (ref 0.1–1)
MONOCYTES NFR BLD AUTO: 7.4 %
NEUTROPHILS # BLD AUTO: 8.36 X10 (3) UL (ref 1.5–7.7)
NEUTROPHILS # BLD AUTO: 8.36 X10(3) UL (ref 1.5–7.7)
NEUTROPHILS NFR BLD AUTO: 84.9 %
NT-PROBNP SERPL-MCNC: ABNORMAL PG/ML (ref ?–450)
OSMOLALITY SERPL CALC.SUM OF ELEC: 315 MOSM/KG (ref 275–295)
PHOSPHATE SERPL-MCNC: 4.5 MG/DL (ref 2.4–5.1)
PLATELET # BLD AUTO: 102 10(3)UL (ref 150–450)
POTASSIUM SERPL-SCNC: 4 MMOL/L (ref 3.5–5.1)
RBC # BLD AUTO: 3.22 X10(6)UL
SODIUM SERPL-SCNC: 138 MMOL/L (ref 136–145)
WBC # BLD AUTO: 9.9 X10(3) UL (ref 4–11)

## 2024-10-27 PROCEDURE — 80069 RENAL FUNCTION PANEL: CPT | Performed by: INTERNAL MEDICINE

## 2024-10-27 PROCEDURE — 94640 AIRWAY INHALATION TREATMENT: CPT

## 2024-10-27 PROCEDURE — 85025 COMPLETE CBC W/AUTO DIFF WBC: CPT | Performed by: STUDENT IN AN ORGANIZED HEALTH CARE EDUCATION/TRAINING PROGRAM

## 2024-10-27 PROCEDURE — 82962 GLUCOSE BLOOD TEST: CPT

## 2024-10-27 PROCEDURE — 83735 ASSAY OF MAGNESIUM: CPT | Performed by: INTERNAL MEDICINE

## 2024-10-27 PROCEDURE — 83880 ASSAY OF NATRIURETIC PEPTIDE: CPT | Performed by: INTERNAL MEDICINE

## 2024-10-27 RX ORDER — POLYETHYLENE GLYCOL 3350 17 G/17G
17 POWDER, FOR SOLUTION ORAL DAILY PRN
Status: DISCONTINUED | OUTPATIENT
Start: 2024-10-27 | End: 2024-10-28

## 2024-10-27 RX ORDER — FUROSEMIDE 20 MG/1
20 TABLET ORAL DAILY
Status: DISCONTINUED | OUTPATIENT
Start: 2024-10-27 | End: 2024-10-28

## 2024-10-27 RX ORDER — DOCUSATE SODIUM 100 MG/1
100 CAPSULE, LIQUID FILLED ORAL 2 TIMES DAILY
Status: DISCONTINUED | OUTPATIENT
Start: 2024-10-27 | End: 2024-10-28

## 2024-10-27 RX ORDER — BISACODYL 10 MG
10 SUPPOSITORY, RECTAL RECTAL
Status: DISCONTINUED | OUTPATIENT
Start: 2024-10-27 | End: 2024-10-28

## 2024-10-27 RX ORDER — PANTOPRAZOLE SODIUM 40 MG/1
40 TABLET, DELAYED RELEASE ORAL
Qty: 90 TABLET | Refills: 0 | Status: SHIPPED | OUTPATIENT
Start: 2024-10-28 | End: 2024-10-28

## 2024-10-27 NOTE — PLAN OF CARE
Assumed care of p/t at shift change. Alert and oriented x4. Maintains O2 sat on RA. AV paced with BBB on tele monitor. No complaints of pain. Continent of bladder and bowel. Last bowel movement on 10/22/2024, new scheduled laxative ordered help GI movement. Up x1 with a walker. Personal possessions and call light within reach. Updated on plan of care.       Problem: Patient/Family Goals  Goal: Patient/Family Long Term Goal  Description: Patient's Long Term Goal: to go home    Interventions:  - MD rounding, medication management, monitor labs, angiogram, ICD upgrade  - See additional Care Plan goals for specific interventions  Outcome: Progressing  Goal: Patient/Family Short Term Goal  Description: Patient's Short Term Goal: remain pain free    Interventions:   - pain assessments q4, pain meds PRN  - See additional Care Plan goals for specific interventions  Outcome: Progressing     Problem: CARDIOVASCULAR - ADULT  Goal: Maintains optimal cardiac output and hemodynamic stability  Description: INTERVENTIONS:  - Monitor vital signs, rhythm, and trends  - Monitor for bleeding, hypotension and signs of decreased cardiac output  - Evaluate effectiveness of vasoactive medications to optimize hemodynamic stability  - Monitor arterial and/or venous puncture sites for bleeding and/or hematoma  - Assess quality of pulses, skin color and temperature  - Assess for signs of decreased coronary artery perfusion - ex. Angina  - Evaluate fluid balance, assess for edema, trend weights  Outcome: Progressing  Goal: Absence of cardiac arrhythmias or at baseline  Description: INTERVENTIONS:  - Continuous cardiac monitoring, monitor vital signs, obtain 12 lead EKG if indicated  - Evaluate effectiveness of antiarrhythmic and heart rate control medications as ordered  - Initiate emergency measures for life threatening arrhythmias  - Monitor electrolytes and administer replacement therapy as ordered  Outcome: Progressing     Problem:  RESPIRATORY - ADULT  Goal: Achieves optimal ventilation and oxygenation  Description: INTERVENTIONS:  - Assess for changes in respiratory status  - Assess for changes in mentation and behavior  - Position to facilitate oxygenation and minimize respiratory effort  - Oxygen supplementation based on oxygen saturation or ABGs  - Provide Smoking Cessation handout, if applicable  - Encourage broncho-pulmonary hygiene including cough, deep breathe, Incentive Spirometry  - Assess the need for suctioning and perform as needed  - Assess and instruct to report SOB or any respiratory difficulty  - Respiratory Therapy support as indicated  - Manage/alleviate anxiety  - Monitor for signs/symptoms of CO2 retention  Outcome: Progressing

## 2024-10-27 NOTE — PROGRESS NOTES
10/27/24 1130   Mobility   O2 walk? Yes   SPO2% on Room Air at Rest 96   SPO2% Ambulation on Room Air 90     P/t walked a considerable distance and had to stop and sit down midway through the O2 walk. Short of breath, general weakness in his lower extremities.

## 2024-10-27 NOTE — PROGRESS NOTES
[facility]   part of [facility]    Nephrology Progress Note    Mingo White Attending:  Rehana Sanford*     Cc: christine, hyperkalemia    SUBJECTIVE     Feeling weak  Having difficulty with ambulation given SOB.    PHYSICAL EXAM   Vital signs: /51 (BP Location: Right arm)   Pulse 60   Temp 97.7 °F (36.5 °C) (Oral)   Resp 19   Wt 135 lb 12.9 oz (61.6 kg)   SpO2 96%   BMI 21.92 kg/m²   Temp (24hrs), Av.8 °F (36.6 °C), Min:97.7 °F (36.5 °C), Max:98 °F (36.7 °C)       Intake/Output Summary (Last 24 hours) at 10/27/2024 0847  Last data filed at 10/27/2024 0843  Gross per 24 hour   Intake 1440 ml   Output 1280 ml   Net 160 ml     Wt Readings from Last 3 Encounters:   10/27/24 135 lb 12.9 oz (61.6 kg)   11/15/23 113 lb 1.6 oz (51.3 kg)   22 124 lb (56.2 kg)     General: NAD  HEENT: NCAT, EOMI, MMM  Neck: Supple   Cardiac: Regular rate and rhythm   Lungs: CTAB  Abdomen: Soft, non-tender, nondistended   Extremities: No edema  Neurologic: No asterxis  Skin: Warm and dry, no rashes     MEDS     Current Facility-Administered Medications   Medication Dose Route Frequency    amiodarone (Pacerone) tab 200 mg  200 mg Oral BID with meals    ipratropium-albuterol (Duoneb) 0.5-2.5 (3) MG/3ML inhalation solution 3 mL  3 mL Nebulization Q4H PRN    ondansetron (Zofran) 4 MG/2ML injection 4 mg  4 mg Intravenous Q6H PRN    atorvastatin (Lipitor) tab 40 mg  40 mg Oral Nightly    insulin degludec (Tresiba) 100 units/mL flextouch 10 Units  10 Units Subcutaneous Daily    heparin (Porcine) 5000 UNIT/ML injection 5,000 Units  5,000 Units Subcutaneous Q8H JENNIFFER    acetaminophen (Tylenol) tab 650 mg  650 mg Oral Q4H PRN    Or    HYDROcodone-acetaminophen (Norco) 5-325 MG per tab 1 tablet  1 tablet Oral Q4H PRN    Or    HYDROcodone-acetaminophen (Norco) 5-325 MG per tab 2 tablet  2 tablet Oral Q4H PRN    metoclopramide (Reglan) 5 mg/mL injection 5 mg  5 mg Intravenous Q8H PRN    albuterol (Ventolin HFA) 108 (90  Base) MCG/ACT inhaler 2 puff  2 puff Inhalation Q4H PRN    aspirin DR tab 81 mg  81 mg Oral Nightly    carvedilol (Coreg) tab 12.5 mg  12.5 mg Oral BID    clopidogrel (Plavix) tab 75 mg  75 mg Oral Daily    pantoprazole (Protonix) DR tab 40 mg  40 mg Oral QAM AC    insulin aspart (NovoLOG) 100 Units/mL FlexPen 2-10 Units  2-10 Units Subcutaneous TID AC and HS    budesonide (Pulmicort) 0.5 MG/2ML nebulizer suspension 0.5 mg  0.5 mg Nebulization 2 times daily    arformoterol (Brovana) 15 MCG/2ML nebulizer solution 15 mcg  15 mcg Nebulization 2 times daily    fentaNYL (Sublimaze) 50 mcg/mL injection 25 mcg  25 mcg Intravenous Q5 Min PRN       LABS     Lab Results   Component Value Date    WBC 9.9 10/27/2024    HGB 9.8 10/27/2024    HCT 27.6 10/27/2024    .0 10/27/2024    CREATSERUM 2.17 10/27/2024    BUN 86 10/27/2024     10/27/2024    K 4.0 10/27/2024     10/27/2024    CO2 22.0 10/27/2024     10/27/2024    CA 8.9 10/27/2024    ALB 3.0 10/27/2024    MG 2.7 10/27/2024    PHOS 4.5 10/27/2024    PGLU 150 10/27/2024       IMAGING   All imaging studies personally reviewed.    XR CHEST AP PORTABLE  (CPT=71045)   Final Result   PROCEDURE:  XR CHEST AP PORTABLE  (CPT=71045)       TECHNIQUE:  AP chest radiograph was obtained.       COMPARISON:  [facility] , XR, XR CHEST AP PORTABLE  (CPT=71045), 10/21/2024,    8:30 AM.       INDICATIONS:  s/p ICD       PATIENT STATED HISTORY: (As transcribed by Technologist)  Patient offered    no additional history at this time.                               =====   CONCLUSION:    Improvement.  Continued but decrease in mixed interstitial    and airspace opacities in the lung, now primarily at the right and left    lung base.  No new or worsening process.  Continued hyperinflation.     Stable cardiac enlargement.  Stable    pacemaker.  No sign of pneumothorax or CHF.       LOCATION:  [facility]                       Dictated by (CST): Richardson Carty MD on 10/25/2024 at 11:59  PM        Finalized by (CST): Richardson Carty MD on 10/26/2024 at 0:00 AM         XR CHEST AP PORTABLE  (CPT=71045)   Final Result   PROCEDURE:  XR CHEST AP PORTABLE  (CPT=71045)       TECHNIQUE:  AP chest radiograph was obtained.       COMPARISON:  [facility] , XR, XR CHEST AP PORTABLE  (CPT=71045), 10/20/2024,    9:46 AM.       INDICATIONS:  hypoxic       PATIENT STATED HISTORY: (As transcribed by Technologist)  Patient offered    no additional history at this time.             FINDINGS:  Support devices appear overall stable.  Postsurgical changes of    chest are noted.  Heart size is within normal limits.  There is a question    of slight increased prominence of the pulmonary vasculature.  This may    reflect mild vascular congestion    and volume overload.  Mild basilar atelectasis is favored.  No new focal    consolidation or pleural effusion is seen.                         =====   CONCLUSION:  See above.           LOCATION:  [facility]                       Dictated by (CST): Andrea Lewis MD on 10/21/2024 at 8:49 AM        Finalized by (CST): Andrea Lewis MD on 10/21/2024 at 8:50 AM         XR CHEST AP PORTABLE  (CPT=71045)   Final Result   PROCEDURE:  XR CHEST AP PORTABLE  (CPT=71045)       TECHNIQUE:  AP chest radiograph was obtained.       COMPARISON:  [facility] , XR, XR CHEST AP PORTABLE  (CPT=71045), 10/19/2024,    11:07 PM.       INDICATIONS:  dyspnea       PATIENT STATED HISTORY: (As transcribed by Technologist)  Patient offered    no additional history at this time.             FINDINGS:  Stable cardiomegaly, changes of prior CABG and stable dual lead    cardiac pacemaker.  Normal pulmonary vasculature.  Mild atelectasis in the    left lung base noted.  Stable hyperaeration of the lungs.                         =====   CONCLUSION:     1. Mild subsegmental atelectasis within the left lung base noted.   2. Stable hyperaeration of the lungs suspicious for emphysema/COPD.   3. Stable cardiomegaly, stable changes of  prior CABG and cardiac    pacemaker.           LOCATION:  [facility]                       Dictated by (CST): Dasha Rees DO on 10/20/2024 at 10:09 AM        Finalized by (CST): Dasha Rees DO on 10/20/2024 at 10:09 AM         XR CHEST AP PORTABLE  (CPT=71045)   Final Result   PROCEDURE:  XR CHEST AP PORTABLE  (CPT=71045)       TECHNIQUE:  AP chest radiograph was obtained.       COMPARISON:  [facility] , XR, XR CHEST AP PORTABLE  (CPT=71045), 11/14/2023,    0:18 AM.  [place], XR, CHEST PA   LATERAL, 9/19/2008, 8:52 AM.       INDICATIONS:  ICD fired mulitple times       PATIENT STATED HISTORY: (As transcribed by Technologist)  Patient arrives    from home due to ICD going off multiple times. Patient offered no    additional history at this times.             FINDINGS:  Stable left-sided AICD. Cardiac size and pulmonary vasculature    are within normal limits. No pleural effusions. No pneumothorax.  Median    sternotomy approximated by wire sutures.                             =====   CONCLUSION:  No acute pulmonary findings.           LOCATION:  [facility]                       Dictated by (CST): Michele Mead MD on 10/19/2024 at 11:30 PM        Finalized by (CST): Michele Mead MD on 10/19/2024 at 11:30 PM         US VENOUS DOPPLER ARM LEFT - DIAG IMG (CPT=93971)    (Results Pending)         ASSESSMENT & PLAN   79 year old male former smoker w ho COPD, CHF, DM, CAD, CKD3-4 (bl Cr 1.9-2.2) who presented to ED w SOB and defirillator firing.      Hyperkalemia  -- sp lokelma  -- Hold lasix. sp gentle IVFs to promote kaliuresis. Now off. Initial CXR w no evidence of fluid overload. Repeat w mild fluid.   -- Glucose control   -- albuterol per pulm   -- low K diet      MAIA on CKD3  -- suspect CRS check BNP  -- ok to restart gentle diuresis prn  -- recommend holding lisinopril    -- ECHO w EF 15-20% severe diffuse hypokinesis w regional variations, G1DD  -- UA w no RBCs, 30 protein. Urine lytes noted after lasix  --  consider renal US w duplex  -- Strict UOP   -- avoid nephrotoxins and renally dose meds      Anemia/thrombocytopenia  -- %sat 19     Plan:  -- Check BNP restart diuresis.  -- resume Iron infusions at this time.  -- repeat labs in AM.    Brian Cobos MD  [facility]  Nephrology

## 2024-10-27 NOTE — PROGRESS NOTES
[facility]  Hospitalist Progress Note                                                                     [facility]   [facility]        [facility]  6/5/1945    SUBJECTIVE:  Seen and examined at bedside.  Stronger today. LUE swelling more than his baseline. No further episode of coughing.      OBJECTIVE:  Temp:  [97.7 °F (36.5 °C)-98 °F (36.7 °C)] 97.7 °F (36.5 °C)  Pulse:  [60-64] 60  Resp:  [16-20] 19  BP: ()/(45-51) 112/51  SpO2:  [93 %-97 %] 96 %  Exam  Gen: No acute distress, alert and oriented x3, no focal neurologic deficits, pale and weak  Pulm: Scattered wheezing, intermittent rattles noted, productive cough, off O2  CV: Heart with regular rate and rhythm, no murmur.  Normal PMI.    Abd: Abdomen soft, nontender, nondistended, no organomegaly, bowel sounds present  MSK: Full range of motion in extremities, no clubbing, no cyanosis, gait not assessed  Skin: no rashes or lesions    Labs:   Recent Labs   Lab 10/22/24  0438 10/23/24  0509 10/24/24  0604 10/26/24  0607 10/27/24  0742   WBC 12.0* 10.3 10.9 11.8* 9.9   HGB 11.1* 11.2* 10.9* 10.3* 9.8*   MCV 89.9 88.3 87.6 90.7 85.7   .0* 108.0* 111.0* 111.0* 102.0*       Recent Labs   Lab 10/23/24  0509 10/24/24  0605 10/25/24  0605 10/26/24  0607 10/27/24  0742    137 140 140 138   K 4.1 4.0 4.0 4.2 4.0    107 111 111 110   CO2 23.0 23.0 22.0 21.0 22.0   BUN 69* 70* 77* 77* 86*   CREATSERUM 2.16* 2.17* 1.95* 2.07* 2.17*   CA 9.3 9.3 8.8 8.7 8.9   MG 2.4 2.4 2.6 2.6 2.7*   PHOS 5.0 4.9 4.9 5.2* 4.5   * 185* 109* 128* 141*       Recent Labs   Lab 10/21/24  0424 10/22/24  0438 10/23/24  0509 10/24/24  0605 10/25/24  0605 10/26/24  0607 10/27/24  0742   ALT 10  --   --   --   --   --   --    AST 10  --   --   --   --   --   --    ALB 3.7   < > 3.7 3.6 3.3 3.2 3.0*    < > = values in this interval not displayed.       Recent Labs   Lab 10/26/24  0515  10/26/24  1048 10/26/24  1716 10/26/24  2102 10/27/24  0542   PGLU 139* 129* 151* 181* 150*       Meds:   Scheduled:    docusate sodium  100 mg Oral BID    amiodarone  200 mg Oral BID with meals    atorvastatin  40 mg Oral Nightly    insulin degludec  10 Units Subcutaneous Daily    heparin  5,000 Units Subcutaneous Q8H JENNIFFER    aspirin  81 mg Oral Nightly    carvedilol  12.5 mg Oral BID    clopidogrel  75 mg Oral Daily    pantoprazole  40 mg Oral QAM AC    insulin aspart  2-10 Units Subcutaneous TID AC and HS    budesonide  0.5 mg Nebulization 2 times daily    arformoterol  15 mcg Nebulization 2 times daily     Continuous Infusions:         PRN:   polyethylene glycol (PEG 3350)    bisacodyl    ipratropium-albuterol    ondansetron    acetaminophen **OR** HYDROcodone-acetaminophen **OR** HYDROcodone-acetaminophen    metoclopramide    albuterol    fentaNYL    Assessment/Plan:  Principal Problem:    Ventricular tachyarrhythmia (HCC)  Active Problems:    V-tach (HCC)    Ischemic cardiomyopathy with implantable cardioverter-defibrillator (ICD)    79 year old male with PMH sig for ischemic cardiomyopathy with an EF of 25%, history of VT status post AICD, chronic systolic heart failure, AAA status post EVAR, diabetes mellitus type 2, dyslipidemia, left renal artery stenosis presented after his ICD fired 4 times.      Ventricular tachycardia with appropriate shocks  S/p DCICD  - cont amio infusion, wean per cardiology  - coreg  - echo reviewed  - Evaluated by electrophysiologist, status post defibrillator generator replacement 10/25, unable to upgrade to CRT given anatomy per EP notes  - further management per cardiology      Left upper extremity swelling  - Acute on chronic  - In the setting of recent pacemaker surgery, plan for left upper extremity Dopplers  - IV diuresis per nephrology    Acute Hypercapnic Respiratory failure from acute COPD exacerbation-resolved  - pulmonology following  -Off BiPAP,  -IV steroids tapered  off to p.o.  - budesonide/brovana     ICMP   Chronic systolic heart failure  - cardiology following  - further diuresis per cardiology/nephrology   - daily weights, I/Os  - coreg  -2D echo with EF 15-20% with severe wall motion abnormality, see echo report for details,-this was previously noted on echo  - maximize GDMT as bp permits  - Given ectopy and multiple other risk factors including smoking, ischemic evaluation is warranted.   -Status post cardiac catheterization 10/24-status post PCI/NORRIS to OM    MAIA on CKD with hyperkalemia  - s/p lokelma x3 doses per nephrology   - hold lisinopril   - I/Os, UOP  - Creatinine  - proBNP elevated  - Will defer diuresis to nephrology  - nephrology following, case discussed     DM2 with hyperglycemia  - ISS/accucheks  - Worsened given steroids, BG trend reviewed, within range  - Continue Tresiba 10 units daily     Hyperlipidemia  - statin     AAA s/p EVAR  - stable     Dispo: Pending clinical status, increase mobility    FEN: regular diet, PT/OT-to reevaluate  Proph: SCDs, heparin  Code status: Full code     Karina Rothman DO  Hospitalist  [facility]

## 2024-10-27 NOTE — PROGRESS NOTES
Progress Note  Mingo White Patient Status:  Inpatient    1945 MRN [id number]   Location [facility] 2NE-A Attending Karina Rothman, DO   Hosp Day # 7 PCP Daren Espino MD     Subjective:  Feels well. Ambulated twice yesterday without chest pain or dyspnea.  Notes L arm swelling that has been ongoing since approximately Thursday    Objective:  Physical Exam:   BP 98/46 (BP Location: Right arm)   Pulse 60   Temp 98 °F (36.7 °C) (Oral)   Resp 18   Wt 135 lb 12.9 oz (61.6 kg)   SpO2 94%   BMI 21.92 kg/m²   Temp (24hrs), Av.8 °F (36.6 °C), Min:97.5 °F (36.4 °C), Max:98 °F (36.7 °C)       Intake/Output Summary (Last 24 hours) at 10/27/2024 05  Last data filed at 10/27/2024 0140  Gross per 24 hour   Intake 1560 ml   Output 980 ml   Net 580 ml     Wt Readings from Last 3 Encounters:   10/27/24 135 lb 12.9 oz (61.6 kg)   11/15/23 113 lb 1.6 oz (51.3 kg)   22 124 lb (56.2 kg)     Telemetry: Paced  General: Alert and oriented in no apparent distress lying comfortably in bed flat  HEENT: No focal deficits.  Neck: No JVD, carotids 2+ no bruits.  Cardiac: Regular rate and rhythm, S1, S2 normal, rub or gallop.  Lungs: Clear without wheezes, rales, rhonchi or dullness.  Normal excursions and effort.  Abdomen: Soft, non-tender.   Extremities: Without clubbing, cyanosis or edema.  Peripheral pulses are 2+. L arm edema.  Neurologic: Alert and oriented, normal affect.  Skin: Warm and dry. L chest wall mepilex in place      Intake/Output:    Intake/Output Summary (Last 24 hours) at 10/27/2024 05  Last data filed at 10/27/2024 0140  Gross per 24 hour   Intake 1560 ml   Output 980 ml   Net 580 ml       Last 3 Weights   10/27/24 0140 135 lb 12.9 oz (61.6 kg)   10/23/24 0500 136 lb 3.9 oz (61.8 kg)   10/20/24 1208 106 lb 11.2 oz (48.4 kg)   10/20/24 0130 106 lb 11.2 oz (48.4 kg)   10/19/24 2239 112 lb 7 oz (51 kg)   11/15/23 0532 113 lb 1.6 oz (51.3 kg)   23 0426 115 lb 4.8 oz (52.3 kg)   23  2346 122 lb (55.3 kg)   03/21/22 0852 124 lb (56.2 kg)       Labs:  Recent Labs   Lab 10/24/24  0605 10/25/24  0605 10/26/24  0607   * 109* 128*   BUN 70* 77* 77*   CREATSERUM 2.17* 1.95* 2.07*   EGFRCR 30* 34* 32*   CA 9.3 8.8 8.7    140 140   K 4.0 4.0 4.2    111 111   CO2 23.0 22.0 21.0     Recent Labs   Lab 10/23/24  0509 10/24/24  0604 10/26/24  0607   RBC 3.77* 3.70* 3.44*   HGB 11.2* 10.9* 10.3*   HCT 33.3* 32.4* 31.2*   MCV 88.3 87.6 90.7   MCH 29.7 29.5 29.9   MCHC 33.6 33.6 33.0   RDW 13.8 14.0 14.7   NEPRELIM 9.58* 10.00* 10.27*   WBC 10.3 10.9 11.8*   .0* 111.0* 111.0*         Recent Labs   Lab 10/20/24  1145   CK 46       Diagnostics:   [facility] 10/24/2024:  CAD s/p CABG, s/p PCI to OM  2. VT  Recommendations:  Recommend DAPT with ASA and plavix  Routine post cath and post PCI care  Finding of cardiac catheterization and further management was discussed in detail with patient.     ECHOCARDIOGRAM 10/21/2024:  1. Left ventricle: The cavity size was mildly to moderately increased.      Systolic function was markedly reduced. The estimated ejection fraction      was 15-20%, by visual assessment. There was severe diffuse hypokinesis      with regional variations. There was no evidence of a thrombus revealed by      acoustic contrast opacification.   2. Right ventricle: Pacer C/W ICD noted in the right ventricle.   3. Left atrium: The left atrial volume was mildly increased.   4. Right atrium: Pacer C/W ICD noted in right atrium.   5. Mitral valve: There was mild to moderate regurgitation.   6. Pulmonary arteries: Systolic pressure was mildly increased, in the range      of 35mm Hg to 40mm Hg.   Impressions:  This study is compared with previous dated 10/20/2024: No   significant change in ejection fraction.   *         Medications:   amiodarone  200 mg Oral BID with meals    atorvastatin  40 mg Oral Nightly    insulin degludec  10 Units Subcutaneous Daily    predniSONE  40 mg Oral Daily  with breakfast    heparin  5,000 Units Subcutaneous Q8H JENNIFFER    aspirin  81 mg Oral Nightly    carvedilol  12.5 mg Oral BID    clopidogrel  75 mg Oral Daily    pantoprazole  40 mg Oral QAM AC    insulin aspart  2-10 Units Subcutaneous TID AC and HS    budesonide  0.5 mg Nebulization 2 times daily    arformoterol  15 mcg Nebulization 2 times daily           Assessment/Plan:    Ischemic cardiomyopathy HFrEF of 15-20% w/ ICD in place since 2007 and recurrent VT  EP following- documentation reviewed - s/p multiple ICD discharges at presentation  BiV ICD upgrade attempted 10/25 - unable d/t anatomy   GDMT as tolerated by BP, renal function, and electrolytes  Off MRA hx of hyperkalemia, ACE held d/t hyperkalemia on admit  MV CAD s/p CABG 2006 (LIMA-LAD and SVG-RCA) s/p PCI to OM1 10/24/24  ASA, Plavix, statin, BB  Chest pain free  CKD   Nephrology following  Stable   Hyperkalemia  6.1 at presentation. Normalized.  COPD  Unilateral edema LUE  Follow venous doppler    PLAN  Continue DAPT, BB, and statin  GDMT as tolerated  CRT-D upgrade planned today  On amiodarone with [facility] EP team following  US LUE extremity  Discharge planning?      10/27/2024  05:14 AM  Zach Castle PA-C    Following PCI and NORRIS the patient should continue DAPT for one year unless discussed with his cardiologist.  We have reviewed the importance of DAPT therapy and the potential for stent restenosis, thrombosis, MI, and death if this medication is not taken appropriately. We have also reviewed the risks of bleeding on DAPT therapy, appropriate measures to reduce these risks, and symptoms to monitor for. ED return precautions were reviewed.

## 2024-10-27 NOTE — PHYSICAL THERAPY NOTE
PT order for re-evaluation received and chart reviewed. US doppler ordered for L UE due to swelling. Will hold PT at this time and follow up after doppler as medically appropriate. RN aware.

## 2024-10-27 NOTE — PROGRESS NOTES
Pulmonary Progress Note     Assessment / Plan:  Acute respiratory failure - due to AECOPD   - supplemental O2 as needed  AECOPD  - prednisone taper  - budesonide and Brovana nebs while inpatient. DC on Anoro.  - BD protocol  Ischemic cardiomyopathy - EF 15-20%  - per cardiology  Dispo  - desat screen   - DC planning      Subjective:  No events overnight.     Objective:  Vitals:    10/26/24 1936 10/26/24 2342 10/27/24 0140 10/27/24 0407   BP: 93/45 106/46  98/46   BP Location: Right arm Right arm  Right arm   Pulse: 60 61  60   Resp: 18 16  18   Temp: 97.7 °F (36.5 °C) 97.8 °F (36.6 °C)  98 °F (36.7 °C)   TempSrc: Oral Oral  Oral   SpO2: 97% 95%  94%   Weight:   135 lb 12.9 oz (61.6 kg)      Physical Exam:  General: no apparent distress, conversant  Skin: no rash, ulcers or subcutaneous nodules  Eyes: anicteric sclerae, moist conjunctivae  Head, ears, nose, throat: atraumatic, oropharynx clear with moist mucous membranes  Neck: trachea midline with no thyromegaly  Heart: regular rate and rhythm, no murmurs / rubs / gallops  Lungs: clear bilaterally, normal respiratory effort, no accessory muscle use  Extremities: no edema or cyanosis  Psych: interactive, answering questions appropriately, appropriate affect    Medications:  Reviewed in EMR    Lab Data:  Reviewed in EMR    Imaging:  I independently visualized all relevant chest imaging in PACS and agree with radiology interpretation except where noted.

## 2024-10-27 NOTE — PLAN OF CARE
Assumed patient care at 1930.  Alert and oriented x 4.  Out of bed in recliner.   On room air, oxygen saturation 95%.   Reports less coughing today, good appetite and generally feeling better than last night.Plan of care discussed regarding evening medications, vital signs and fall prevention measures.  Incision to right chest CDI, Mepelix in place, right groin incision open to air and without signs and symptoms of complications.  Telemetry monitoring in progress, Atrial paced  Denies pain or other discomfort.  Problem: Patient/Family Goals  Goal: Patient/Family Long Term Goal  Description: Patient's Long Term Goal: to go home    Interventions:  - MD rounding, medication management, monitor labs, angiogram, ICD upgrade  - See additional Care Plan goals for specific interventions  10/26/2024 2331 by Jacobo Moss RN  Outcome: Progressing  10/26/2024 2141 by Jacobo Moss RN  Outcome: Progressing  Goal: Patient/Family Short Term Goal  Description: Patient's Short Term Goal: remain pain free    Interventions:   - pain assessments q4, pain meds PRN  - See additional Care Plan goals for specific interventions  10/26/2024 2331 by Jacobo Moss RN  Outcome: Progressing  10/26/2024 2141 by Jacobo Moss RN  Outcome: Progressing     Problem: CARDIOVASCULAR - ADULT  Goal: Maintains optimal cardiac output and hemodynamic stability  Description: INTERVENTIONS:  - Monitor vital signs, rhythm, and trends  - Monitor for bleeding, hypotension and signs of decreased cardiac output  - Evaluate effectiveness of vasoactive medications to optimize hemodynamic stability  - Monitor arterial and/or venous puncture sites for bleeding and/or hematoma  - Assess quality of pulses, skin color and temperature  - Assess for signs of decreased coronary artery perfusion - ex. Angina  - Evaluate fluid balance, assess for edema, trend weights  10/26/2024 2331 by Jacobo Moss RN  Outcome: Progressing  10/26/2024 2141 by  Jacobo Moss, RN  Outcome: Progressing  Goal: Absence of cardiac arrhythmias or at baseline  Description: INTERVENTIONS:  - Continuous cardiac monitoring, monitor vital signs, obtain 12 lead EKG if indicated  - Evaluate effectiveness of antiarrhythmic and heart rate control medications as ordered  - Initiate emergency measures for life threatening arrhythmias  - Monitor electrolytes and administer replacement therapy as ordered  10/26/2024 2331 by Jacobo Moss RN  Outcome: Progressing  10/26/2024 2141 by Jacobo Moss RN  Outcome: Progressing     Problem: RESPIRATORY - ADULT  Goal: Achieves optimal ventilation and oxygenation  Description: INTERVENTIONS:  - Assess for changes in respiratory status  - Assess for changes in mentation and behavior  - Position to facilitate oxygenation and minimize respiratory effort  - Oxygen supplementation based on oxygen saturation or ABGs  - Provide Smoking Cessation handout, if applicable  - Encourage broncho-pulmonary hygiene including cough, deep breathe, Incentive Spirometry  - Assess the need for suctioning and perform as needed  - Assess and instruct to report SOB or any respiratory difficulty  - Respiratory Therapy support as indicated  - Manage/alleviate anxiety  - Monitor for signs/symptoms of CO2 retention  10/26/2024 2331 by Jacobo Moss, RN  Outcome: Progressing  10/26/2024 2141 by Jacobo Moss RN  Outcome: Progressing

## 2024-10-27 NOTE — PROGRESS NOTES
Feels ok         Past Medical History:    Anesthesia complication    was \"out of it\" for a long time after sedation with previous colonoscopies    Back problem    Carotid artery disease (HCC)    Congestive heart disease (HCC)    CORONARY ARTERY DISEASE    Coronary atherosclerosis    DIABETES    Diabetes (HCC)    Gastric ulcer    HEART ATTACK    age 61    High blood pressure    HYPERLIPIDEMIA    Sinus drainage    with weather changes         Medications:  Current Outpatient Medications   Medication Sig Dispense Refill    [START ON 10/28/2024] pantoprazole 40 MG Oral Tab EC Take 1 tablet (40 mg total) by mouth every morning before breakfast. 90 tablet 0    umeclidinium-vilanterol 62.5-25 MCG/ACT Inhalation Aerosol Powder, Breath Activated Inhale 1 puff into the lungs daily. 1 each 0    predniSONE 20 MG Oral Tab Take 1 tablet (20 mg total) by mouth daily for 5 days. 5 tablet 0       Social: The Patient denies tobacco, illicit drug use, and alcohol abuse    Family: The patient denies a history of sudden cardiac death or premature coronary artery disease    Allergies:  Allergies[1]       Physical:  /51 (BP Location: Right arm)   Pulse 60   Temp 97.7 °F (36.5 °C) (Oral)   Resp 19   Wt 135 lb 12.9 oz (61.6 kg)   SpO2 96%   BMI 21.92 kg/m²   GENERAL: well developed, well nourished, in no apparent distress  EYES: sclera anicteric  HEENT: normocephalic  NECK: no JVD, no carotid bruits, no thyromegaly  RESPIRATORY: clear to auscultation  CARDIOVASCULAR: S1, S2 normal, RRR; no S3, no S4; no click; no murmur  ABDOMEN: normal active BS, soft, nondistended; nontender  EXTREMITIES: no cyanosis, clubbing or edema, peripheral pulses intact  NEURO: no sensorimotor deficits  PSYCHIATRIC: alert and oriented x 3, affect normal  SKIN: IC site normal    Data:  No results found for: \"TSH\"  BUN (mg/dL)   Date Value   10/27/2024 86 (H)   10/26/2024 77 (H)     Blood Urea Nitrogen (mg/dL)   Date Value   03/14/2022 30.0 (H)    11/16/2021 36.0 (H)   05/17/2016 31 (H)   01/20/2016 28 (H)     Creatinine, Serum (mg/dL)   Date Value   05/17/2016 1.38 (H)   01/20/2016 1.35 (H)     Creatinine (mg/dL)   Date Value   10/27/2024 2.17 (H)   10/26/2024 2.07 (H)   03/14/2022 1.39 (H)   11/16/2021 1.50 (H)       ECG: NSR LAE V pacing QRS > 170  Tele - SR, no VT  Pcxr: no ptx  Echo: 10/21/24       Conclusions:     1. Left ventricle: The cavity size was mildly to moderately increased.      Systolic function was markedly reduced. The estimated ejection fraction      was 15-20%, by visual assessment. There was severe diffuse hypokinesis      with regional variations. There was no evidence of a thrombus revealed by      acoustic contrast opacification.   2. Right ventricle: Pacer C/W ICD noted in the right ventricle.   3. Left atrium: The left atrial volume was mildly increased.   4. Right atrium: Pacer C/W ICD noted in right atrium.   5. Mitral valve: There was mild to moderate regurgitation.   6. Pulmonary arteries: Systolic pressure was mildly increased, in the range      of 35mm Hg to 40mm Hg.   Impressions:  This study is compared with previous dated 10/20/2024: No   significant change in ejection fraction.       Impression:  ICM s/p DCICD 2007, [employer]   VT  S/p PCI Cx 10/24/2024  CKD Cr 2's  Hyperkalemia on intake labs  COPD quit 2023  IVCD      Plan:  amio 200 bid   Unable to upgrade to CRTD due to anatomy  US ordered for LUE edema. Was present at admission. Has collaterals already at subclavian. Would expect much more edema if there was acute clot but will see what US shows.       [1]   Allergies  Allergen Reactions    Niaspan [Niacin, Antihyperlipidemic] ITCHING and FACE FLUSHING    Heparin UNKNOWN     States possible allergy to heparin. Blood sugar elevated when he was given heparin

## 2024-10-28 ENCOUNTER — APPOINTMENT (OUTPATIENT)
Dept: ULTRASOUND IMAGING | Facility: HOSPITAL | Age: 79
End: 2024-10-28
Attending: PHYSICIAN ASSISTANT
Payer: MEDICARE

## 2024-10-28 VITALS
SYSTOLIC BLOOD PRESSURE: 99 MMHG | TEMPERATURE: 98 F | OXYGEN SATURATION: 96 % | RESPIRATION RATE: 17 BRPM | DIASTOLIC BLOOD PRESSURE: 50 MMHG | WEIGHT: 134.69 LBS | HEART RATE: 63 BPM | BODY MASS INDEX: 22 KG/M2

## 2024-10-28 LAB
ALBUMIN SERPL-MCNC: 3 G/DL (ref 3.2–4.8)
ANION GAP SERPL CALC-SCNC: 7 MMOL/L (ref 0–18)
BASOPHILS # BLD AUTO: 0.02 X10(3) UL (ref 0–0.2)
BASOPHILS NFR BLD AUTO: 0.2 %
BUN BLD-MCNC: 93 MG/DL (ref 9–23)
CALCIUM BLD-MCNC: 8.6 MG/DL (ref 8.7–10.4)
CHLORIDE SERPL-SCNC: 108 MMOL/L (ref 98–112)
CO2 SERPL-SCNC: 22 MMOL/L (ref 21–32)
CREAT BLD-MCNC: 2.26 MG/DL
EGFRCR SERPLBLD CKD-EPI 2021: 29 ML/MIN/1.73M2 (ref 60–?)
EOSINOPHIL # BLD AUTO: 0.01 X10(3) UL (ref 0–0.7)
EOSINOPHIL NFR BLD AUTO: 0.1 %
ERYTHROCYTE [DISTWIDTH] IN BLOOD BY AUTOMATED COUNT: 14.6 %
GLUCOSE BLD-MCNC: 106 MG/DL (ref 70–99)
GLUCOSE BLD-MCNC: 196 MG/DL (ref 70–99)
GLUCOSE BLD-MCNC: 199 MG/DL (ref 70–99)
GLUCOSE BLD-MCNC: 200 MG/DL (ref 70–99)
GLUCOSE BLD-MCNC: 96 MG/DL (ref 70–99)
HCT VFR BLD AUTO: 28 %
HGB BLD-MCNC: 9.4 G/DL
IMM GRANULOCYTES # BLD AUTO: 0.32 X10(3) UL (ref 0–1)
IMM GRANULOCYTES NFR BLD: 3 %
LYMPHOCYTES # BLD AUTO: 0.59 X10(3) UL (ref 1–4)
LYMPHOCYTES NFR BLD AUTO: 5.4 %
MAGNESIUM SERPL-MCNC: 2.6 MG/DL (ref 1.6–2.6)
MCH RBC QN AUTO: 29.5 PG (ref 26–34)
MCHC RBC AUTO-ENTMCNC: 33.6 G/DL (ref 31–37)
MCV RBC AUTO: 87.8 FL
MONOCYTES # BLD AUTO: 0.88 X10(3) UL (ref 0.1–1)
MONOCYTES NFR BLD AUTO: 8.1 %
NEUTROPHILS # BLD AUTO: 9.02 X10 (3) UL (ref 1.5–7.7)
NEUTROPHILS # BLD AUTO: 9.02 X10(3) UL (ref 1.5–7.7)
NEUTROPHILS NFR BLD AUTO: 83.2 %
OSMOLALITY SERPL CALC.SUM OF ELEC: 313 MOSM/KG (ref 275–295)
PHOSPHATE SERPL-MCNC: 3.9 MG/DL (ref 2.4–5.1)
PLATELET # BLD AUTO: 116 10(3)UL (ref 150–450)
POTASSIUM SERPL-SCNC: 3.7 MMOL/L (ref 3.5–5.1)
RBC # BLD AUTO: 3.19 X10(6)UL
SODIUM SERPL-SCNC: 137 MMOL/L (ref 136–145)
WBC # BLD AUTO: 10.8 X10(3) UL (ref 4–11)

## 2024-10-28 PROCEDURE — 82962 GLUCOSE BLOOD TEST: CPT

## 2024-10-28 PROCEDURE — 97165 OT EVAL LOW COMPLEX 30 MIN: CPT

## 2024-10-28 PROCEDURE — 85025 COMPLETE CBC W/AUTO DIFF WBC: CPT | Performed by: STUDENT IN AN ORGANIZED HEALTH CARE EDUCATION/TRAINING PROGRAM

## 2024-10-28 PROCEDURE — 97530 THERAPEUTIC ACTIVITIES: CPT

## 2024-10-28 PROCEDURE — 97168 OT RE-EVAL EST PLAN CARE: CPT

## 2024-10-28 PROCEDURE — 97161 PT EVAL LOW COMPLEX 20 MIN: CPT

## 2024-10-28 PROCEDURE — 80069 RENAL FUNCTION PANEL: CPT | Performed by: INTERNAL MEDICINE

## 2024-10-28 PROCEDURE — 97116 GAIT TRAINING THERAPY: CPT

## 2024-10-28 PROCEDURE — 94640 AIRWAY INHALATION TREATMENT: CPT

## 2024-10-28 PROCEDURE — 93971 EXTREMITY STUDY: CPT | Performed by: PHYSICIAN ASSISTANT

## 2024-10-28 PROCEDURE — 83735 ASSAY OF MAGNESIUM: CPT | Performed by: INTERNAL MEDICINE

## 2024-10-28 RX ORDER — ATORVASTATIN CALCIUM 40 MG/1
40 TABLET, FILM COATED ORAL NIGHTLY
Qty: 30 TABLET | Refills: 1 | Status: SHIPPED | OUTPATIENT
Start: 2024-10-28 | End: 2024-10-28

## 2024-10-28 RX ORDER — INSULIN ASPART 100 [IU]/ML
INJECTION, SOLUTION INTRAVENOUS; SUBCUTANEOUS
Qty: 5 EACH | Refills: 3 | Status: SHIPPED | OUTPATIENT
Start: 2024-10-28

## 2024-10-28 RX ORDER — FUROSEMIDE 40 MG/1
40 TABLET ORAL DAILY
Status: DISCONTINUED | OUTPATIENT
Start: 2024-10-28 | End: 2024-10-28

## 2024-10-28 RX ORDER — BLOOD SUGAR DIAGNOSTIC
STRIP MISCELLANEOUS
Qty: 100 STRIP | Refills: 3 | Status: SHIPPED | OUTPATIENT
Start: 2024-10-28 | End: 2024-10-28

## 2024-10-28 RX ORDER — PANTOPRAZOLE SODIUM 40 MG/1
40 TABLET, DELAYED RELEASE ORAL
Qty: 90 TABLET | Refills: 0 | Status: SHIPPED | OUTPATIENT
Start: 2024-10-28

## 2024-10-28 RX ORDER — LANCETS 33 GAUGE
EACH MISCELLANEOUS
Qty: 100 EACH | Refills: 6 | Status: SHIPPED | OUTPATIENT
Start: 2024-10-28 | End: 2024-10-28

## 2024-10-28 RX ORDER — INSULIN ASPART 100 [IU]/ML
INJECTION, SOLUTION INTRAVENOUS; SUBCUTANEOUS
Qty: 5 EACH | Refills: 3 | Status: SHIPPED | OUTPATIENT
Start: 2024-10-28 | End: 2024-10-28

## 2024-10-28 RX ORDER — BLOOD SUGAR DIAGNOSTIC
STRIP MISCELLANEOUS
Qty: 100 STRIP | Refills: 6 | Status: SHIPPED | OUTPATIENT
Start: 2024-10-28

## 2024-10-28 RX ORDER — AMIODARONE HYDROCHLORIDE 200 MG/1
200 TABLET ORAL 2 TIMES DAILY WITH MEALS
Qty: 90 TABLET | Refills: 0 | Status: SHIPPED | OUTPATIENT
Start: 2024-10-28 | End: 2024-10-28

## 2024-10-28 RX ORDER — FUROSEMIDE 40 MG/1
40 TABLET ORAL DAILY
Qty: 60 TABLET | Refills: 1 | Status: SHIPPED | OUTPATIENT
Start: 2024-10-29

## 2024-10-28 RX ORDER — ACETAMINOPHEN 325 MG/1
650 TABLET ORAL EVERY 6 HOURS PRN
Qty: 30 TABLET | Refills: 0 | Status: SHIPPED | COMMUNITY
Start: 2024-10-28

## 2024-10-28 RX ORDER — BLOOD SUGAR DIAGNOSTIC
STRIP MISCELLANEOUS
Qty: 100 STRIP | Refills: 6 | Status: SHIPPED | OUTPATIENT
Start: 2024-10-28 | End: 2024-10-28

## 2024-10-28 RX ORDER — LANCETS 33 GAUGE
EACH MISCELLANEOUS
Qty: 100 EACH | Refills: 6 | Status: SHIPPED | OUTPATIENT
Start: 2024-10-28

## 2024-10-28 RX ORDER — BLOOD SUGAR DIAGNOSTIC
STRIP MISCELLANEOUS
Qty: 100 STRIP | Refills: 3 | Status: SHIPPED | OUTPATIENT
Start: 2024-10-28

## 2024-10-28 RX ORDER — AMIODARONE HYDROCHLORIDE 200 MG/1
200 TABLET ORAL 2 TIMES DAILY WITH MEALS
Qty: 90 TABLET | Refills: 0 | Status: SHIPPED | OUTPATIENT
Start: 2024-10-28

## 2024-10-28 RX ORDER — ATORVASTATIN CALCIUM 40 MG/1
40 TABLET, FILM COATED ORAL NIGHTLY
Qty: 30 TABLET | Refills: 1 | Status: SHIPPED | OUTPATIENT
Start: 2024-10-28

## 2024-10-28 RX ORDER — FUROSEMIDE 40 MG/1
40 TABLET ORAL DAILY
Qty: 60 TABLET | Refills: 1 | Status: SHIPPED | OUTPATIENT
Start: 2024-10-29 | End: 2024-10-28

## 2024-10-28 RX ORDER — BLOOD-GLUCOSE METER
EACH MISCELLANEOUS
Qty: 1 KIT | Refills: 0 | Status: SHIPPED | OUTPATIENT
Start: 2024-10-28

## 2024-10-28 RX ORDER — BLOOD-GLUCOSE METER
EACH MISCELLANEOUS
Qty: 1 KIT | Refills: 0 | Status: SHIPPED | OUTPATIENT
Start: 2024-10-28 | End: 2024-10-28

## 2024-10-28 NOTE — CM/SW NOTE
10/28/24 1400   CM/SW Referral Data   Referral Source Social Work (self-referral)   Reason for Referral Discharge planning   Informant EMR;Clinical Staff Member;Patient   Medical Hx   Does patient have an established PCP? Yes   Patient Info   Patient's Current Mental Status at Time of Assessment Alert;Oriented   Patient's Home Environment House   Patient lives with Spouse/Significant other   Patient Status Prior to Admission   Independent with ADLs and Mobility Yes   Discharge Needs   Anticipated D/C needs Home health care   Choice of Post-Acute Provider   Informed patient of right to choose their preferred provider Yes   List of appropriate post-acute services provided to patient/family with quality data Yes   Information given to Patient     HOME SITUATION per PT eval  [facility]  Home Layout: One level  Stairs to Enter : 1                  Lives With: Spouse    Drives: Yes   Patient Regularly Uses: Reading glasses;Rolling walker (wife has a RW from a knee injury but pt would be able to use if needed)       Prior Level of [place] per PT eval: Normally indep, enjoys going on the treadmill does 4 miles/week, likes to do the [place] [employer] cross word puzzles    Patient is an 80 y/o male admitted due to ventricular tachyarrhythmia. Anticipated therapy need for pt at DC is HH. DWIGHT sent referral in [place] and obtained options list.    SW met with pt and family at bedside to discuss DC plan. Pt reports she lives at home with his wife. Pt stated he has a walker and wheelchair at home, but reports he does not use them. Pt stated he is typically independent with his ADLs and mobility.    SW discussed HH services for pt at DC. Pt agreeable with HH. SW provided him with options list. DWIGHT stated she would follow up with him for choice later today. Pt agreeable with this.    Pt inquired about his medication and the pharmacy being used. Pt stated he can only get 15 days worth of medication at his regular  pharmacy and any thing after that he uses the [facility]. Pt wants to make sure scripts get sent to both his mailservice pharmacy and his regular pharmacy.     SW discussed medication concerns with RN. SW was notified by RN that pt is unable to get scripts sent to both pharmacies. RN stated pt will need to get scripts sent to either his local pharmacy or [facility] to get the scripts filled and would need to follow up with his physicians for further medication needs. RN spoke with pt and pt is agreeable with getting scripts filled with [facility].     to remain available for support and/or discharge planning.    PABLO Gregory  Discharge Planner  [phone]

## 2024-10-28 NOTE — PLAN OF CARE
Pt is ok to discharge per primary and consults. Discharge instructions including meds & follow ups given. Patient verbalizes understanding & questions answered. IV removed, tele monitor discontinued, all belongings taken with patient. Pt transported off unit via wheelchair to [facility] with wife.

## 2024-10-28 NOTE — PHYSICAL THERAPY NOTE
PHYSICAL THERAPY EVALUATION - INPATIENT     Room Number: 2601/2601-A  Evaluation Date: 10/28/2024  Type of Evaluation: Re-evaluation  Physician Order: PT Eval and Treat    Presenting Problem: ventricular tachyarrhythmia  Co-Morbidities : DM, CAD, CHF, MI, HTN, ICD, AAA repair, CABG  Reason for Therapy: Mobility Dysfunction and Discharge Planning    PHYSICAL THERAPY ASSESSMENT   Patient is a 79 year old male admitted 10/19/2024 for ventricular tachyarrhythmia. His ICD fired 4 times. Pt is s/p LHC on 10/24/24. Pt is s/p defibrillator generator change on 10/25/24.  Prior to admission, patient's baseline is independent with no AD.  Patient is currently functioning below baseline with bed mobility, transfers, gait, and standing prolonged periods.  Patient is requiring contact guard assist and minimal assist as a result of the following impairments: decreased functional strength, decreased endurance/aerobic capacity, impaired   balance, and decreased muscular endurance.  Physical Therapy will continue to follow for duration of hospitalization.    Patient will benefit from continued skilled PT Services at discharge to promote prior level of function and safety with additional support and return home with home health PT.    PLAN DURING HOSPITALIZATION  Nursing Mobility Recommendation : 1 Assist  PT Device Recommendation: Rolling walker;Gait belt  PT Treatment Plan: Bed mobility;Endurance;Energy conservation;Patient education;Family education;Gait training;Strengthening;Transfer training;Balance training  Rehab Potential : Good  Frequency (Obs): 3-5x/week     CURRENT GOALS    Goal #1 Patient is able to demonstrate supine - sit EOB @ level: supervision     Goal #2 Patient is able to demonstrate transfers Sit to/from Stand at assistance level: supervision     Goal #3 Patient is able to ambulate 150 feet with assist device: walker - rolling at assistance level: supervision     Goal #4    Goal #5    Goal #6    Goal Comments:  Goals established on 10/28/2024      PHYSICAL THERAPY MEDICAL/SOCIAL HISTORY  History related to current admission: Patient is a 79 year old male admitted on 10/19/2024 from home for ventricular tachyarrhythmia. His ICD fired 4 times. Pt is s/p LHC on 10/24/24. Pt is s/p defibrillator generator change on 10/25/24. Pt with LUE doppler pending at time of PT eval, however pt cleared to work with therapy per Dr. Ashley.     HOME SITUATION  Type of Home: House  Home Layout: One level  Stairs to Enter : 1        Stairs to Bedroom: 0         Lives With: Spouse    Drives: Yes   Patient Regularly Uses:  (pt has a RW but does not typically use)      Prior Level of [place]: Pt is typically independent with no AD and active, works out close to daily.     SUBJECTIVE  Pt cooperative during session    OBJECTIVE  Precautions: Bed/chair alarm;Limb alert - left  Fall Risk: High fall risk    WEIGHT BEARING RESTRICTION     PAIN ASSESSMENT  Ratin          COGNITION  Overall Cognitive Status:  WFL - within functional limits    RANGE OF MOTION AND STRENGTH ASSESSMENT  Upper extremity ROM and strength are within functional limits     Lower extremity ROM is within functional limits     Lower extremity strength is within functional limits, except generalized weakness and deconditioning    BALANCE  Static Sitting: Good  Dynamic Sitting: Fair +  Static Standing: Fair -  Dynamic Standing: Poor +    ADDITIONAL TESTS                                    ACTIVITY TOLERANCE                         O2 WALK   98% SPO2 on RA at rest   93% SPO2 on RA with ambulation    NEUROLOGICAL FINDINGS                        AM-PAC '6-Clicks' INPATIENT SHORT FORM - BASIC MOBILITY  How much difficulty does the patient currently have...  Patient Difficulty: Turning over in bed (including adjusting bedclothes, sheets and blankets)?: None   Patient Difficulty: Sitting down on and standing up from a chair with arms (e.g., wheelchair, bedside commode, etc.): A  Little   Patient Difficulty: Moving from lying on back to sitting on the side of the bed?: None   How much help from another person does the patient currently need...   Help from Another: Moving to and from a bed to a chair (including a wheelchair)?: A Little   Help from Another: Need to walk in hospital room?: A Little   Help from Another: Climbing 3-5 steps with a railing?: A Little     AM-PAC Score:  Raw Score: 20   Approx Degree of Impairment: 35.83%   Standardized Score (AM-PAC Scale): 47.67   CMS Modifier (G-Code): CJ    FUNCTIONAL ABILITY STATUS  Gait Assessment   Functional Mobility/Gait Assessment  Gait Assistance: Contact guard assist;Minimum assistance  Distance (ft): 100, 75, 50  Assistive Device: Rolling walker  Pattern: Within Functional Limits (with fatigue pt with increased B knee flexion and increased flexed posture)    Skilled Therapy Provided: Per RN chase to work with pt. Pt received in supine and was agreeable to PT session.     Bed Mobility:  Rolling: NT  Supine to sit: supervision   Sit to supine: NT     Transfer Mobility:  Sit to stand: CGA   Stand to sit: CGA  Gait = pt ambulated with RW and CGA, as pt fatigued he required min A. Pt needed a seated rest break in between each distance listed above.     Therapist's Comments: Pt educated on role of therapy, goals for session, safety, fall prevention, and activity recommendations.     Exercise/Education Provided:  Bed mobility  Energy conservation  Functional activity tolerated  Gait training  Posture  Strengthening  Transfer training    Patient End of Session: Up in chair;Needs met;Call light within reach;RN aware of session/findings;All patient questions and concerns addressed;Hospital anti-slip socks;Alarm set;Family present;Discussed recommendations with /      Patient Evaluation Complexity Level:  History Moderate - 1 or 2 personal factors and/or co-morbidities   Examination of body systems Low -  addressing 1-2  elements   Clinical Presentation Low- Stable   Clinical Decision Making Low Complexity       PT Session Time: 30 minutes  Gait Training: 10 minutes

## 2024-10-28 NOTE — PLAN OF CARE
Received pt at 0730.   Pt is A&Ox4, no pain. On room air, lungs are diminished, no coughing, no 02 required on walk. A paced, no chest pain. Continent of B&B, per pt last BM 10-22- denies constipation, colace given with morning meds- will reassess constipation his afternoon. LUE edema- LUE US ordered. Pt updated with plan of care.     Approx 1600- pt requesting to get meds filled at [facility] & sent to mail order pharmacy. Per MD's unaware how to do. Notified [facility]- they do not recommend as medications may be adjusted at follow ups & doses may not be accurate. Reviewed with clinical coordinator- only one pharmacy can receive medications, pt can get refills a follow ups, or if there is an issue and pt runs out prior to follow ups- he is to call MD's office for refill to his local pharmacy - [facility].         Problem: Patient/Family Goals  Goal: Patient/Family Long Term Goal  Description: Patient's Long Term Goal: to go home  Stay out of hospital 10-28    Interventions:  - MD rounding, medication management, monitor labs, angiogram, ICD upgrade  - med compliance, follow ups     - See additional Care Plan goals for specific interventions  Outcome: Progressing  Goal: Patient/Family Short Term Goal  Description: Patient's Short Term Goal: remain pain free  No pain 10-28    Interventions:   - pain assessments q4, pain meds PRN  - PRN pain meds, hot/cold packs, tell nurse if in pain     - See additional Care Plan goals for specific interventions  Outcome: Progressing     Problem: CARDIOVASCULAR - ADULT  Goal: Maintains optimal cardiac output and hemodynamic stability  Description: INTERVENTIONS:  - Monitor vital signs, rhythm, and trends  - Monitor for bleeding, hypotension and signs of decreased cardiac output  - Evaluate effectiveness of vasoactive medications to optimize hemodynamic stability  - Monitor arterial and/or venous puncture sites for bleeding and/or hematoma  - Assess quality of pulses, skin color  and temperature  - Assess for signs of decreased coronary artery perfusion - ex. Angina  - Evaluate fluid balance, assess for edema, trend weights  Outcome: Progressing  Goal: Absence of cardiac arrhythmias or at baseline  Description: INTERVENTIONS:  - Continuous cardiac monitoring, monitor vital signs, obtain 12 lead EKG if indicated  - Evaluate effectiveness of antiarrhythmic and heart rate control medications as ordered  - Initiate emergency measures for life threatening arrhythmias  - Monitor electrolytes and administer replacement therapy as ordered  Outcome: Progressing     Problem: RESPIRATORY - ADULT  Goal: Achieves optimal ventilation and oxygenation  Description: INTERVENTIONS:  - Assess for changes in respiratory status  - Assess for changes in mentation and behavior  - Position to facilitate oxygenation and minimize respiratory effort  - Oxygen supplementation based on oxygen saturation or ABGs  - Provide Smoking Cessation handout, if applicable  - Encourage broncho-pulmonary hygiene including cough, deep breathe, Incentive Spirometry  - Assess the need for suctioning and perform as needed  - Assess and instruct to report SOB or any respiratory difficulty  - Respiratory Therapy support as indicated  - Manage/alleviate anxiety  - Monitor for signs/symptoms of CO2 retention  Outcome: Progressing

## 2024-10-28 NOTE — PROGRESS NOTES
S: He complains of dyspnea on exertion with walking in halls. Lowest SpO2 with exertion is low 90's per physical therapist. He is not coughing much.     Meds:   docusate sodium  100 mg Oral BID    furosemide  20 mg Oral Daily    amiodarone  200 mg Oral BID with meals    atorvastatin  40 mg Oral Nightly    insulin degludec  10 Units Subcutaneous Daily    heparin  5,000 Units Subcutaneous Q8H JENNIFFER    aspirin  81 mg Oral Nightly    carvedilol  12.5 mg Oral BID    clopidogrel  75 mg Oral Daily    pantoprazole  40 mg Oral QAM AC    insulin aspart  2-10 Units Subcutaneous TID AC and HS    budesonide  0.5 mg Nebulization 2 times daily    arformoterol  15 mcg Nebulization 2 times daily       Prn Meds:    polyethylene glycol (PEG 3350)    bisacodyl    ipratropium-albuterol    ondansetron    acetaminophen **OR** HYDROcodone-acetaminophen **OR** HYDROcodone-acetaminophen    metoclopramide    albuterol    fentaNYL    Infusions:      OBJECTIVE:  Vitals:    10/27/24 2055 10/27/24 2200 10/27/24 2310 10/28/24 0454   BP:   94/44 119/40   Pulse:  60 63    Resp:   19 18   Temp:   98.1 °F (36.7 °C) 97 °F (36.1 °C)   TempSrc:   Oral Oral   SpO2: 95% 97% 96%    Weight:    134 lb 11.2 oz (61.1 kg)     O2: room air    Gen - Alert, oriented x 3, in no apparent distress  Lungs - CTAB, no wheezing/crackles  CV - regular rate & rhythm. Normal S1, S2. No murmurs   Abdomen - soft, nontender to palpation   Extremities - No cyanosis, clubbing, edema appreciated.        Labs:  Recent Labs   Lab 10/24/24  0604 10/26/24  0607 10/27/24  0742   WBC 10.9 11.8* 9.9   HGB 10.9* 10.3* 9.8*   .0* 111.0* 102.0*     Recent Labs   Lab 10/25/24  0605 10/26/24  0607 10/27/24  0742    140 138   K 4.0 4.2 4.0    111 110   CO2 22.0 21.0 22.0   BUN 77* 77* 86*   CREATSERUM 1.95* 2.07* 2.17*   * 128* 141*   ANIONGAP 7 8 6   ALB 3.3 3.2 3.0*   CA 8.8 8.7 8.9   MG 2.6 2.6 2.7*   PHOS 4.9 5.2* 4.5     Recent Labs   Lab  10/27/24  0742   PBNP 14,628*     Recent Labs   Lab 10/21/24  1729   ABGPHT 7.40   SCVYQD0J 36   ZAQRE7L 88   ABGHCO3 23.3   ABGBE -2.1*   LACTIBG 1.1     Recent Labs   Lab 10/21/24  0819   COVID19 Not Detected   INFAPCR Not Detected   INFBPCR Not Detected     Imaging reviewed    ASSESSMENT AND PLAN      Acute hypoxemic respiratory failure - due to AECOPD. Improved, now on room air  - supplemental O2 as needed  AECOPD  - patient was treated with several days of steroids, no need to continue at this time  - budesonide and Brovana nebs while inpatient;  will discharge home on Anoro.  - BD protocol  Ischemic cardiomyopathy - EF 15-20%. S/p LHC with PCI/NORRIS to OM1 (10/24)  - per cardiology  Dispo  - full code  - inpatient - no objection to discharge from a pulmonary standpoint. He can follow up with Dr. Clemons in 2 weeks after discharge.       Ming Denson M.D.  Pulmonary/Critical Care

## 2024-10-28 NOTE — PLAN OF CARE
Assumed patient care at 1930.  Alert and oriented x 4.  Resting in bed.  On room air, oxygen saturation 95%.   Reports less coughing today, good appetite and generally feeling better . Reports sitting up in chair for   a few hours today. PT/OT evaluation pending.   Left arm Ultrasound pending  or increase girth, denies pain or numbness  to left arm.Plan of care discussed regarding evening medications, vital signs and fall prevention measures.  Incision to right chest CDI, right groin incision open to air and without signs and symptoms of complications.  Telemetry monitoring in progress, Atrial paced  Denies pain or other discomfort.  Problem: Patient/Family Goals  Goal: Patient/Family Long Term Goal  Description: Patient's Long Term Goal: to go home    Interventions:  - MD rounding, medication management, monitor labs, angiogram, ICD upgrade  - See additional Care Plan goals for specific interventions  10/26/2024 2331 by Jacobo Moss RN  Outcome: Progressing  10/26/2024 2141 by Jacobo Moss RN  Outcome: Progressing  Goal: Patient/Family Short Term Goal  Description: Patient's Short Term Goal: remain pain free    Interventions:   - pain assessments q4, pain meds PRN  - See additional Care Plan goals for specific interventions  10/26/2024 2331 by Jacobo Moss RN  Outcome: Progressing  10/26/2024 2141 by Jacobo Moss RN  Outcome: Progressing     Problem: CARDIOVASCULAR - ADULT  Goal: Maintains optimal cardiac output and hemodynamic stability  Description: INTERVENTIONS:  - Monitor vital signs, rhythm, and trends  - Monitor for bleeding, hypotension and signs of decreased cardiac output  - Evaluate effectiveness of vasoactive medications to optimize hemodynamic stability  - Monitor arterial and/or venous puncture sites for bleeding and/or hematoma  - Assess quality of pulses, skin color and temperature  - Assess for signs of decreased coronary artery perfusion - ex. Angina  - Evaluate fluid  balance, assess for edema, trend weights  10/26/2024 2331 by Jacobo Moss RN  Outcome: Progressing  10/26/2024 2141 by Jacobo Moss RN  Outcome: Progressing  Goal: Absence of cardiac arrhythmias or at baseline  Description: INTERVENTIONS:  - Continuous cardiac monitoring, monitor vital signs, obtain 12 lead EKG if indicated  - Evaluate effectiveness of antiarrhythmic and heart rate control medications as ordered  - Initiate emergency measures for life threatening arrhythmias  - Monitor electrolytes and administer replacement therapy as ordered  10/26/2024 2331 by Jacobo Moss RN  Outcome: Progressing  10/26/2024 2141 by Jacobo Moss RN  Outcome: Progressing     Problem: RESPIRATORY - ADULT  Goal: Achieves optimal ventilation and oxygenation  Description: INTERVENTIONS:  - Assess for changes in respiratory status  - Assess for changes in mentation and behavior  - Position to facilitate oxygenation and minimize respiratory effort  - Oxygen supplementation based on oxygen saturation or ABGs  - Provide Smoking Cessation handout, if applicable  - Encourage broncho-pulmonary hygiene including cough, deep breathe, Incentive Spirometry  - Assess the need for suctioning and perform as needed  - Assess and instruct to report SOB or any respiratory difficulty  - Respiratory Therapy support as indicated  - Manage/alleviate anxiety  - Monitor for signs/symptoms of CO2 retention  10/26/2024 2331 by Jacobo Moss RN  Outcome: Progressing  10/26/2024 2141 by Jacobo Moss RN  Outcome: Progressing

## 2024-10-28 NOTE — PROGRESS NOTES
Progress Note  Mingo White Patient Status:  Inpatient    1945 MRN [id number]   Location [facility] 2NE-A Attending Karina Rothman, DO   Hosp Day # 8 PCP Daren Espino MD     Subjective:  He is doing ok this morning. Still waiting on LUE ultrasound. His WALTERS is slightly worse than his baseline with ambulating halls with therapy. Denies chest pain or shortness of breath at rest.     Objective:  /40 (BP Location: Right arm)   Pulse 63   Temp 97 °F (36.1 °C) (Oral)   Resp 18   Wt 134 lb 11.2 oz (61.1 kg)   SpO2 96%   BMI 21.74 kg/m²     Intake/Output:    Intake/Output Summary (Last 24 hours) at 10/28/2024 0728  Last data filed at 10/28/2024 0453  Gross per 24 hour   Intake 120 ml   Output 1100 ml   Net -980 ml       Last 3 Weights   10/28/24 0454 134 lb 11.2 oz (61.1 kg)   10/27/24 0140 135 lb 12.9 oz (61.6 kg)   10/23/24 0500 136 lb 3.9 oz (61.8 kg)   10/20/24 1208 106 lb 11.2 oz (48.4 kg)   10/20/24 0130 106 lb 11.2 oz (48.4 kg)   10/19/24 2239 112 lb 7 oz (51 kg)   11/15/23 0532 113 lb 1.6 oz (51.3 kg)   23 0426 115 lb 4.8 oz (52.3 kg)   23 2346 122 lb (55.3 kg)   22 0852 124 lb (56.2 kg)       Labs:  Recent Labs   Lab 10/26/24  0607 10/27/24  0742 10/28/24  0623   * 141* 96   BUN 77* 86* 93*   CREATSERUM 2.07* 2.17* 2.26*   EGFRCR 32* 30* 29*   CA 8.7 8.9 8.6*    138 137   K 4.2 4.0 3.7    110 108   CO2 21.0 22.0 22.0     Recent Labs   Lab 10/26/24  0607 10/27/24  0742 10/28/24  0623   RBC 3.44* 3.22* 3.19*   HGB 10.3* 9.8* 9.4*   HCT 31.2* 27.6* 28.0*   MCV 90.7 85.7 87.8   MCH 29.9 30.4 29.5   MCHC 33.0 35.5 33.6   RDW 14.7 14.5 14.6   NEPRELIM 10.27* 8.36* 9.02*   WBC 11.8* 9.9 10.8   .0* 102.0* 116.0*         No results for input(s): \"TROP\", \"TROPHS\", \"CK\" in the last 168 hours.    Diagnostics:   Telemetry: A paced V sensed. No runs of ventricular rhythm    CXR  CONCLUSION:    Improvement.  Continued but decrease in mixed interstitial and  airspace opacities in the lung, now primarily at the right and left lung base.  No new or worsening process.  Continued hyperinflation.  Stable cardiac enlargement.  Stable   pacemaker.  No sign of pneumothorax or CHF.     Review of Systems   Cardiovascular:  Positive for dyspnea on exertion. Negative for chest pain.   Respiratory:  Negative for shortness of breath.        Physical Exam:  General: Alert and oriented in no apparent distress.  HEENT: Pupils equal. Mucous membranes moist.   Neck: No JVD  Cardiac:  Normal S1 S2, Regular. No murmur  Lungs: diminished lung sounds in the bases   Abdomen: Soft, non-tender, ND  Extremities: 1+ LUE edema. Trace ankle edema  Neurologic: No focal deficits. Normal affect.  Skin: Warm and dry,    Medications:   docusate sodium  100 mg Oral BID    furosemide  20 mg Oral Daily    amiodarone  200 mg Oral BID with meals    atorvastatin  40 mg Oral Nightly    insulin degludec  10 Units Subcutaneous Daily    heparin  5,000 Units Subcutaneous Q8H JENNIFFER    aspirin  81 mg Oral Nightly    carvedilol  12.5 mg Oral BID    clopidogrel  75 mg Oral Daily    pantoprazole  40 mg Oral QAM AC    insulin aspart  2-10 Units Subcutaneous TID AC and HS    budesonide  0.5 mg Nebulization 2 times daily    arformoterol  15 mcg Nebulization 2 times daily         Assessment:    Recurrent VT with ICD shocks x 4 on 10/20/24, in the setting of previous VT with ischemic cardiomyopathy, LVEF 15-20% with DC-ICD   s/p PCI to OM1 10/24/24  [facility] EP following. s/p gen change and unsuccessful attempt at Bi-V ICD upgrade due to difficult anatomy for lead placement in CS on 10/25/24  Amiodarone 200 mg BID started this admission. Coreg 12.5 mg BID  Telemetry without recurrence of ventricular arrhythmias  MV CAD s/p CABG 2006 (LIMA-LAD, SVG-RCA) s/p recent PCI to OM1 10/24/24  Denies angina. Continue DAPT ASA and plavix. BB. Statin.  HFrEF  Compensated, euvolemic  GDMT: Coreg. CKD with hyperkalemia on presentation limiting  ARB/ARNI/MRA. Could consider eventual outpatient SGLT2, defer to nephrology.   Lasix 20 mg daily  MAIA on CKD - Nephrology following, slightly above baseline but stabilized  Unilateral LUE edema - venous US pending  COPD, acute exacerbation - pulmonary following  Hyperkalemia, resolved - K 6.1 on presentation. Resolved.   HLD - LDL well controlled 38. statin    Plan:    Venous US LUE pending  Appears compensated, euvolemic from HFrEF/ischemic CMP standpoint  Discussed importance of strict compliance with DAPT after PCI NORRIS this admission with ASA and plavix. Continue BB. Statin.  No recurrence ventricular arrhythmias.  Amiodarone new addition this admission. Continue BB.  Possibly stable for discharge later this afternoon from cardiac standpoint once LUE US results      Cardiology attending:  Pt seen and independently examined.  Note edited.  Plan of care performed by myself in its entirety.  Wife at the bedside.  He still has dyspnea with activity, but reasonable.  On RA at rest.  Tele negative.  Hyperkalemia limits GDMT but had been stable for years.  Mobilize and see how he feels.  Await LUE ultrasound.  40 mg po furosemide on dc.  They have lots of EP questions - await Dr. Pedroza f/u.        Alexandru Rogers MD  L3      Plan of care discussed with patient, RN.    LORI Lo  10/28/2024  7:28 AM

## 2024-10-28 NOTE — DISCHARGE INSTRUCTIONS
HOME CARE INSTRUCTIONS FOLLOWING CORONARY ANGIOGRAPHY, ANGIOPLASTY (PTCA/PTA) OR INSERTION OF STENT IN THE CORONARY ARTERIES        Activity  DO NOT drive after the procedure.  You may resume driving late the following day according to the nurse or physician's instructions  Plan on resting and relaxing tonight and tomorrow  Resume your normal activity after 48 hours, or as instructed by your physician  Do not lift anything over 10 pounds for the next 24 hours  Avoid drinking alcohol for the next 24 hours  If the groin site was used, avoid repeated stair use and excessive walking for the next 24 hours  If the wrist was used, avoid bending/flexing of the wrist for the next 24 hours     What is Normal?  A small lump at the procedure site associated with mild tenderness when touched  The procedure site may be bruised or discolored  There may be a small amount of drainage on the bandage     Special Instructions  Drink plenty of fluids during the next 24 hours to \"flush\" the contrast from your system  The bandage is to remain in place for 24 hours  Keep the bandage clean and dry  DO NOT submerge the procedure site for 72 hours (no bath tubs or pools)  This includes dishwashing/submersion of the wrist, if the wrist was used  After 24 hours, you must remove the bandage  You should shower after removing the bandage, and wash the procedure site gently with soap and water  If you choose to wear a bandage for a few days, make sure it remains clean and dry and that it is changed daily     When you should NOTIFY YOUR PHYSICIAN  Bleeding can occur at the procedure site - both on the outside of the skin and/or beneath the surface of the skin  Swelling or a large lump at the procedure site can occur, which may be accompanied by moderate to severe pain     If either of the above occurs, lie down flat.  Have someone apply pressure to the procedure site with both hands, as instructed by the nurse.  Hold pressure for 20 minutes and  the bleeding should stop.  Notify your physician of the occurrence  If the bleeding does not stop, call 911 and continue to apply pressure     If you experience signs of a fever, temperature > 101°, chills, infection (redness, swelling, thick yellow drainage, or a foul odor from the procedure site)  If you notice any numbness, tingling, or loss of feeling to your leg or foot or groin access  If you notice any numbness, tingling, or loss of feeling to your fingers or hand, if wrist access was utilized     If You Received a Stent:     You will remain on an antiplatelet drug and/or aspirin.  Antiplatelet medications are usually taken for six months to one year and should not be stopped unless your cardiologist directs you to do so.  These medications help to prevent blockage at the stent site.  If another physician or dentist asks you to stop your antiplatelet medication, you need to consult your cardiologist first.  Together, your cardiologist and other physician can discuss the risks that may be involved if you are not taking the antiplatelet medication   If an MRI is necessary, it may be done 4-6 weeks after your procedure.  Verify this with your cardiologist  Keep your stent card with you at all times!  If you need an MRI in the future, your stent card will need to be shown to the technologist before performing the MRI.  A duplicate card CANNOT be reproduced.     Other  You may resume your present diet, unless otherwise specified by your physician.  You may resume all of your medications as prescribed, unless otherwise directed by your physician.  A list of your medications was provided to you at discharge.  Continue the walking program initiated in the hospital and progress your walking as directed.  Or, gradually resume your previous aerobic exercise schedule as tolerated.  Please call your physician’s office for a follow-up appointment.  You should be seen in 2 weeks.    Going Home Instructions  In this section  you will find the tools which will guide you through the first few days after you leave the hospital. Continued use of these tools will help you develop the skills necessary to keep your heart failure under control.     Home Care Instructions Following Heart Failure - the most important things to do every day include:   Weigh yourself and review the “Self-Check Plan” sheet every morning.   Call your cardiologist office if you are in the “Pay Attention-Use Caution” (yellow zone) or “Medical Alert-Warning!” (red zone) as outlined in the Self-Check Plan sheet.  Take your medicines as prescribed.  Limit your sodium (salt) intake.  Know when to call your cardiologist, primary doctor, or nurse.  Know when to seek emergency care.      Things for You to Remember:   1. See your doctor or healthcare provider as written on your discharge instructions.  It is important that you attend this appointment to make sure your symptoms are under control.     2. Your recommended sodium intake is 4105-1588 mg daily.    3.  Weigh yourself every day.    4. Some exercise and activity is important to help keep your heart functioning and strong. Unless instructed not to exercise, you may walk at a slow to moderate pace for 10-15 minutes 2-3 days per week to start. Pace your activity to prevent shortness of breath or fatigue. Stop exercising if you develop chest pain, lightheadedness, or significant shortness of breath.       Call Your Cardiologist If:   You gain 2-3 pounds in one day or 5 pounds in one week.  You have more difficulty breathing.  You are getting more tired with normal activity.  You are more short of breath lying down, or awaken at night short of breath.  You have swelling of your feet or legs.  You urinate less often during the day and more often at night.  You have cramps in your legs.  You have blurred vision or see yellowish-green halos around objects of lights.    Go to the Emergency Room If:   You have pain or tightness  in your chest  You are extremely short of breath  You are coughing up pink-frothy mucus  You are traveling and develop symptoms of worsening heart failure      ** Please follow up with your cardiologist or Advanced Practice Provider as written on your discharge instructions. If you are not provided with an appointment, let your nurse know so you can get an appointment**         [facility]   [address] [place] [address] [address] [place] [address]  Phone: [phone]  Fax: [phone]

## 2024-10-28 NOTE — CM/SW NOTE
Anticipated therapy need for pt is HH.    SW sent referral in Aidin. Awaiting responses. Will provide pt with options list when available.     to remain available for support and/or discharge planning.    PABLO Gregory  Discharge Planner  [phone]

## 2024-10-28 NOTE — PROGRESS NOTES
[facility]   [facility]    Nephrology Progress Note    Mingo White Attending:  Rehana Sanford*     Cc: christine, hyperkalemia    SUBJECTIVE     No acute complaints.    PHYSICAL EXAM   Vital signs: /35 (BP Location: Right arm)   Pulse 63   Temp 97.7 °F (36.5 °C) (Oral)   Resp 17   Wt 134 lb 11.2 oz (61.1 kg)   SpO2 97%   BMI 21.74 kg/m²   Temp (24hrs), Av.8 °F (36.6 °C), Min:97 °F (36.1 °C), Max:98.4 °F (36.9 °C)       Intake/Output Summary (Last 24 hours) at 10/28/2024 1521  Last data filed at 10/28/2024 1030  Gross per 24 hour   Intake 320 ml   Output 800 ml   Net -480 ml     Wt Readings from Last 3 Encounters:   10/28/24 134 lb 11.2 oz (61.1 kg)   11/15/23 113 lb 1.6 oz (51.3 kg)   22 124 lb (56.2 kg)     General: NAD  HEENT: NCAT  Cardiac: RRR  Lungs: no distress  Abdomen: soft, non-tender  Extremities: No edema  Neurologic: awake, alert  Skin: warm and dry     MEDS     Current Facility-Administered Medications   Medication Dose Route Frequency    furosemide (Lasix) tab 40 mg  40 mg Oral Daily    docusate sodium (Colace) cap 100 mg  100 mg Oral BID    polyethylene glycol (PEG 3350) (Miralax) 17 g oral packet 17 g  17 g Oral Daily PRN    bisacodyl (Dulcolax) 10 MG rectal suppository 10 mg  10 mg Rectal Daily PRN    amiodarone (Pacerone) tab 200 mg  200 mg Oral BID with meals    ipratropium-albuterol (Duoneb) 0.5-2.5 (3) MG/3ML inhalation solution 3 mL  3 mL Nebulization Q4H PRN    ondansetron (Zofran) 4 MG/2ML injection 4 mg  4 mg Intravenous Q6H PRN    atorvastatin (Lipitor) tab 40 mg  40 mg Oral Nightly    insulin degludec (Tresiba) 100 units/mL flextouch 10 Units  10 Units Subcutaneous Daily    heparin (Porcine) 5000 UNIT/ML injection 5,000 Units  5,000 Units Subcutaneous Q8H JENNIFFER    acetaminophen (Tylenol) tab 650 mg  650 mg Oral Q4H PRN    Or    HYDROcodone-acetaminophen (Norco) 5-325 MG per tab 1 tablet  1 tablet Oral Q4H PRN    Or    HYDROcodone-acetaminophen  (Norco) 5-325 MG per tab 2 tablet  2 tablet Oral Q4H PRN    metoclopramide (Reglan) 5 mg/mL injection 5 mg  5 mg Intravenous Q8H PRN    albuterol (Ventolin HFA) 108 (90 Base) MCG/ACT inhaler 2 puff  2 puff Inhalation Q4H PRN    aspirin DR tab 81 mg  81 mg Oral Nightly    carvedilol (Coreg) tab 12.5 mg  12.5 mg Oral BID    clopidogrel (Plavix) tab 75 mg  75 mg Oral Daily    pantoprazole (Protonix) DR tab 40 mg  40 mg Oral QAM AC    insulin aspart (NovoLOG) 100 Units/mL FlexPen 2-10 Units  2-10 Units Subcutaneous TID AC and HS    budesonide (Pulmicort) 0.5 MG/2ML nebulizer suspension 0.5 mg  0.5 mg Nebulization 2 times daily    arformoterol (Brovana) 15 MCG/2ML nebulizer solution 15 mcg  15 mcg Nebulization 2 times daily    fentaNYL (Sublimaze) 50 mcg/mL injection 25 mcg  25 mcg Intravenous Q5 Min PRN       LABS     Lab Results   Component Value Date    WBC 10.8 10/28/2024    HGB 9.4 10/28/2024    HCT 28.0 10/28/2024    .0 10/28/2024    CREATSERUM 2.26 10/28/2024    BUN 93 10/28/2024     10/28/2024    K 3.7 10/28/2024     10/28/2024    CO2 22.0 10/28/2024    GLU 96 10/28/2024    CA 8.6 10/28/2024    ALB 3.0 10/28/2024    MG 2.6 10/28/2024    PHOS 3.9 10/28/2024    PGLU 196 10/28/2024       IMAGING     Imaging studies personally reviewed.    ASSESSMENT & PLAN     79 year old male former smoker w ho COPD, CHF, DM, CAD, CKD3-4 (bl Cr 1.9-2.2) who presented to ED w SOB and defirillator firing.      Hyperkalemia - improved  -- s/p lokelma  -- Glucose control   -- albuterol per pulm   -- low K diet   -- continue diuretics per cardiology recommendations.      MAIA on CKD3  -- ok to restart gentle diuresis prn  -- strict UOP   -- avoid nephrotoxins and renally dose meds      Anemia/thrombocytopenia  -- %sat 19  -- iron replacement       Okay for discharge from nephrology perspective. Hold home lisinopril on discharge in setting of hyperkalemia. Would also hold home metformin on discharge in setting of GFR  <30. Diuretics per Cardiology.        Josr Abreuiaz, DO  [facility]  Nephrology

## 2024-10-28 NOTE — PROGRESS NOTES
10/27/24 1845   Mobility   O2 walk? Yes   SPO2% on Room Air at Rest 96   SPO2% Ambulation on Room Air 91     Pt ambulated, needed to sit for a few minutes FPC due to SOB again.    Progress Note- UROLOGY    SUBJECTIVE:  No c/o     MEDICATIONS  (STANDING):  amLODIPine   Tablet 5 milliGRAM(s) Oral daily  finasteride 5 milliGRAM(s) Oral daily  gabapentin 300 milliGRAM(s) Oral two times a day  labetalol 200 milliGRAM(s) Oral every 12 hours  mirtazapine 7.5 milliGRAM(s) Oral daily  nystatin Powder 1 Application(s) Topical every 8 hours  polyethylene glycol 3350 17 Gram(s) Oral at bedtime  senna 2 Tablet(s) Oral at bedtime  tamsulosin 0.4 milliGRAM(s) Oral at bedtime    MEDICATIONS  (PRN):  acetaminophen   Tablet .. 650 milliGRAM(s) Oral every 6 hours PRN Temp greater or equal to 38C (100.4F)  bisacodyl Suppository 10 milliGRAM(s) Rectal daily PRN Constipation      Vital Signs Last 24 Hrs  T(C): 36.4 (09 Oct 2020 05:00), Max: 36.8 (08 Oct 2020 23:39)  T(F): 97.6 (09 Oct 2020 05:00), Max: 98.2 (08 Oct 2020 23:39)  HR: 100 (09 Oct 2020 05:00) (71 - 100)  BP: 129/74 (09 Oct 2020 05:00) (95/55 - 172/66)  BP(mean): --  RR: 18 (09 Oct 2020 05:00) (18 - 20)  SpO2: 100% (09 Oct 2020 05:00) (92% - 100%)    PHYSICAL EXAM:      Constitutional: Appears comfortable in NAD    Gastrointestinal: SNTND    Genitourinary: NO change in penile exam- Ma in place- punch colored urine in the bag but dark pink clear urine in the Ma tubing      I&O's Detail    08 Oct 2020 07:01  -  09 Oct 2020 07:00  --------------------------------------------------------  IN:  Total IN: 0 mL    OUT:    Indwelling Catheter - Urethral (mL): 700 mL    Stool (mL): 2 mL  Total OUT: 702 mL    Total NET: -702 mL          LABS:                        7.5    9.59  )-----------( 302      ( 09 Oct 2020 08:43 )             24.0     10-08    144  |  111<H>  |  7   ----------------------------<  84  3.3<L>   |  27  |  1.13    Ca    8.9      08 Oct 2020 07:21  Phos  2.9     10-08  Mg     1.5     10-08

## 2024-10-28 NOTE — CM/SW NOTE
10/28/24 8849   Choice of Post-Acute Provider   Informed patient of right to choose their preferred provider Yes   List of appropriate post-acute services provided to patient/family with quality data Yes   Patient/family choice [facility]   Information given to Patient     SW met with pt and family at bedside to discuss HH choice.     Pt and family informed SW that they chose [facility].    DWIGHT reserved [facility] in Aidin and sent them a message notifying them of DC for today.     to remain available for support and/or discharge planning.    PABLO Gregory  Discharge Planner  [phone]

## 2024-10-28 NOTE — PROGRESS NOTES
[facility]  Hospitalist Progress Note                                                                     [facility]   part of [facility]        [facility]  6/5/1945    SUBJECTIVE:  Seen and examined at bedside.  Feeling better, still c/o LUE swelling.  No F/C, N/V.         OBJECTIVE:  Temp:  [97 °F (36.1 °C)-98.4 °F (36.9 °C)] 97.6 °F (36.4 °C)  Pulse:  [60-63] 60  Resp:  [17-20] 18  BP: ()/(37-50) 111/37  SpO2:  [94 %-98 %] 95 %  Exam  Gen: No acute distress, alert and oriented x3, no focal neurologic deficits, pale and weak  Pulm: Scattered wheezing, intermittent rattles noted, productive cough, off O2  CV: Heart with regular rate and rhythm, no murmur.  Normal PMI.    Abd: Abdomen soft, nontender, nondistended, no organomegaly, bowel sounds present  MSK: Full range of motion in extremities, no clubbing, no cyanosis, gait not assessed. +LUE swelling.   Skin: no rashes or lesions    Labs:   Recent Labs   Lab 10/23/24  0509 10/24/24  0604 10/26/24  0607 10/27/24  0742 10/28/24  0623   WBC 10.3 10.9 11.8* 9.9 10.8   HGB 11.2* 10.9* 10.3* 9.8* 9.4*   MCV 88.3 87.6 90.7 85.7 87.8   .0* 111.0* 111.0* 102.0* 116.0*       Recent Labs   Lab 10/24/24  0605 10/25/24  0605 10/26/24  0607 10/27/24  0742 10/28/24  0623    140 140 138 137   K 4.0 4.0 4.2 4.0 3.7    111 111 110 108   CO2 23.0 22.0 21.0 22.0 22.0   BUN 70* 77* 77* 86* 93*   CREATSERUM 2.17* 1.95* 2.07* 2.17* 2.26*   CA 9.3 8.8 8.7 8.9 8.6*   MG 2.4 2.6 2.6 2.7* 2.6   PHOS 4.9 4.9 5.2* 4.5 3.9   * 109* 128* 141* 96       Recent Labs   Lab 10/24/24  0605 10/25/24  0605 10/26/24  0607 10/27/24  0742 10/28/24  0623   ALB 3.6 3.3 3.2 3.0* 3.0*       Recent Labs   Lab 10/27/24  0542 10/27/24  1258 10/27/24  1613 10/27/24  2057 10/28/24  0621   PGLU 150* 236* 154* 198* 106*       Meds:   Scheduled:    furosemide  40 mg Oral Daily    docusate sodium  100 mg Oral BID     amiodarone  200 mg Oral BID with meals    atorvastatin  40 mg Oral Nightly    insulin degludec  10 Units Subcutaneous Daily    heparin  5,000 Units Subcutaneous Q8H JENNIFFER    aspirin  81 mg Oral Nightly    carvedilol  12.5 mg Oral BID    clopidogrel  75 mg Oral Daily    pantoprazole  40 mg Oral QAM AC    insulin aspart  2-10 Units Subcutaneous TID AC and HS    budesonide  0.5 mg Nebulization 2 times daily    arformoterol  15 mcg Nebulization 2 times daily     Continuous Infusions:         PRN:   polyethylene glycol (PEG 3350)    bisacodyl    ipratropium-albuterol    ondansetron    acetaminophen **OR** HYDROcodone-acetaminophen **OR** HYDROcodone-acetaminophen    metoclopramide    albuterol    fentaNYL    Assessment/Plan:  Principal Problem:    Ventricular tachyarrhythmia (HCC)  Active Problems:    V-tach (HCC)    Ischemic cardiomyopathy with implantable cardioverter-defibrillator (ICD)    79 year old male with PMH sig for ischemic cardiomyopathy with an EF of 25%, history of VT status post AICD, chronic systolic heart failure, AAA status post EVAR, diabetes mellitus type 2, dyslipidemia, left renal artery stenosis presented after his ICD fired 4 times.      Ventricular tachycardia with appropriate shocks  S/p DCICD  - cont amio infusion, wean per cardiology  - coreg  - echo reviewed  - Evaluated by electrophysiologist, status post defibrillator generator replacement 10/25, unable to upgrade to CRT given anatomy per EP notes  - further management per cardiology      Left upper extremity swelling  - Acute on chronic  - In the setting of recent pacemaker surgery, plan for left upper extremity Dopplers  - IV diuresis per nephrology    Acute Hypercapnic Respiratory failure from acute COPD exacerbation-resolved  - pulmonology following  -Off BiPAP,  -IV steroids tapered off to p.o.  - budesonide/brovana     ICMP   Chronic systolic heart failure  - cardiology following  - further diuresis per cardiology/nephrology   - daily  weights, I/Os  - coreg  -2D echo with EF 15-20% with severe wall motion abnormality, see echo report for details,-this was previously noted on echo  - maximize GDMT as bp permits  - Given ectopy and multiple other risk factors including smoking, ischemic evaluation is warranted.   -Status post cardiac catheterization 10/24-status post PCI/NORRIS to OM    Acute kidney injury on CKD 3 with hyperkalemia  - s/p lokelma x3 doses per nephrology   - hold lisinopril   - I/Os, UOP  - Creatinine  - proBNP elevated  - Will defer diuresis to nephrology  - nephrology following, case discussed     DM2 with hyperglycemia  - ISS/accucheks  - Worsened given steroids, BG trend reviewed, within range  - Continue Tresiba 10 units daily     Hyperlipidemia  - statin     AAA s/p EVAR  - stable     Dispo: Pending clinical status, increase mobility    FEN: regular diet, PT/OT-to reevaluate  Proph: SCDs, heparin  Code status: Full code     Likely discharge home with home care pending LUE venous doppler.     Ming Ashley DO  Hospitalist  [facility]

## 2024-10-28 NOTE — OCCUPATIONAL THERAPY NOTE
OCCUPATIONAL THERAPY EVALUATION - INPATIENT     Room Number: 2601/2601-A  Evaluation Date: 10/28/2024  Type of Evaluation: Re-eval   Presenting Problem: V tach, ICD firing    Physician Order: IP Consult to Occupational Therapy  Reason for Therapy: ADL/IADL Dysfunction and Discharge Planning    Pt with pending US to R/O UE DVT, spoke to Dion MANDUJANO, who approved pt for session with WBAT on UE.  Rn aware and in agreement.    OCCUPATIONAL THERAPY ASSESSMENT   Patient is currently functioning near baseline with toileting, bathing, grooming, static standing balance, dynamic standing balance, functional standing tolerance, and energy conservation strategies. Prior to admission, patient's baseline is IND w/ no mobility devices.  Patient is requiring sup for functional transfers, bed mobility, balance, & seated LB dressing. Pt. Is also requiring CGA for toileting & grooming due to standing tolerance. as a result of the following impairments: decreased functional strength, decreased endurance, impaired coordination, decreased muscular endurance, and standing tolerance . Occupational Therapy will continue to follow for duration of hospitalization.    Patient will benefit from continued skilled OT Services for duration of hospitalization, however, given the patient is functioning near baseline level do not anticipate skilled therapy needs at discharge     History Related to Current Admission: Patient is a 79 year old male admitted on 10/19/2024 with Presenting Problem: V tach, ICD firing. Co-Morbidities : ischemic cardiomyopathy with an EF of 25%, history of VT status post AICD, chronic systolic heart failure, AAA status post EVAR, diabetes mellitus type 2, dyslipidemia, left renal artery stenosis    WEIGHT BEARING RESTRICTION       Recommendations for nursing staff:   Transfers: Sup w/ RW.  Toileting location: toilet    EVALUATION SESSION:  Patient Start of Session: Semi-supine in bed.     FUNCTIONAL TRANSFER ASSESSMENT  Sit  to Stand: Edge of Bed; Chair  Edge of Bed: Supervision  Chair: Supervision    BED MOBILITY  Supine to Sit : Supervision  Sit to Supine (OT): Not Tested  Scooting: scooted to EOB w/ SUP.    BALANCE ASSESSMENT  Static Sitting: Supervision  Static Standing: Supervision    FUNCTIONAL ADL ASSESSMENT  LB Dressing Seated: Supervision (Pt. demonstarted LB dressing by completing figure 4 position on RLE.)  Toileting Standing: Contact Guard Assist (Pt. simulated toilet transfer by completing sit to stand/stand to sit transfer from a chair w/ RW. Pt. did demomtsrated weakness in LLE when standing/ ambulating for several minutes.)    ACTIVITY TOLERANCE: At start if session pt. BP was taken while he was sitting & standing. Seated: 105/45 & Standing: SBP of 84. Pt. Stated that he felt fine. Spoke with RN and MD, pt okay to walk with therapy with lower BP just watch for symptoms.  Pt. Began to ambulate into hallway for several minutes before requiring a seated break due to pain in his LLE. Pt. Sat for several mins before requiring another seated break. After second seated break pt. Was able to ambulate to room. Pt. O2 levels stated with the 90's for entire session.                          O2 SATURATIONS       COGNITION  Overall Cognitive Status:  WFL - within functional limits    Upper Extremity   ROM: within functional limits   Strength: within functional limits   Coordination  Gross motor: WNF   Fine motor: WNF  Sensation: Light touch:  intact    EDUCATION PROVIDED  Patient Education : Role of Occupational Therapy; Plan of Care  Patient's Response to Education: Verbalized Understanding  Family/Caregiver Education : Role of Occupational Therapy; Plan of Care  Family/Caregiver's Response to Education: Verbalized Understanding    Equipment used: RW   Demonstrates functional use, Would benefit from additional trial      Therapist comments: When pt. Was experiecing pain in LLE he would WS to RLE.     Patient End of Session: Up in  chair;Needs met;Call light within reach;All patient questions and concerns addressed;Hospital anti-slip socks;Alarm set    OCCUPATIONAL PROFILE    HOME SITUATION  Type of Home: House ([facility])  Home Layout: One level  Lives With: Spouse    Toilet and Equipment: Standard height toilet;Comfort height toilet (Std. toilet in pt. room & comfort in pt. [facility] [place], [place].)  Shower/Tub and Equipment: Tub-shower combo;Walk-in shower;Shower chair (Tub shower is within pt. home & walk-in shower/ shower chair is within pt. [facility] [place].)             Drives: Yes  Patient Regularly Uses: Reading glasses;Rolling walker (wife has a RW from a knee injury but pt would be able to use if needed)    Prior Level of Function: Lives at [facility] w/ wife & is IND w/ all BADL's. Regularly exercises at gym in [facility].     SUBJECTIVE   Pt. Was cooperative & pleasant.     PAIN ASSESSMENT  Rating: Unable to rate  Location: No pain stated at this time       OBJECTIVE  Precautions: Bed/chair alarm  Fall Risk: Standard fall risk    ASSESSMENTS    AM-PAC ‘6-Clicks’ Inpatient Daily Activity Short Form  -   Putting on and taking off regular lower body clothing?: A Little  -   Bathing (including washing, rinsing, drying)?: A Little  -   Toileting, which includes using toilet, bedpan or urinal? : None  -   Putting on and taking off regular upper body clothing?: None  -   Taking care of personal grooming such as brushing teeth?: A Little  -   Eating meals?: None    AM-PAC Score:  Score: 21  Approx Degree of Impairment: 32.79%  Standardized Score (AM-PAC Scale): 44.27    ADDITIONAL TESTS     NEUROLOGICAL FINDINGS      COGNITION ASSESSMENTS     PLAN     OT Treatment Plan: Balance activities;Energy conservation/work simplification techniques;ADL training;Endurance training;Compensatory technique education  Rehab Potential : Good  Frequency: 3-5x/week  Number of Visits to Meet Established Goals: 4    ADL Goals   Patient will perform  grooming: with supervision and while standing at sink  Patient will perform toileting: with supervision and with grab bars    Functional Transfer Goals  Patient will ambulate: to toilet without breaks.   Patient will transfer to toilet:  with supervision    UE Exercise Program Goal  Patient will be supervision with bilateral AROM HEP (home exercise program).    Patient Evaluation Complexity Level:   Occupational Profile/Medical History LOW - Brief history including review of medical or therapy records    Specific performance deficits impacting engagement in ADL/IADL LOW  1 - 3 performance deficits    Client Assessment/Performance Deficits LOW - No comorbidities nor modifications of tasks    Clinical Decision Making LOW - Analysis of occupational profile, problem-focused assessments, limited treatment options    Overall Complexity LOW     OT Session Time: 25 minutes  Self-Care Home Management: 7 minutes  Therapeutic Activity: 8 minutes  Neuromuscular Re-education: 0 minutes  Therapeutic Exercise: 0 minutes  Cognitive Skills: 0 minutes  Sensory Integrative: 0 minutes  Orthotic Management and Trainin minutes  Can add/delete any of these

## 2024-10-29 NOTE — PAYOR COMM NOTE
--------------  DISCHARGE REVIEW    Payor: [employer]  Subscriber #:  [id number]  Authorization Number: [id number]    Admit date: 10/20/24  Admit time:   1:31 AM  Discharge Date: 10/28/2024  6:50 PM     Admitting Physician:   Attending Physician:  Annalise att. providers found  Primary Care Physician: Daren Espino MD          Discharge Summary Notes        Discharge Summary signed by Ming Ashley DO at 10/28/2024  3:58 PM       Author: Ming Ashley DO Specialty: Internal Medicine Author Type: Physician    Filed: 10/28/2024  3:58 PM Date of Service: 10/28/2024 11:13 AM Status: Addendum    : Ming Ashley DO (Physician)    Related Notes: Original Note by Ming Ashley DO (Physician) filed at 10/28/2024  3:30 PM                                                          [facility] Internal Medicine Hospitalist Discharge Summary     Patient ID:  Mingo White  79 year old  6/5/1945    Admit date: 10/19/2024    Discharge date and time: 10/28/2024    Attending Physician: Ming Ashley DO     Primary Care Physician: Daren Espino MD     Discharge Diagnoses:   Ventricular tachycardia with appropriate shocks  S/p DCICD  CAD s/p CABG 2006 (LIMA-LAD, SVG-RCA) s/p recent PCI to OM1 10/24/24   Ischemic cardiomyopathy   Chronic systolic HF  MAIA on CKD 3  DM II  HLD    Please note that only IHP DMG and EMG patients enrolled in the Medicare ACO, [facility] ACO and [facility] HMOs will be handled by the [facility] Care Management team.  For all other patients, please follow usual protocol for discharge care transition.    Discharge Condition: stable    Disposition:  Home with home care     Important Follow up:  - PCP: within 3 days   - Consults: Rylan, pulm, EP    Follow Up Items:  Per rylan    Hospital Course:      79 year old male with PMH sig for ischemic cardiomyopathy with an EF of 25%, history of VT status post AICD, chronic systolic heart failure, AAA status post EVAR, diabetes mellitus type 2,  dyslipidemia, left renal artery stenosis presented after his ICD fired 4 times.      Ventricular tachycardia with appropriate shocks  S/p DCICD  - cont amio infusion, wean per cardiology  - coreg  - echo reviewed  - Evaluated by electrophysiologist, status post defibrillator generator replacement 10/25, unable to upgrade to CRT given anatomy per EP notes  - further management per cardiology      Left upper extremity swelling  - Acute on chronic  - In the setting of recent pacemaker surgery, plan for left upper extremity Dopplers  - IV diuresis per nephrology     Acute Hypercapnic Respiratory failure from acute COPD exacerbation-resolved  - pulmonology following  -Off BiPAP,  -IV steroids tapered off to p.o.  - budesonide/brovana      ICMP   Chronic systolic heart failure  - cardiology following  - further diuresis per cardiology/nephrology   - daily weights, I/Os  - coreg  - hold ACE-I given renal function   -2D echo with EF 15-20% with severe wall motion abnormality, see echo report for details,-this was previously noted on echo  - maximize GDMT as bp permits  - Given ectopy and multiple other risk factors including smoking, ischemic evaluation is warranted.   -Status post cardiac catheterization 10/24-status post PCI/NORRIS to OM     Acute kidney injury on CKD 3 with hyperkalemia  - s/p lokelma x3 doses per nephrology   - hold lisinopril   - I/Os, UOP  - Creatinine  - proBNP elevated  - Will defer diuresis to nephrology  - nephrology following, case discussed     DM2 with hyperglycemia  - ISS/accucheks  - Worsened given steroids, BG trend reviewed, within range  - s/p Tresiba 10 units daily  - dc home metformin given yessy function  - discharge home on novolog ISS, f/u with PCP     Hyperlipidemia  - statin     AAA s/p EVAR  - stable     Stable for discharge home with home care.     Consults: IP CONSULT TO HOSPITALIST  IP CONSULT TO NEPHROLOGY  IP CONSULT TO PULMONOLOGY  IP CONSULT TO CARDIOLOGY    Operative  Procedures:  [facility]      Patient instructions:      I as the attending physician reconciled the current and discharge medications on day of discharge.        Discharge Medications        START taking these medications        Instructions Prescription details   acetaminophen 325 MG Tabs  Commonly known as: Tylenol      Take 2 tablets (650 mg total) by mouth every 6 (six) hours as needed for Pain or Fever.   Quantity: 30 tablet  Refills: 0     amiodarone 200 MG Tabs  Commonly known as: Pacerone      Take 1 tablet (200 mg total) by mouth 2 (two) times daily with meals.   Quantity: 90 tablet  Refills: 0     NovoLOG FlexPen 100 UNIT/ML Sopn  Generic drug: insulin aspart      Test blood glucose 3 times daily with meals Inject 1 unit if blood glucose is between 141-180 mg/dL Inject 2 units if blood glucose is between 181-220 mg/dL Inject 3 units if blood glucose is between 221-260 mg/dL Inject 4 units if blood glucose is between 261-300 mg/dL Inject 5 units if blood glucose is between 301-350 mg/dL Call your physician if blood glucose is greater than 351 mg/dL,   Quantity: 5 each  Refills: 3     OneTouch Verio Flex System w/Device Kit      Test blood sugar 3 times per day.   Quantity: 1 kit  Refills: 0     pantoprazole 40 MG Tbec  Commonly known as: Protonix  Replaces: Esomeprazole Magnesium 40 MG Cpdr      Take 1 tablet (40 mg total) by mouth every morning before breakfast.   Quantity: 90 tablet  Refills: 0     umeclidinium-vilanterol 62.5-25 MCG/ACT Aepb  Commonly known as: Anoro Ellipta      Inhale 1 puff into the lungs daily.   Quantity: 1 each  Refills: 0            CHANGE how you take these medications        Instructions Prescription details   atorvastatin 40 MG Tabs  Commonly known as: Lipitor  What changed:   medication strength  how much to take      Take 1 tablet (40 mg total) by mouth nightly.   Quantity: 30 tablet  Refills: 1     furosemide 40 MG Tabs  Commonly known as: Lasix  Start taking on: October 29,  2024  What changed:   medication strength  how much to take      Take 1 tablet (40 mg total) by mouth daily.   Quantity: 60 tablet  Refills: 1     Prodigy Lancets 28G Misc  What changed: Another medication with the same name was added. Make sure you understand how and when to take each.      Test once daily   Quantity: 100 each  Refills: 3     OneTouch Delica Lancets 33G Misc  What changed: You were already taking a medication with the same name, and this prescription was added. Make sure you understand how and when to take each.      Test blood sugar 3 times per day.   Quantity: 100 each  Refills: 6     Prodigy No Coding Blood Gluc Strp  What changed: Another medication with the same name was added. Make sure you understand how and when to take each.      Use as directed to test blood sugar once daily   Quantity: 100 strip  Refills: 3     OneTouch Verio Strp  What changed: You were already taking a medication with the same name, and this prescription was added. Make sure you understand how and when to take each.      Test blood sugar 3 times per day.   Quantity: 100 strip  Refills: 6            CONTINUE taking these medications        Instructions Prescription details   albuterol 108 (90 Base) MCG/ACT Aers  Commonly known as: Ventolin HFA      Inhale 2 puffs into the lungs every 4 (four) hours as needed for Wheezing.   Quantity: 1 each  Refills: 0     aspirin 81 MG Tabs      Take 1 tablet (81 mg total) by mouth at bedtime.   Refills: 0     carvedilol 12.5 MG Tabs  Commonly known as: Coreg      Take 1 tablet (12.5 mg total) by mouth 2 (two) times daily.   Quantity: 180 tablet  Refills: 3     clopidogrel 75 MG Tabs  Commonly known as: Plavix      TAKE 1 TABLET DAILY   Quantity: 90 tablet  Refills: 3     Lancet Device Misc      MEASURE HOME GLUCOSE DAILY --DIRECTED   Quantity: 1 each  Refills: 11            STOP taking these medications      digoxin 0.125 MG Tabs  Commonly known as: Lanoxin        Esomeprazole  Magnesium 40 MG Cpdr  Commonly known as: NEXIUM  Replaced by: pantoprazole 40 MG Tbec        lisinopril 5 MG Tabs  Commonly known as: Prinivil; Zestril        metFORMIN 500 MG Tabs  Commonly known as: Glucophage        predniSONE 20 MG Tabs  Commonly known as: Deltasone                  Where to Get Your Medications        These medications were sent to [facility] AT Portal to Registered [facility] Sites, [phone], [phone]  [facility], [place] [address]      Phone: [phone]   amiodarone 200 MG Tabs  atorvastatin 40 MG Tabs  furosemide 40 MG Tabs  pantoprazole 40 MG Tbec       These medications were sent to [id number] - [place], [place] - [address] [phone], [phone]  [address] [place] [address] [place] [address]      Phone: [phone]   NovoLOG FlexPen 100 UNIT/ML Sopn  OneTouch Delica Lancets 33G Misc  OneTouch Verio Flex System w/Device Kit  OneTouch Verio Strp  umeclidinium-vilanterol 62.5-25 MCG/ACT Aepb       Information about where to get these medications is not yet available    Ask your nurse or doctor about these medications  acetaminophen 325 MG Tabs               Activity: activity as tolerated  Diet: cardiac diet  Wound Care: as directed  Code Status: Prior      Exam on day of discharge:     Vitals:    10/28/24 0803   BP: 111/37   Pulse: 60   Resp: 18   Temp: 97.6 °F (36.4 °C)       Gen: No acute distress, alert and oriented x3, no focal neurologic deficits, pale and weak  Pulm: Scattered wheezing, intermittent rattles noted, productive cough, off O2  CV: Heart with regular rate and rhythm, no murmur.  Normal PMI.    Abd: Abdomen soft, nontender, nondistended, no organomegaly, bowel sounds present  MSK: Full range of motion in extremities, no clubbing, no cyanosis, gait not assessed. +LUE swelling.   Skin: no rashes or lesions      Total time coordinating care for discharge: Greater than 30  amalia Ashley DO  [facility]       Electronically signed by Ming Ashley DO on 10/28/2024  3:58 PM         REVIEWER COMMENTS

## 2024-10-29 NOTE — CDS QUERY
Related to procedure      Dear Dr. Pedroza :  Can the relationship between the diagnosis of coronary sinus dissection and the Defibrillator Generator Change Procedure on 10/25/24 be further clarified?  [  ] Coronary Sinus Dissection related to procedure  [  ] Coronary Sinus Dissection not related to procedure.    [   ] Clinically unable to determine    ____________________________________________________________________________________________  Clinical Indicators/Treatment:   ___10/25 Procedure Report: 79-year-old male with an ischemic cardiomyopathy, class 3 chronic systolic congestive heart failure IVCD with , s/p dual chamber ICD who presents for an upgrade to a BiVICD.  An incision was made over the old device below the left clavicle with a scalpel and the plane of dissection was extended until the oldf device was exposed. Access to the left subclavian vein was obtained once using a micro puncture needle via the extrathoracic approach. The CS was cannulated and a balloon occlusive venogram was performed. There was a small lateral branck and a posterolateral branch. Attempts to place 2 different leads in the lateral branch could not get the lead far enough distally to be secure despite using inner guides and stiffer wires. The lead was placed in the posterolateral branch, but this was quite low and went to the apex. At sites more basal, the BiV paced QRS was very wide around 190ms even with trying different VV timings.  It was decided to try a LBBAP lead. The RV lead was placed through a steerable sheath and later attempts with a fixed sheath and was used to map the RV septum for a location where paced complexes had a notch on the downstroke in V1, discordant aVR and AVL, and lead 2 more positive than lead 3. I could not find a site with a good discordant aVR and AVL. This was generally about 1. 5cm from the His in the RITTER position. At a suitable location, the lead was deployed. However, I could not  get the lead to bore deep into the septum. This was despite multiple locations. I asked Mario to come and tried their pacing lead, which has a stylet. Using their system, with a yellow stiff stylet, I still could not bore the lead into the septum. Attempts to get back to the CS showed that the lateral branch had a dissection. At this point, the patient was no longer tolerating the procedure well from a respiratory standpoint. It was decided to just change the generator.   It was decided to just change the generator. A figure 8 suture was placed around the sheath and it was removed. The pocket was irrigated with antibiotic solution. The leads were connected to the new pulse generator in an antibiotic pouch and placed in the pocket. Neither [employer] or [employer] had a dual chamber ICD that would accommodate a DF1 lead. Therefore a CRT-D generator  was used and the LV port was plugged.      Potential Risk Factors: 10/25: Defibrillator generator change.                Use of terms such as suspected, possible, or probable (associated with a specific diagnosis that is being evaluated, monitored, or treated as if it exists) are acceptable and can be coded in the inpatient setting, when documented at the time of discharge.      Please add any additional documentation to your progress note and continue to document this through discharge.     Thank you.  For questions regarding this query, please contact Clinical : Tianna Glynn RN CCDS [phone]  This document is a permanent part of the medical record.

## 2024-11-04 ENCOUNTER — HOSPITAL ENCOUNTER (INPATIENT)
Facility: HOSPITAL | Age: 79
LOS: 1 days | Discharge: HOME HEALTH | End: 2024-11-06
Attending: EMERGENCY MEDICINE | Admitting: INTERNAL MEDICINE
Payer: MEDICARE

## 2024-11-04 ENCOUNTER — APPOINTMENT (OUTPATIENT)
Dept: GENERAL RADIOLOGY | Facility: HOSPITAL | Age: 79
End: 2024-11-04
Payer: MEDICARE

## 2024-11-04 ENCOUNTER — HOSPITAL ENCOUNTER (INPATIENT)
Facility: HOSPITAL | Age: 79
LOS: 1 days | Discharge: HOME HEALTH | DRG: 291 | End: 2024-11-06
Attending: EMERGENCY MEDICINE | Admitting: INTERNAL MEDICINE
Payer: MEDICARE

## 2024-11-04 ENCOUNTER — APPOINTMENT (OUTPATIENT)
Dept: GENERAL RADIOLOGY | Facility: HOSPITAL | Age: 79
DRG: 291 | End: 2024-11-04
Payer: MEDICARE

## 2024-11-04 DIAGNOSIS — Z71.89 GOALS OF CARE, COUNSELING/DISCUSSION: ICD-10-CM

## 2024-11-04 DIAGNOSIS — I50.9 ACUTE CONGESTIVE HEART FAILURE, UNSPECIFIED HEART FAILURE TYPE (HCC): Primary | ICD-10-CM

## 2024-11-04 DIAGNOSIS — D64.9 ANEMIA, UNSPECIFIED TYPE: ICD-10-CM

## 2024-11-04 DIAGNOSIS — I50.22 CHRONIC SYSTOLIC CONGESTIVE HEART FAILURE (HCC): ICD-10-CM

## 2024-11-04 DIAGNOSIS — I25.5 ISCHEMIC CARDIOMYOPATHY WITH IMPLANTABLE CARDIOVERTER-DEFIBRILLATOR (ICD): ICD-10-CM

## 2024-11-04 DIAGNOSIS — Z51.5 PALLIATIVE CARE ENCOUNTER: ICD-10-CM

## 2024-11-04 DIAGNOSIS — R79.89 ELEVATED TROPONIN: ICD-10-CM

## 2024-11-04 DIAGNOSIS — R09.02 HYPOXIA: ICD-10-CM

## 2024-11-04 DIAGNOSIS — Z95.810 ISCHEMIC CARDIOMYOPATHY WITH IMPLANTABLE CARDIOVERTER-DEFIBRILLATOR (ICD): ICD-10-CM

## 2024-11-04 DIAGNOSIS — D72.829 LEUKOCYTOSIS, UNSPECIFIED TYPE: ICD-10-CM

## 2024-11-04 LAB
BASOPHILS # BLD AUTO: 0.05 X10(3) UL (ref 0–0.2)
BASOPHILS NFR BLD AUTO: 0.3 %
EOSINOPHIL # BLD AUTO: 0.18 X10(3) UL (ref 0–0.7)
EOSINOPHIL NFR BLD AUTO: 0.9 %
ERYTHROCYTE [DISTWIDTH] IN BLOOD BY AUTOMATED COUNT: 16.8 %
HCT VFR BLD AUTO: 31.1 %
HGB BLD-MCNC: 10.2 G/DL
IMM GRANULOCYTES # BLD AUTO: 0.14 X10(3) UL (ref 0–1)
IMM GRANULOCYTES NFR BLD: 0.7 %
LYMPHOCYTES # BLD AUTO: 2.31 X10(3) UL (ref 1–4)
LYMPHOCYTES NFR BLD AUTO: 12.2 %
MCH RBC QN AUTO: 30.3 PG (ref 26–34)
MCHC RBC AUTO-ENTMCNC: 32.8 G/DL (ref 31–37)
MCV RBC AUTO: 92.3 FL
MONOCYTES # BLD AUTO: 1.05 X10(3) UL (ref 0.1–1)
MONOCYTES NFR BLD AUTO: 5.5 %
NEUTROPHILS # BLD AUTO: 15.27 X10 (3) UL (ref 1.5–7.7)
NEUTROPHILS # BLD AUTO: 15.27 X10(3) UL (ref 1.5–7.7)
NEUTROPHILS NFR BLD AUTO: 80.4 %
PLATELET # BLD AUTO: 134 10(3)UL (ref 150–450)
RBC # BLD AUTO: 3.37 X10(6)UL
WBC # BLD AUTO: 19 X10(3) UL (ref 4–11)

## 2024-11-04 PROCEDURE — 71045 X-RAY EXAM CHEST 1 VIEW: CPT | Performed by: EMERGENCY MEDICINE

## 2024-11-05 PROBLEM — D64.9 ANEMIA, UNSPECIFIED TYPE: Status: ACTIVE | Noted: 2024-11-05

## 2024-11-05 PROBLEM — I50.9 ACUTE CONGESTIVE HEART FAILURE, UNSPECIFIED HEART FAILURE TYPE (HCC): Status: ACTIVE | Noted: 2024-11-05

## 2024-11-05 PROBLEM — R79.89 ELEVATED TROPONIN: Status: ACTIVE | Noted: 2024-11-05

## 2024-11-05 LAB
ALBUMIN SERPL-MCNC: 3.4 G/DL (ref 3.2–4.8)
ALBUMIN/GLOB SERPL: 1.2 {RATIO} (ref 1–2)
ALP LIVER SERPL-CCNC: 74 U/L
ALT SERPL-CCNC: 19 U/L
ANION GAP SERPL CALC-SCNC: 7 MMOL/L (ref 0–18)
AST SERPL-CCNC: 21 U/L (ref ?–34)
ATRIAL RATE: 92 BPM
BILIRUB SERPL-MCNC: 0.7 MG/DL (ref 0.2–1.1)
BUN BLD-MCNC: 37 MG/DL (ref 9–23)
CALCIUM BLD-MCNC: 8.7 MG/DL (ref 8.7–10.4)
CHLORIDE SERPL-SCNC: 104 MMOL/L (ref 98–112)
CHOLEST SERPL-MCNC: 100 MG/DL (ref ?–200)
CO2 SERPL-SCNC: 24 MMOL/L (ref 21–32)
CREAT BLD-MCNC: 2.05 MG/DL
EGFRCR SERPLBLD CKD-EPI 2021: 32 ML/MIN/1.73M2 (ref 60–?)
EST. AVERAGE GLUCOSE BLD GHB EST-MCNC: 151 MG/DL (ref 68–126)
FLUAV + FLUBV RNA SPEC NAA+PROBE: NEGATIVE
FLUAV + FLUBV RNA SPEC NAA+PROBE: NEGATIVE
GLOBULIN PLAS-MCNC: 2.8 G/DL (ref 2–3.5)
GLUCOSE BLD-MCNC: 147 MG/DL (ref 70–99)
GLUCOSE BLD-MCNC: 162 MG/DL (ref 70–99)
GLUCOSE BLD-MCNC: 180 MG/DL (ref 70–99)
GLUCOSE BLD-MCNC: 212 MG/DL (ref 70–99)
HBA1C MFR BLD: 6.9 % (ref ?–5.7)
HDLC SERPL-MCNC: 40 MG/DL (ref 40–59)
LACTATE SERPL-SCNC: 1.3 MMOL/L (ref 0.5–2)
LDLC SERPL CALC-MCNC: 42 MG/DL (ref ?–100)
NONHDLC SERPL-MCNC: 60 MG/DL (ref ?–130)
NT-PROBNP SERPL-MCNC: ABNORMAL PG/ML (ref ?–450)
OSMOLALITY SERPL CALC.SUM OF ELEC: 295 MOSM/KG (ref 275–295)
P AXIS: 95 DEGREES
P-R INTERVAL: 208 MS
POTASSIUM SERPL-SCNC: 4 MMOL/L (ref 3.5–5.1)
PROCALCITONIN SERPL-MCNC: 0.09 NG/ML (ref ?–0.05)
PROT SERPL-MCNC: 6.2 G/DL (ref 5.7–8.2)
Q-T INTERVAL: 414 MS
QRS DURATION: 166 MS
QTC CALCULATION (BEZET): 511 MS
R AXIS: 118 DEGREES
RSV RNA SPEC NAA+PROBE: NEGATIVE
SARS-COV-2 RNA RESP QL NAA+PROBE: NOT DETECTED
SODIUM SERPL-SCNC: 135 MMOL/L (ref 136–145)
T AXIS: 90 DEGREES
TRIGL SERPL-MCNC: 95 MG/DL (ref 30–149)
TROPONIN I SERPL HS-MCNC: 68 NG/L
VENTRICULAR RATE: 92 BPM
VLDLC SERPL CALC-MCNC: 13 MG/DL (ref 0–30)

## 2024-11-05 RX ORDER — NICOTINE POLACRILEX 4 MG
30 LOZENGE BUCCAL
Status: DISCONTINUED | OUTPATIENT
Start: 2024-11-05 | End: 2024-11-06

## 2024-11-05 RX ORDER — CLOPIDOGREL BISULFATE 75 MG/1
75 TABLET ORAL DAILY
Status: DISCONTINUED | OUTPATIENT
Start: 2024-11-05 | End: 2024-11-06

## 2024-11-05 RX ORDER — POLYETHYLENE GLYCOL 3350 17 G/17G
17 POWDER, FOR SOLUTION ORAL DAILY PRN
Status: DISCONTINUED | OUTPATIENT
Start: 2024-11-05 | End: 2024-11-06

## 2024-11-05 RX ORDER — SENNOSIDES 8.6 MG
17.2 TABLET ORAL NIGHTLY PRN
Status: DISCONTINUED | OUTPATIENT
Start: 2024-11-05 | End: 2024-11-06

## 2024-11-05 RX ORDER — ASPIRIN 81 MG/1
81 TABLET, CHEWABLE ORAL NIGHTLY
Status: DISCONTINUED | OUTPATIENT
Start: 2024-11-05 | End: 2024-11-06

## 2024-11-05 RX ORDER — ENOXAPARIN SODIUM 100 MG/ML
30 INJECTION SUBCUTANEOUS DAILY
Status: DISCONTINUED | OUTPATIENT
Start: 2024-11-05 | End: 2024-11-06

## 2024-11-05 RX ORDER — BISACODYL 10 MG
10 SUPPOSITORY, RECTAL RECTAL
Status: DISCONTINUED | OUTPATIENT
Start: 2024-11-05 | End: 2024-11-06

## 2024-11-05 RX ORDER — FUROSEMIDE 10 MG/ML
40 INJECTION INTRAMUSCULAR; INTRAVENOUS DAILY
Status: DISCONTINUED | OUTPATIENT
Start: 2024-11-05 | End: 2024-11-05

## 2024-11-05 RX ORDER — AMIODARONE HYDROCHLORIDE 200 MG/1
200 TABLET ORAL 2 TIMES DAILY WITH MEALS
Status: DISCONTINUED | OUTPATIENT
Start: 2024-11-05 | End: 2024-11-06

## 2024-11-05 RX ORDER — CARVEDILOL 12.5 MG/1
12.5 TABLET ORAL 2 TIMES DAILY WITH MEALS
Status: DISCONTINUED | OUTPATIENT
Start: 2024-11-05 | End: 2024-11-06

## 2024-11-05 RX ORDER — ONDANSETRON 2 MG/ML
4 INJECTION INTRAMUSCULAR; INTRAVENOUS EVERY 6 HOURS PRN
Status: DISCONTINUED | OUTPATIENT
Start: 2024-11-05 | End: 2024-11-05

## 2024-11-05 RX ORDER — FUROSEMIDE 10 MG/ML
80 INJECTION INTRAMUSCULAR; INTRAVENOUS
Status: DISCONTINUED | OUTPATIENT
Start: 2024-11-05 | End: 2024-11-06

## 2024-11-05 RX ORDER — ACETAMINOPHEN 500 MG
500 TABLET ORAL EVERY 4 HOURS PRN
Status: DISCONTINUED | OUTPATIENT
Start: 2024-11-05 | End: 2024-11-06

## 2024-11-05 RX ORDER — ATORVASTATIN CALCIUM 40 MG/1
40 TABLET, FILM COATED ORAL NIGHTLY
Status: DISCONTINUED | OUTPATIENT
Start: 2024-11-05 | End: 2024-11-06

## 2024-11-05 RX ORDER — FUROSEMIDE 10 MG/ML
40 INJECTION INTRAMUSCULAR; INTRAVENOUS ONCE
Status: COMPLETED | OUTPATIENT
Start: 2024-11-05 | End: 2024-11-05

## 2024-11-05 RX ORDER — PANTOPRAZOLE SODIUM 40 MG/1
40 TABLET, DELAYED RELEASE ORAL
Status: DISCONTINUED | OUTPATIENT
Start: 2024-11-05 | End: 2024-11-06

## 2024-11-05 RX ORDER — NICOTINE POLACRILEX 4 MG
15 LOZENGE BUCCAL
Status: DISCONTINUED | OUTPATIENT
Start: 2024-11-05 | End: 2024-11-06

## 2024-11-05 RX ORDER — DEXTROSE MONOHYDRATE 25 G/50ML
50 INJECTION, SOLUTION INTRAVENOUS
Status: DISCONTINUED | OUTPATIENT
Start: 2024-11-05 | End: 2024-11-06

## 2024-11-05 RX ORDER — METOCLOPRAMIDE HYDROCHLORIDE 5 MG/ML
5 INJECTION INTRAMUSCULAR; INTRAVENOUS EVERY 8 HOURS PRN
Status: DISCONTINUED | OUTPATIENT
Start: 2024-11-05 | End: 2024-11-06

## 2024-11-05 NOTE — ED QUICK NOTES
Orders for admission, patient is aware of plan and ready to go upstairs. Any questions, please call ED RN Adore at extension [phone].     Patient Covid vaccination status: Fully vaccinated     COVID Test Ordered in ED: SARS-CoV-2/Flu A and B/RSV by PCR (GeneXpert)    COVID Suspicion at Admission: N/A    Running Infusions:  None    Mental Status/LOC at time of transport: A&O x4    Other pertinent information:   CIWA score: N/A   NIH score:  N/A

## 2024-11-05 NOTE — PAYOR COMM NOTE
--------------  ADMISSION REVIEW     Payor: [employer]  Subscriber #:  [id number]  Authorization Number: [id number]    Admit date: 11/5/24  Admit time:  4:26 AM     11/4 Patient Seen in: [facility] Department    History   Stated Complaint: sob    The 79-year-old male patient presented with shortness of breath. He reported that he had a similar episode last November. He was given 80 mg of an unspecified medication at that time, which he believes he needs again. He also mentioned that he had a pacemaker put in last week and had undergone two procedures within 48 hours. His cardiologist is Alexis Vora. He also mentioned that he had an angiogram and a stent placement. The patient was experiencing difficulty in breathing, which he compared to a previous episode of heart failure. He did not report any fever but mentioned a yellow/green cough. He denied experiencing any chest pain but felt tight and as if he couldn't get enough oxygen. He confirmed that he does not use oxygen at home.    Past Medical History:    Anesthesia complication    was \"out of it\" for a long time after sedation with previous colonoscopies    Back problem    Carotid artery disease (HCC)    Congestive heart disease (HCC)    CORONARY ARTERY DISEASE    Coronary atherosclerosis    DIABETES    Diabetes (HCC)    Gastric ulcer    HEART ATTACK    age 61    High blood pressure    HYPERLIPIDEMIA    Sinus drainage    with weather changes     Past Surgical History:   Procedure Laterality Date    Cardiac defibrillator placement  2007    [employer] dual chamber ICD, not pacemaker dependent    Colonoscopy  4/6/11 - DANNIE Espinoza    adenoma, fair prep, hemorrhoids, repeat 2013-14.    Colonoscopy  4/10/2014    DANNIE Espinoza. 6 adenomas, hemorrhoids, repeat 2017 w/ split 4L prep.     Colonoscopy  07/24/2017    Diverticulosis, Hemorrhoids.  Repeat PRN    Colonoscopy N/A 7/24/2017    Procedure: COLONOSCOPY;  Surgeon: Brad Tapia MD;  Location:  ENDOSCOPY     Open heart surgery (pbp)      Double bypass - done in [facility] [place]    Other      repair AAA    Upper gi endoscopy performed  4/6/11 - DANNIE francis , no H pylori.      Physical Exam     ED Triage Vitals [11/04/24 2315]   /71   Pulse 91   Resp (!) 27   Temp 98.3 °F (36.8 °C)   Temp src Temporal   SpO2 99 %   O2 Device Nasal cannula     Current Vitals:   Vital Signs  BP: 106/55  Pulse: 68  Resp: (!) 33  Temp: 98.3 °F (36.8 °C)  Temp src: Temporal  MAP (mmHg): 71    Oxygen Therapy  SpO2: 100 %  O2 Device: Nasal cannula  O2 Flow Rate (L/min): 4 L/min    Physical Exam    Vital signs reviewed  General appearance: Patient is alert and in mild respiratory distress  HEENT: Pupils equal react to light extraocular muscles intact no scleral icterus, mucous membranes are moist, there is no erythema or exudate in the posterior pharynx  Neck: Supple no JVD no lymphadenopathy no meningismus no carotid bruit  CV: Regular rate and rhythm no murmur rub  Respiratory: Coarse breath sounds bilaterally with crackles in both bases mild accessory muscle use noted   abdomen: Soft nontender nondistended, no rebound no guarding  no hepatosplenomegaly bowel sounds are present , no pulsatile mass  Extremities: No clubbing cyanosis 1+ pitting edema both lower extremities  Neuro: Cranial nerves II through XII intact with no gross focal sensory or motor abnormality.    Labs Reviewed   CBC WITH DIFFERENTIAL WITH PLATELET - Abnormal; Notable for the following components:       Result Value    WBC 19.0 (*)     RBC 3.37 (*)     HGB 10.2 (*)     HCT 31.1 (*)     .0 (*)     Neutrophil Absolute Prelim 15.27 (*)     Neutrophil Absolute 15.27 (*)     Monocyte Absolute 1.05 (*)     All other components within normal limits   COMP METABOLIC PANEL (14) - Abnormal; Notable for the following components:    Glucose 212 (*)     Sodium 135 (*)     BUN 37 (*)     Creatinine 2.05 (*)     eGFR-Cr 32 (*)     All other components within normal limits    TROPONIN I HIGH SENSITIVITY - Abnormal; Notable for the following components:    Troponin I (High Sensitivity) 68 (*)     All other components within normal limits   PRO BETA NATRIURETIC PEPTIDE - Abnormal; Notable for the following components:    Pro-Beta Natriuretic Peptide 30,699 (*)     All other components within normal limits   PROCALCITONIN - Abnormal; Notable for the following components:    Procalcitonin 0.09 (*)     All other components within normal limits   LACTIC ACID, PLASMA - Normal   LIPID PANEL - Normal   SARS-COV-2/FLU A AND B/RSV BY PCR (GENEXPERT) - Normal     EKG 92 Sinus Rhythm  Reading: Unchanged from previous left bundle branch block    Patient was evaluated the emergency department had a CBC chemistry BNP procalcitonin lactic acid COVID flu and RSV.  BNP was significantly elevated at 30,000.  Pro-Alejandro was slightly elevated 0.09 patient does have a white count of 19,000 and troponin was barely elevated.    XR CHEST AP PORTABLE   1. Mild pulmonary vascular congestion and patchy bilateral ground-glass type parahilar opacities, suspicious for mild pulmonary edema/early congestive failure. 2. Minimal blunting of the costophrenic angles bilaterally suspicious for atelectasis versus mild effusions. 3. Stable cardiomegaly.       Patient does appear to be in probable CHF and vascular congestion however there is some patchy bilateral groundglass opacities consistent with failure.  He has had no fever no cough this point we will hold off on antibiotics and see if diuresis works.  Discussed case with cardiology and the hospitalist who agree with plan.    MDM      Differential diagnosis reflecting the complexity of care include: Congestive heart failure, pneumonia, COPD, viral URI    Comorbidities that add complexity to management include: Recent pacemaker placement history of CHF    External chart review was done and was noted: Discharge summary from 10/28/2024 was reviewed patient had a PCI done and  stent placed it was noted to have ischemic cardiomyopathy and chronic systolic heart failure    Discussions of management was done with: Cardiology and the hospitalist were contacted and agree with plan    My independent interpretation of studies of: Chest x-ray shows mild pulmonary vascular congestion and pulmonary edema.  Also some groundglass opacities but no overt pneumonia    Diagnostic tests and medications considered but not ordered were: Considered IV antibiotics but at this point do feel as failure    Disposition and Plan     Clinical Impression:  1. Acute congestive heart failure, unspecified heart failure type (HCC)    2. Hypoxia    3. Anemia, unspecified type    4. Leukocytosis, unspecified type    5. Elevated troponin         11/5:    CARDS:    History of Present Illness:  Mingo White is a a(n) 79 year old male. Nice selena I have known for years.  EF 15%, remote CABG, AICD, long time smoker with COPD.  He had done remarkably well for years, but admitted 10/20/24 with CHF and COPD exacerbation.  Also with several shocks from AICD for VT. M he was diuresed, COPD treated, started on amiodarone.  Attempt at upgrading AICD failed as could not get LV lead in place.  Cath on 10/24 showed 100% RCA and LAD.  Lcx with 90% ostial OM1.  LIMA-LAD and SVG-RCA patent.  He underwent PCI of OM1.  He was discharged on 10/28.  Since dc he has been dyspneic with ADL's and therapy.  He is short of breath just getting up in bed.  No acute change, just frustrated.  In the ER pBNP 30,000, troponin borderline.  Given 40 mg IV lasix and admitted.    Medications:  Scheduled Medications[]Expand by Default    amiodarone  200 mg Oral BID with meals    aspirin  81 mg Oral Nightly    atorvastatin  40 mg Oral Nightly    carvedilol  12.5 mg Oral BID with meals    clopidogrel  75 mg Oral Daily    pantoprazole  40 mg Oral QAM AC    umeclidinium-vilanterol  1 puff Inhalation Daily    enoxaparin  30 mg Subcutaneous Daily    furosemide  80  mg Intravenous BID (Diuretic)           Temp:  [97.6 °F (36.4 °C)-98.3 °F (36.8 °C)] 97.6 °F (36.4 °C)  Pulse:  [68-91] 78  Resp:  [20-34] 20  BP: (106-121)/(55-79) 114/79  SpO2:  [99 %-100 %] 100 %    General: No apparent distress  HEENT: No focal deficits.  Neck: supple. JVP normal  Cardiac: Regular rhythm, S1, S2 normal,   No rub or gallop.  No murmur.  Lungs: CTA, diminished.  Abdomen: Soft, non-tender.   Extremities: No edema  Neurologic: no focal deficits  Skin: Warm and dry.       Telemetry: nsr    EKG, 11/5/2024:  LBBB     CXR, 11/5/2024:  reviewed     Impression:   Dyspnea - COPD, acute on chronic HFpEF, and age/deconditioning.  Severe ischemic DCM, EF 15%  VT with AICD - multiple shocks last admission, now on amiodarone.  COPD with exacerbation.  CRI - stable  Diabetes      Plan:  He needs some diuresis and tuning up of his COPD.  Much of this is deconditioning so needs Ptx and some time.  Same meds as on admit, lasix 80 mg BID.  Daily BMP's.  Ptx to see.      History and Physical      Patient is an 79-year-old male significant past medical history of severe ischemic cardiomyopathy with an EF of 15%, chronic systolic heart failure, history of V. tach status post AICD, CAD status post CABG, type 2 diabetes, AAA status post EVAR, COPD, recently discharged from the hospital last week presents again with dyspnea with minimal exertion.  His last admission was from 10/20/2024 to 10/28/2024, during which time he had ventricular tachycardia with appropriate shocks however unable to upgrade to CRT given anatomy, also underwent angiogram status post PCI to the OM, patient was medically cleared to be discharged only to return a week later with significant dyspnea with exertion, he does not wear any oxygen at home,     In the emergency room his vital signs were stable he was tachypneic at 27, was saturating 99% on 4 L of oxygen, 1+ pitting edema bilaterally, did have a leukocytosis of 19, hemoglobin of 10.2 creatinine  was 2.05 proBNP was elevated at 30,000 699 troponin was slightly elevated at 68, COVID was negative, chest x-ray just showed mild pulmonary vascular congestion and by lateral patchy opacities      Cardiology was consulted and patient was given IV Lasix      OBJECTIVE:  /62 (BP Location: Left arm)   Pulse 61   Temp 98 °F (36.7 °C) (Oral)   Resp 25   Ht 5' 7\" (1.702 m)   Wt 132 lb 11.2 oz (60.2 kg)   SpO2 94%   BMI 20.78 kg/m²     Gen: No acute distress, alert and oriented x 3  Pulm: Lungs clear bilaterally, good inspiratory effort poor respiratory effort,  CV:  nL S1/S2  Abd: soft, NT/ND, no hepatomegaly, +BS  MSK: moving all extremities, 1+ pitting edema  Neuro: no focal deficits  Skin: no rashes/lesions  Psych: normal mood/affect             ASSESSMENT / PLAN:            Patient is an 79-year-old male significant past medical history of severe ischemic cardiomyopathy with an EF of 15%, chronic systolic heart failure, history of V. tach status post AICD, CAD status post CABG, type 2 diabetes, AAA status post EVAR, COPD     # Acute hypoxic respiratory failure likely secondary to  # Acute on chronic systolic heart failure exacerbation  # Elevated troponin  -Dyspnea with exertion, currently weaned off to 4 L of oxygen  -Most recent echo done last admission was on 10/21/2024 that actually shows an EF of 15 to 20%,  -Cardiology has been consulted, elevated proBNP, IV diuresis with Lasix 80 IV twice daily  -Monitor strict I's and O's, daily weights, no evidence of COPD exacerbation at this time, elevated troponin is likely demand ischemia     # History of V. tach status post AICD  -Continues to be in sinus rhythm, continue amiodarone  -Cardiology has been consulted, attempt to upgrade to CRT was unsuccessful last admission because of anatomy  -Continue telemetry monitor     # CAD status post CABG and recent PCI  -Recent PCI last admission in October, continue aspirin, Plavix, statin, beta-blocker     #  COPD  -Continue Incruse does not appear to be COPD exacerbation at this time, DuoNebs as needed        # Type 2 diabetes  -Sliding scale insulin     # History of AAA status post EVAR      Prophy:  DVT: Lovenox  Deconditioning prevention: PT OT     Dispo: admit to [facility] with telemetry     Advanced Care Planning     While discussing goals of care with the patient and their family, patient's wife was there and also voluntarily participated in an advanced care planning discussion.  At this time patient and wife state that they do have enough support at home including home health and home PT, however given recurrent admissions of patients to the hospital discussed the options of palliative care and CODE STATUS, patient and patient's wife agreeable to getting palliative care doctor on board, at this time continue to be full code pending further discussion with the palliative care physician, explained in detail about the recurrent admissions, end-stage heart failure, unable to upgrade CRT all contributing to existing dyspnea.       MEDICATIONS ADMINISTERED IN LAST 1 DAY:  amiodarone (Pacerone) tab 200 mg       Date Action Dose Route User    11/5/2024 1000 Given 200 mg Oral Jake Salcedo RN          carvedilol (Coreg) tab 12.5 mg       Date Action Dose Route User    11/5/2024 1001 Given 12.5 mg Oral Jake Salcedo RN          clopidogrel (Plavix) tab 75 mg       Date Action Dose Route User    11/5/2024 1000 Given 75 mg Oral Jake Salcedo RN          enoxaparin (Lovenox) 30 MG/0.3ML SUBQ injection 30 mg       Date Action Dose Route User    11/5/2024 1003 Given 30 mg Subcutaneous (Left Lower Abdomen) Jake Salcedo RN          furosemide (Lasix) 10 mg/mL injection 40 mg       Date Action Dose Route User    11/5/2024 0120 Given 40 mg Intravenous Adore Brown RN          furosemide (Lasix) 10 mg/mL injection 80 mg       Date Action Dose Route User    11/5/2024 0959 Given 80 mg Intravenous Denisse  Jake ROBERTO RN          pantoprazole (Protonix) DR tab 40 mg       Date Action Dose Route User    11/5/2024 0900 Given 40 mg Oral Jake Salcedo RN          umeclidinium-vilanterol (Anoro Ellipta) 62.5-25 MCG/ACT inhaler 1 puff       Date Action Dose Route User    11/5/2024 0924 Given 1 puff Inhalation Isidra Mendez, MARLY            Vitals (last day)       Date/Time Temp Pulse Resp BP SpO2 Weight O2 Device O2 Flow Rate (L/min) [facility]    11/05/24 1200 98 °F (36.7 °C) 61 25 101/62 94 % -- None (Room air) --     11/05/24 1030 -- 69 18 -- 95 % -- -- --     11/05/24 0927 -- -- -- -- 93 % -- None (Room air) --     11/05/24 0845 97.3 °F (36.3 °C) 75 26 106/51 95 % 132 lb 11.2 oz (60.2 kg) None (Room air) 0 L/min     11/05/24 0800 -- -- -- -- 100 % -- Nasal cannula 2 L/min     11/05/24 0700 -- -- -- -- -- -- Nasal cannula 5 L/min     11/05/24 0430 97.6 °F (36.4 °C) 78 20 114/79 100 % 132 lb 9.6 oz (60.1 kg) Nasal cannula 5 L/min     11/05/24 0349 -- 68 33 106/55 100 % -- Nasal cannula 4 L/min SC    11/05/24 0151 -- 74 32 110/61 100 % -- Nasal cannula 4 L/min SC    11/05/24 0052 -- -- -- -- -- -- Nasal cannula 4 L/min SC    11/04/24 2358 -- 89 34 121/70 100 % -- -- 4 L/min SC    11/04/24 2315 98.3 °F (36.8 °C) 91 27 120/71 99 % 130 lb (59 kg) Nasal cannula 2 L/min SC

## 2024-11-05 NOTE — PHYSICAL THERAPY NOTE
PHYSICAL THERAPY EVALUATION - INPATIENT     Room Number: 0018/0018-A  Evaluation Date: 11/5/2024  Type of Evaluation: Initial  Physician Order: PT Eval and Treat    Presenting Problem: incr SOB  Co-Morbidities : ischemic cardiomyopathy with an EF of 25%, history of VT status post AICD, chronic systolic heart failure, AAA status post EVAR, diabetes mellitus type 2, dyslipidemia, left renal artery stenosis  Reason for Therapy: Mobility Dysfunction and Discharge Planning    Recent Admissions:  10/20-10/28/24: ventricular tachycardia, s/p pacemaker and stent >>> [facility]    PHYSICAL THERAPY ASSESSMENT   Patient is a 79 year old male admitted 11/4/2024 for incr SOB.  Prior to admission, patient's baseline is [place] with RW to indep.  Patient is currently functioning near baseline with bed mobility, transfers, gait, maintaining seated position, and standing prolonged periods.  Patient is requiring supervision as a result of the following impairments: decreased functional strength, decreased endurance/aerobic capacity, impaired dynamic standing balance, impaired coordination, and decreased muscular endurance.  Physical Therapy will continue to follow for duration of hospitalization.    Patient will benefit from continued skilled PT Services at discharge to promote prior level of function and safety with additional support and return home with home health PT.    PLAN DURING HOSPITALIZATION  Nursing Mobility Recommendation : 1 Assist  PT Device Recommendation: Rolling walker  PT Treatment Plan: Bed mobility;Body mechanics;Coordination;Endurance;Energy conservation;Patient education;Family education;Gait training;Neuromuscular re-educate;Strengthening;Range of motion;Stoop training;Stair training;Transfer training;Balance training  Rehab Potential : Good  Frequency (Obs): 3-5x/week     CURRENT GOALS  Goal #1 Patient is able to demonstrate supine - sit EOB @ level: independent   Goal #2 Patient is able to demonstrate transfers Sit  to/from Stand at assistance level: independent   Goal #3 Patient is able to ambulate 150 feet with assist device: walker - rolling at assistance level: supervision   Goal #4    Goal #5    Goal #6    Goal Comments: Goals established on 2024    PHYSICAL THERAPY MEDICAL/SOCIAL HISTORY  History related to current admission: Patient is a 79 year old male admitted on 2024 from home for incr SOB.  Pt diagnosed with CHF exacerbation.    HOME SITUATION  Type of Home: House ([facility])  Home Layout: One level  Stairs to Enter : 1   Railing: No              Lives With: Spouse    Drives: Yes   Patient Regularly Uses: Glasses;Rolling walker      Prior Level of [place]:   Per pt lives in one story home with spouse. Typically indep at baseline. Since recent discharge, has been ambulating with RW. No hx of falls.    SUBJECTIVE  \"I feel lousy!\"    OBJECTIVE  Precautions: Bed/chair alarm  Fall Risk: Standard fall risk    WEIGHT BEARING RESTRICTION     PAIN ASSESSMENT  Ratin  Location: denies pain       COGNITION  Overall Cognitive Status:  WFL - within functional limits    RANGE OF MOTION AND STRENGTH ASSESSMENT  Upper extremity ROM and strength are within functional limits   Lower extremity ROM is within functional limits   Lower extremity strength is within functional limits     BALANCE  Static Sitting: Good  Dynamic Sitting: Fair +  Static Standing: Fair  Dynamic Standing: Fair -    ADDITIONAL TESTS                                    ACTIVITY TOLERANCE                         O2 WALK       NEUROLOGICAL FINDINGS                        AM-PAC '6-Clicks' INPATIENT SHORT FORM - BASIC MOBILITY  How much difficulty does the patient currently have...  Patient Difficulty: Turning over in bed (including adjusting bedclothes, sheets and blankets)?: A Little   Patient Difficulty: Sitting down on and standing up from a chair with arms (e.g., wheelchair, bedside commode, etc.): A Little   Patient Difficulty: Moving from lying  on back to sitting on the side of the bed?: A Little   How much help from another person does the patient currently need...   Help from Another: Moving to and from a bed to a chair (including a wheelchair)?: A Little   Help from Another: Need to walk in hospital room?: A Little   Help from Another: Climbing 3-5 steps with a railing?: A Little     AM-PAC Score:  Raw Score: 18   Approx Degree of Impairment: 46.58%   Standardized Score (AM-PAC Scale): 43.63   CMS Modifier (G-Code): CK    FUNCTIONAL ABILITY STATUS  Gait Assessment   Functional Mobility/Gait Assessment  Gait Assistance: Supervision  Distance (ft): 50  Assistive Device: Rolling walker  Pattern: Within Functional Limits    Skilled Therapy Provided   Educated on importance of continued out of bed mobility and ambulation with use of RW and assist from supervision    Bed mobility> sit to stand to RW> ambulated 50 feet with RW> returned supine in bed    Bed Mobility:  Rolling: NT  Supine to sit: supervision   Sit to supine: supervision     Transfer Mobility:  Sit to stand: supervision to RW   Stand to sit: supervision  Gait = supervision with RW x 50 feet     Therapist's Comments:   Patient presents sitting up in bed. Discussed role and goal of physical therapy in hospital setting. Pt in agreement to session.    Exercise/Education Provided:  Bed mobility  Body mechanics  Energy conservation  Functional activity tolerated  Gait training  Posture  Transfer training    Patient End of Session: Up in chair;Needs met;Call light within reach;RN aware of session/findings;All patient questions and concerns addressed;Hospital anti-slip socks;Alarm set;SCDs in place;Discussed recommendations with /    Patient Evaluation Complexity Level:  History Moderate - 1 or 2 personal factors and/or co-morbidities   Examination of body systems Low -  addressing 1-2 elements   Clinical Presentation Low- Stable   Clinical Decision Making Low Complexity     PT  Session Time: 20 minutes  Therapeutic Activity: 10 minutes

## 2024-11-05 NOTE — PLAN OF CARE
NURSING ADMISSION NOTE      Patient admitted via Cart  Oriented to room.  Safety precautions initiated.  Bed in low position.  Call light in reach.    Received patient awake and alert x 4.   O2 protocol:  diminished lungs, sats 100% on 3LNC from emergency room. . shortness of breath with activity.  Normal sinus rhythm on telemetry monitoring. Denies chest pain.   Cardiac electrolyte protocol  SCD  for VTE prophylaxis.  Had bowel movement 11/4  Voiding adequately, clear yellow urine.  Fall precaution, bed alarm on, call light and bedside table within reach.    Up with 1 assist and a walker and gait belt.     QID accuchecks    Problem: Diabetes/Glucose Control  Goal: Glucose maintained within prescribed range  Description: INTERVENTIONS:  - Monitor Blood Glucose as ordered  - Assess for signs and symptoms of hyperglycemia and hypoglycemia  - Administer ordered medications to maintain glucose within target range  - Assess barriers to adequate nutritional intake and initiate nutrition consult as needed  - Instruct patient on self management of diabetes  Outcome: Progressing     Problem: Patient/Family Goals  Goal: Patient/Family Long Term Goal  Description: Patient's Long Term Goal: discharge when medically stable    Interventions:  Telemetry monitoring  O2 protocol,   Fall precaution, bed alarm on, non-skid socks on,  call light and bedside table within reach.  Monitor intake and output.  Electrolyte protocol.  Heparin or Lovenox and SCD for VTE   Daily weight  Strict intake and output.             - See additional Care Plan goals for specific interventions  Outcome: Progressing  Goal: Patient/Family Short Term Goal  Description: Patient's Short Term Goal: will have no shortness of breath this shift    Interventions:   Telemetry monitoring  O2 protocol,   Fall precaution, bed alarm on, non-skid socks on,  call light and bedside table within reach.  Monitor intake and output.  Electrolyte protocol.  Heparin or  Lovenox and SCD for VTE   Daily weight  Strict intake and output.     - See additional Care Plan goals for specific interventions  Outcome: Progressing     Problem: CARDIOVASCULAR - ADULT  Goal: Absence of cardiac arrhythmias or at baseline  Description: INTERVENTIONS:  - Continuous cardiac monitoring, monitor vital signs, obtain 12 lead EKG if indicated  - Evaluate effectiveness of antiarrhythmic and heart rate control medications as ordered  - Initiate emergency measures for life threatening arrhythmias  - Monitor electrolytes and administer replacement therapy as ordered  Outcome: Progressing

## 2024-11-05 NOTE — ED INITIAL ASSESSMENT (HPI)
Pt was d/verito on Monday after pacemaker placement. Pt BIBEMS from home after feeling short of breath \"for a couple hours.\" Denies CP at this time. No O2 use at home, now on 2L per NC.

## 2024-11-05 NOTE — DISCHARGE INSTRUCTIONS
[facility]: [phone]    Going Home Instructions  In this section you will find the tools which will guide you through the first few days after you leave the hospital. Continued use of these tools will help you develop the skills necessary to keep your heart failure under control.     Home Care Instructions Following Heart Failure - the most important things to do every day include:   Weigh yourself and review the “Self-Check Plan” sheet every morning.   Call your cardiologist office if you are in the “Pay Attention-Use Caution” (yellow zone) or “Medical Alert-Warning!” (red zone) as outlined in the Self-Check Plan sheet.  Take your medicines as prescribed.  Limit your sodium (salt) intake.  Know when to call your cardiologist, primary doctor, or nurse.  Know when to seek emergency care.      Things for You to Remember:   1. See your doctor or healthcare provider as written on your discharge instructions.  It is important that you attend this appointment to make sure your symptoms are under control.     2. Your recommended sodium intake is 9503-3152 mg daily.    3.  Weigh yourself every day.    4. Some exercise and activity is important to help keep your heart functioning and strong. Unless instructed not to exercise, you may walk at a slow to moderate pace for 10-15 minutes 2-3 days per week to start. Pace your activity to prevent shortness of breath or fatigue. Stop exercising if you develop chest pain, lightheadedness, or significant shortness of breath.       Call Your Cardiologist If:   You gain 2-3 pounds in one day or 5 pounds in one week.  You have more difficulty breathing.  You are getting more tired with normal activity.  You are more short of breath lying down, or awaken at night short of breath.  You have swelling of your feet or legs.  You urinate less often during the day and more often at night.  You have cramps in your legs.  You have blurred vision or see yellowish-green halos  around objects of lights.    Go to the Emergency Room If:   You have pain or tightness in your chest  You are extremely short of breath  You are coughing up pink-frothy mucus  You are traveling and develop symptoms of worsening heart failure      ** Please follow up with your cardiologist or Advanced Practice Provider as written on your discharge instructions. If you are not provided with an appointment, let your nurse know so you can get an appointment**      Post Discharge Instructions:    Medications:  -Stop taking furosemide.  Start taking torsemide 40 mg daily.  -Continue lisinopril 5 mg daily.    Follow-ups:  -Follow-up with your PCP in 1 week  -Follow-up with cardiology in 1 week.

## 2024-11-05 NOTE — CM/SW NOTE
Per chart review, pt recently admitted 10/-19-10/28. He was arranged with [facility] and followed up with his PCP on 10/31 and agreed to have meds be filled via [facility].    Pt has a history of COPD/CHF with EF of 15%. He does have an AICD. Message sent to hospitalist to inquire about Palliative Care consult for added support.     PLOF: Home with spouse, normally independent with mobility/ADLs. With PT on 10/28, he ambulated 100, 75, 50 feet CGA/min A but was fatigued.     Therapy eval pending this admission.     Addendum: Anticipated therapy need: Home with Home Healthcare    Pt also seen by CHF  and Palliative Care consult is pending.    [facility] in [place]. Will need to update on day of discharge and send AVS.    CM/SW to remain available.    CAROLA AlcalaN, RN-BC

## 2024-11-05 NOTE — PLAN OF CARE
Assumed patient care this morning.   Alert, awake. Breathing unlabored at rest, shortness of breath with minimal exertion. Weaned to RA. Poor appetite, per patient started with amiodarone. Ambulating in hallway.

## 2024-11-05 NOTE — CONSULTS
Cardiology Consultation    Mingo White Patient Status:  Inpatient    1945 MRN [id number]   Location [facility] 0SW-A Attending Henrry Lantigua MD   Hosp Day # 0 PCP Darne Espino MD     Reason for Consultation:  dyspnea      History of Present Illness:  Mingo White is a a(n) 79 year old male. Nice selena I have known for years.  EF 15%, remote CABG, AICD, long time smoker with COPD.  He had done remarkably well for years, but admitted 10/20/24 with CHF and COPD exacerbation.  Also with several shocks from AICD for VT. M he was diuresed, COPD treated, started on amiodarone.  Attempt at upgrading AICD failed as could not get LV lead in place.  Cath on 10/24 showed 100% RCA and LAD.  Lcx with 90% ostial OM1.  LIMA-LAD and SVG-RCA patent.  He underwent PCI of OM1.  He was discharged on 10/28.  Since dc he has been dyspneic with ADL's and therapy.  He is short of breath just getting up in bed.  No acute change, just frustrated.  In the ER pBNP 30,000, troponin borderline.  Given 40 mg IV lasix and admitted.    History:  Past Medical History:    Anesthesia complication    was \"out of it\" for a long time after sedation with previous colonoscopies    Back problem    Carotid artery disease (HCC)    Congestive heart disease (HCC)    CORONARY ARTERY DISEASE    Coronary atherosclerosis    DIABETES    Diabetes (HCC)    Gastric ulcer    HEART ATTACK    age 61    High blood pressure    HYPERLIPIDEMIA    Sinus drainage    with weather changes     Past Surgical History:   Procedure Laterality Date    Cardiac defibrillator placement      Medtronic dual chamber ICD, not pacemaker dependent    Colonoscopy  11 - DANNIE Espinoza    adenoma, fair prep, hemorrhoids, repeat -.    Colonoscopy  4/10/2014    DANNIE Espinoza. 6 adenomas, hemorrhoids, repeat  w/ split 4L prep.     Colonoscopy  2017    Diverticulosis, Hemorrhoids.  Repeat PRN    Colonoscopy N/A 2017    Procedure: COLONOSCOPY;  Surgeon: Brad Tapia  MD [place];  Location:  ENDOSCOPY    Open heart surgery (pbp)      Double bypass - done in [facility]    Other      repair AAA    Upper gi endoscopy performed  4/6/11 - DANNIE francis , no H pylori.      Family History   Problem Relation Age of Onset    Heart Disorder Father     Heart Disorder Mother     Diabetes Maternal Grandmother          Allergies:  Allergies[1]    Medications:   amiodarone  200 mg Oral BID with meals    aspirin  81 mg Oral Nightly    atorvastatin  40 mg Oral Nightly    carvedilol  12.5 mg Oral BID with meals    clopidogrel  75 mg Oral Daily    pantoprazole  40 mg Oral QAM AC    umeclidinium-vilanterol  1 puff Inhalation Daily    enoxaparin  30 mg Subcutaneous Daily    furosemide  80 mg Intravenous BID (Diuretic)       Continuous Infusions:      Social History:   reports that he has been smoking cigarettes. He has a 25 pack-year smoking history. He has never used smokeless tobacco. He reports that he does not drink alcohol and does not use drugs.    Review of Systems:  All systems were reviewed and are negative except as described above in HPI.    Physical Exam:      Temp:  [97.6 °F (36.4 °C)-98.3 °F (36.8 °C)] 97.6 °F (36.4 °C)  Pulse:  [68-91] 78  Resp:  [20-34] 20  BP: (106-121)/(55-79) 114/79  SpO2:  [99 %-100 %] 100 %    Last 3 Weights   11/05/24 0430 132 lb 9.6 oz (60.1 kg)   11/04/24 2315 130 lb (59 kg)   10/28/24 0454 134 lb 11.2 oz (61.1 kg)   10/27/24 0140 135 lb 12.9 oz (61.6 kg)   10/23/24 0500 136 lb 3.9 oz (61.8 kg)   10/20/24 1208 106 lb 11.2 oz (48.4 kg)   10/20/24 0130 106 lb 11.2 oz (48.4 kg)   10/19/24 2239 112 lb 7 oz (51 kg)   11/15/23 0532 113 lb 1.6 oz (51.3 kg)   11/14/23 0426 115 lb 4.8 oz (52.3 kg)   11/13/23 2346 122 lb (55.3 kg)           General: No apparent distress  HEENT: No focal deficits.  Neck: supple. JVP normal  Cardiac: Regular rhythm, S1, S2 normal,   No rub or gallop.  No murmur.  Lungs: CTA, diminished.  Abdomen: Soft, non-tender.   Extremities: No  edema  Neurologic: no focal deficits  Skin: Warm and dry.          Telemetry: nsr    Laboratories and Data:  Diagnostics:    EKG, 11/5/2024:  LBBB    CXR, 11/5/2024:  reviewed    Labs:   HEM:  Recent Labs   Lab 11/04/24 2319   WBC 19.0*   HGB 10.2*   .0*       Chem:  Recent Labs   Lab 11/04/24 2319   *   K 4.0      CO2 24.0   BUN 37*   CREATSERUM 2.05*   CA 8.7   *       Recent Labs   Lab 11/04/24 2319   ALT 19   AST 21   ALB 3.4       No results for input(s): \"PTP\", \"INR\" in the last 168 hours.    No results for input(s): \"TROP\", \"CK\" in the last 168 hours.      Impression:   Dyspnea - COPD, acute on chronic HFpEF, and age/deconditioning.  Severe ischemic DCM, EF 15%  VT with AICD - multiple shocks last admission, now on amiodarone.  COPD with exacerbation.  CRI - stable  Diabetes     Plan:  He needs some diuresis and tuning up of his COPD.  Much of this is deconditioning so needs Ptx and some time.  Same meds as on admit, lasix 80 mg BID.  Daily BMP's.  Ptx to see.    Alexandru Rogers MD  11/5/2024  6:55 AM  C5       [1]   Allergies  Allergen Reactions    Niaspan [Niacin, Antihyperlipidemic] ITCHING and FACE FLUSHING    Heparin UNKNOWN     States possible allergy to heparin. Blood sugar elevated when he was given heparin
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
## 2024-11-05 NOTE — H&P
[facility] Hospitalist H&P       CC:   Chief Complaint   Patient presents with    Difficulty Breathing        PCP: Daren Espino MD    History of Present Illness:    Patient is an 79-year-old male significant past medical history of severe ischemic cardiomyopathy with an EF of 15%, chronic systolic heart failure, history of V. tach status post AICD, CAD status post CABG, type 2 diabetes, AAA status post EVAR, COPD, recently discharged from the hospital last week presents again with dyspnea with minimal exertion.  His last admission was from 10/20/2024 to 10/28/2024, during which time he had ventricular tachycardia with appropriate shocks however unable to upgrade to CRT given anatomy, also underwent angiogram status post PCI to the OM, patient was medically cleared to be discharged only to return a week later with significant dyspnea with exertion, he does not wear any oxygen at home,    In the emergency room his vital signs were stable he was tachypneic at 27, was saturating 99% on 4 L of oxygen, 1+ pitting edema bilaterally, did have a leukocytosis of 19, hemoglobin of 10.2 creatinine was 2.05 proBNP was elevated at 30,000 699 troponin was slightly elevated at 68, COVID was negative, chest x-ray just showed mild pulmonary vascular congestion and by lateral patchy opacities      Cardiology was consulted and patient was given IV Lasix  PMH  Past Medical History:    Anesthesia complication    was \"out of it\" for a long time after sedation with previous colonoscopies    Back problem    Carotid artery disease (HCC)    Congestive heart disease (HCC)    CORONARY ARTERY DISEASE    Coronary atherosclerosis    DIABETES    Diabetes (HCC)    Gastric ulcer    HEART ATTACK    age 61    High blood pressure    HYPERLIPIDEMIA    Sinus drainage    with weather changes        PS  Past Surgical History:   Procedure Laterality Date    Cardiac defibrillator placement  2007    [employer]onic dual chamber ICD, not pacemaker dependent     Colonoscopy  4/6/11 - DANNIE Espinoza    adenoma, fair prep, hemorrhoids, repeat 2013-14.    Colonoscopy  4/10/2014    DANNIE Espinoza. 6 adenomas, hemorrhoids, repeat 2017 w/ split 4L prep.     Colonoscopy  07/24/2017    Diverticulosis, Hemorrhoids.  Repeat PRN    Colonoscopy N/A 7/24/2017    Procedure: COLONOSCOPY;  Surgeon: Brad Tapia MD;  Location:  ENDOSCOPY    Open heart surgery (pbp)      Double bypass - done in [facility]    Other      repair AAA    Upper gi endoscopy performed  4/6/11 - DANNIE Espinoza    small , no H pylori.         ALL:  Allergies[1]     Home Medications:  Medications Taking[2]      Soc Hx  Social History     Tobacco Use    Smoking status: Every Day     Current packs/day: 0.50     Average packs/day: 0.5 packs/day for 50.0 years (25.0 ttl pk-yrs)     Types: Cigarettes    Smokeless tobacco: Never   Substance Use Topics    Alcohol use: No     Alcohol/week: 0.0 standard drinks of alcohol     Comment: quit 2006        Fam Hx  Family History   Problem Relation Age of Onset    Heart Disorder Father     Heart Disorder Mother     Diabetes Maternal Grandmother        Review of Systems  General: Denies unintentional weight loss, fevers, or chills negative except above  HEENT: Denies vision loss or double vision, denies hearing loss  Cardiovascular: Denies chest pain, palpitations, peripheral edema  Pulmonary: Denies cough, shortness of breath, or wheezing  Gastrointestinal: Denies abdominal pain, melena, or hematochezia  Genitourinary: Denies urinary frequency, urgency, and dysuria  Neurologic: Denies numbness, headaches, focal weakness  Skin: Denies rashes, sores  Endocrine: Denies heat or cold intolerance, denies polydipsia  Hematologic: Denies abnormal bleeding or bruising     OBJECTIVE:  /62 (BP Location: Left arm)   Pulse 61   Temp 98 °F (36.7 °C) (Oral)   Resp 25   Ht 5' 7\" (1.702 m)   Wt 132 lb 11.2 oz (60.2 kg)   SpO2 94%   BMI 20.78 kg/m²     BP Readings from Last 3 Encounters:   11/05/24 101/62    10/28/24 99/50   11/15/23 123/53     Wt Readings from Last 3 Encounters:   11/05/24 132 lb 11.2 oz (60.2 kg)   10/28/24 134 lb 11.2 oz (61.1 kg)   11/15/23 113 lb 1.6 oz (51.3 kg)       Wt Readings from Last 6 Encounters:   11/05/24 132 lb 11.2 oz (60.2 kg)   10/28/24 134 lb 11.2 oz (61.1 kg)   11/15/23 113 lb 1.6 oz (51.3 kg)   03/21/22 124 lb (56.2 kg)   11/22/21 124 lb (56.2 kg)   09/01/21 127 lb (57.6 kg)     Gen: No acute distress, alert and oriented x 3  Pulm: Lungs clear bilaterally, good inspiratory effort poor respiratory effort,  CV:  nL S1/S2  Abd: soft, NT/ND, no hepatomegaly, +BS  MSK: moving all extremities, 1+ pitting edema  Neuro: no focal deficits  Skin: no rashes/lesions  Psych: normal mood/affect          Diagnostic Data:    CBC/Chem  Recent Labs   Lab 11/04/24 2319   WBC 19.0*   HGB 10.2*   MCV 92.3   .0*       Recent Labs   Lab 11/04/24  2319   *   K 4.0      CO2 24.0   BUN 37*   CREATSERUM 2.05*   *   CA 8.7       Recent Labs   Lab 11/04/24  2319   ALT 19   AST 21   ALB 3.4       No results for input(s): \"TROP\" in the last 168 hours.        Radiology: XR CHEST AP PORTABLE  (CPT=71045)    Result Date: 11/4/2024  PROCEDURE:  XR CHEST AP PORTABLE  (CPT=71045)  TECHNIQUE:  AP chest radiograph was obtained.  COMPARISON:  [facility] , XR, XR CHEST AP PORTABLE  (CPT=71045), 10/25/2024, 11:10 PM.  INDICATIONS:  Patient presents with shortness of breath for the past few hours.  PATIENT STATED HISTORY: (As transcribed by Technologist)  Patient has been SOB for a few hours    FINDINGS:  Stable cardiomegaly and dual lead cardiac pacemaker.  Stable changes of prior CABG.  There is mild pulmonary vascular congestion and mild patchy perihilar ground-glass type opacities suspicious for mild, early pulmonary edema.  Minimal bibasilar effusions with blunting of the costophrenic angle suggested.  No significant osseous findings.             CONCLUSION:  1. Mild pulmonary vascular  congestion and patchy bilateral ground-glass type parahilar opacities, suspicious for mild pulmonary edema/early congestive failure. 2. Minimal blunting of the costophrenic angles bilaterally suspicious for atelectasis versus mild effusions. 3. Stable cardiomegaly.   LOCATION:  [facility]      Dictated by (CST): CabreraDasha DO on 11/04/2024 at 11:53 PM     Finalized by (CST): Dasha Rees DO on 11/04/2024 at 11:54 PM       US VENOUS DOPPLER ARM LEFT - DIAG IMG (CPT=93971)    Result Date: 10/28/2024  PROCEDURE:  US VENOUS DOPPLER ARM LEFT - DIAG IMG (CPT=93971)  COMPARISON:  None.  INDICATIONS:  Unilateral edema  TECHNIQUE:  Real time, grey scale, and duplex ultrasound was used to evaluate the upper extremity venous system. B-mode two-dimensional images of the vascular structures, Doppler spectral analysis, and color flow.  Doppler imaging were performed.  The following veins were imaged:  Subclavian, Jugular, Axillary, Brachial, Basilic, Cephalic, and the contralateral Subclavian and Jugular.     FINDINGS:  EXTREMITY:  Left upper extremity THROMBI:  There is occlusive thrombus identified throughout the cephalic vein in the upper arm.  The cephalic vein in the forearm is patent.  No DVT is identified.  There is also occlusive thrombus seen within the median antecubital vein in the left antecubital fossa. COMPRESSION:  Normal compressibility, phasicity, and augmentation of the subclavian, jugular, axillary, brachial and basilic veins. OTHER:  Negative.            CONCLUSION:  1. Findings are consistent with superficial thrombophlebitis involving the cephalic vein in the proximal, mid and distal left upper arm.  The left cephalic vein in the forearm is patent. 2. There is occlusive superficial thrombophlebitis involving the median antecubital vein. 3. No DVT is identified.    LOCATION:  [facility]   Dictated by (CST): Jayy Baker MD on 10/28/2024 at 2:36 PM     Finalized by (CST): Jayy Baker MD on 10/28/2024  at 2:39 PM       XR CHEST AP PORTABLE  (CPT=71045)    Result Date: 10/26/2024  PROCEDURE:  XR CHEST AP PORTABLE  (CPT=71045)  TECHNIQUE:  AP chest radiograph was obtained.  COMPARISON:  [facility] , XR, XR CHEST AP PORTABLE  (CPT=71045), 10/21/2024, 8:30 AM.  INDICATIONS:  s/p ICD  PATIENT STATED HISTORY: (As transcribed by Technologist)  Patient offered no additional history at this time.              CONCLUSION:    Improvement.  Continued but decrease in mixed interstitial and airspace opacities in the lung, now primarily at the right and left lung base.  No new or worsening process.  Continued hyperinflation.  Stable cardiac enlargement.  Stable pacemaker.  No sign of pneumothorax or CHF.  LOCATION:  [facility]      Dictated by (CST): Richardson Carty MD on 10/25/2024 at 11:59 PM     Finalized by (CST): Richardson Carty MD on 10/26/2024 at 0:00 AM       CATH EP    Result Date: 10/25/2024  This exam has been completed. Please refer to Notes for the results to this procedure.    CATH ANGIO    Result Date: 10/24/2024  This exam has been completed. Please refer to Notes for the results to this procedure.    CARD ECHO 2D DOPPLER CONTRAST (CPT=93306)    Result Date: 10/21/2024  Transthoracic Echocardiogram Name:Mingo White ROCKY Date: 10/21/2024 :  1945 Ht:  (66in)  BP: 135 / 64 MRN:  [id number]    Age:  79years    Wt:  (106lb) HR: 64bpm Loc:  [facility]       Gndr: M          BSA: 1.53m^2 Sonographer: Dinah ENCARNACION Ordering:    Renae Raymundo Consulting:  Alexandru Rogers ---------------------------------------------------------------------------- History/Indications:  Implantable Cardioverter-Defibrillator.  Coronary artery disease.  Re-assess left ventricular ejection fraction.  PMH: Myocardial infarction.  Risk factors:  Diabetes mellitus. ---------------------------------------------------------------------------- Procedure information:  A transthoracic echocardiogram, limited study was performed. Additional evaluation  included M-mode, limited 2D, and limited spectral Doppler.  Patient status:  Inpatient.  Location:  [facility] Room 6615. Comparison was made to the study of 10/20/2024.    This was a routine study. Transthoracic echocardiography for ventricular function evaluation. Image quality was suboptimal. The study was technically limited due to poor acoustic window availability and body habitus. Intravenous contrast (Definity) was administered to evaluate left ventricular function, enhance regional wall motion assessment, and opacify the LV. ECG rhythm:   Normal sinus ---------------------------------------------------------------------------- Conclusions: 1. Left ventricle: The cavity size was mildly to moderately increased.    Systolic function was markedly reduced. The estimated ejection fraction    was 15-20%, by visual assessment. There was severe diffuse hypokinesis    with regional variations. There was no evidence of a thrombus revealed by    acoustic contrast opacification. 2. Right ventricle: Pacer C/W ICD noted in the right ventricle. 3. Left atrium: The left atrial volume was mildly increased. 4. Right atrium: Pacer C/W ICD noted in right atrium. 5. Mitral valve: There was mild to moderate regurgitation. 6. Pulmonary arteries: Systolic pressure was mildly increased, in the range    of 35mm Hg to 40mm Hg. Impressions:  This study is compared with previous dated 10/20/2024: No significant change in ejection fraction. * ---------------------------------------------------------------------------- * Findings: Left ventricle:  The cavity size was mildly to moderately increased. Systolic function was markedly reduced. The estimated ejection fraction was 15-20%, by visual assessment. There was severe diffuse hypokinesis with regional variations.  There was no evidence of a thrombus revealed by acoustic contrast opacification. Left atrium:  The left atrial volume was mildly increased. Right ventricle:  The cavity size was  normal. Pacer C/W ICD noted in the right ventricle. Systolic function was normal. Right atrium:  The atrium was normal in size. Pacer C/W ICD noted in right atrium. Mitral valve:  The valve was structurally normal. Leaflet separation was normal.  Doppler:  Transvalvular velocity was within the normal range. There was no evidence for stenosis. There was mild to moderate regurgitation. Aortic valve:  The valve was structurally normal. The valve was trileaflet. Cusp separation was normal.  Doppler:  Transvalvular velocity was not obtained.  Doppler assessment for regurgitation was not performed. Tricuspid valve:  The valve is structurally normal. Leaflet separation was normal.  Doppler:  Transvalvular velocity was within the normal range. There was no evidence for stenosis. There was trivial regurgitation. Pulmonic valve:    Doppler:  Transvalvular velocity was not obtained. Doppler assessment for regurgitation was not performed. Pericardium:   There was no pericardial effusion. Aorta: Aortic root: The aortic root was normal. Pulmonary arteries: The main pulmonary artery was normal-sized. Systolic pressure was mildly increased, in the range of 35mm Hg to 40mm Hg. Systemic veins:  Central venous respirophasic diameter changes are in the normal range (>50%). Inferior vena cava: The IVC was normal-sized. ---------------------------------------------------------------------------- Measurements  Left ventricle         Value        Ref       10/20/2024  IVS thickness, ED, (L) 0.5   cm     0.6 - 1.0 0.8  PLAX  LV ID, ED, PLAX    (H) 6.4   cm     4.2 - 5.8 6.6  LV ID, ES, PLAX    (H) 5.8   cm     2.5 - 4.0 6.1  LV PW thickness,       0.7   cm     0.6 - 1.0 0.7  ED, PLAX  IVS/LV PW ratio,       0.76         --------- 1.06  ED, PLAX  LV PW/LV ID ratio,     0.11         --------- 0.11  ED, PLAX  LV ejection        (L) 19    %      52 - 72   18  fraction  Aortic root            Value        Ref       10/20/2024  Aortic root ID, ED      3.0   cm     2.3 - 3.8 3.1  Left atrium            Value        Ref       10/20/2024  LA ID, A-P, ES     (H) 4.3   cm     3.0 - 4.0 ----------  LA volume, S       (H) 74    ml     18 - 58   53  LA volume/bsa, S   (H) 48    ml/m^2 16 - 34   35  LA volume, ES, 1-p (H) 72    ml     18 - 58   57  A4C  LA volume, ES, 1-p (H) 67    ml     18 - 58   48  A2C  LA volume, ES, A/L     76    ml     --------- 55  LA volume/bsa, ES, (H) 50    ml/m^2 16 - 34   36  A/L  LA/aortic root         1.43         --------- ----------  ratio  Pulmonary artery       Value        Ref       10/20/2024  PA pressure, S, DP     37    mm Hg  --------- ----------  Tricuspid valve        Value        Ref       10/20/2024  Tricuspid regurg   (H) 2.93  m/sec  <=2.8     ----------  peak velocity  Tricuspid peak         34    mm Hg  --------- ----------  RV-RA gradient  Systemic veins         Value        Ref       10/20/2024  Estimated CVP          3     mm Hg  --------- 3  Right ventricle        Value        Ref       10/20/2024  TAPSE, 2D              1.74  cm     >=1.70    1.31  RV pressure, S, DP     37    mm Hg  --------- ---------- Legend: (L)  and  (H)  jessie values outside specified reference range. ---------------------------------------------------------------------------- Prepared and electronically signed by Israel Zuniga 10/21/2024 11:56     XR CHEST AP PORTABLE  (CPT=71045)    Result Date: 10/21/2024  PROCEDURE:  XR CHEST AP PORTABLE  (CPT=71045)  TECHNIQUE:  AP chest radiograph was obtained.  COMPARISON:  [facility] , XR, XR CHEST AP PORTABLE  (CPT=71045), 10/20/2024, 9:46 AM.  INDICATIONS:  hypoxic  PATIENT STATED HISTORY: (As transcribed by Technologist)  Patient offered no additional history at this time.    FINDINGS:  Support devices appear overall stable.  Postsurgical changes of chest are noted.  Heart size is within normal limits.  There is a question of slight increased prominence of the pulmonary vasculature.  This may  reflect mild vascular congestion and volume overload.  Mild basilar atelectasis is favored.  No new focal consolidation or pleural effusion is seen.            CONCLUSION:  See above.   LOCATION:  [facility]      Dictated by (CST): Andrea Lewis MD on 10/21/2024 at 8:49 AM     Finalized by (CST): Andrea Lewis MD on 10/21/2024 at 8:50 AM       CARD ECHO 2D DOPPLER CONTRAST (CPT=93306)    Result Date: 10/20/2024  Transthoracic Echocardiogram Name:Mingo White Date: 10/20/2024 :  1945 Ht:  (66in)  BP: 108 / 63 MRN:  [id number]    Age:  79years    Wt:  (106lb) HR: 60bpm Loc:  [facility]       Gndr: M          BSA: 1.53m^2 Sonographer: Charlene GOTTI Ordering:    Rehana Sanford Consulting:  Alexandru Rogers ---------------------------------------------------------------------------- History/Indications:  Ventricular tachycardia. ---------------------------------------------------------------------------- Procedure information:  A transthoracic complete 2D study was performed. Additional evaluation included M-mode, complete spectral Doppler, and color Doppler.  Patient status:  Inpatient.  Location:  [facility]    Comparison was made to the study of 2023.    This was a STAT study. Transthoracic echocardiography for ventricular function evaluation and assessment of valvular function. Image quality was suboptimal. The study was technically limited due to poor acoustic window availability, body habitus, and patient sitting up. Intravenous contrast (Definity) was administered to evaluate left ventricular function. ECG rhythm:   Paced rhythm ---------------------------------------------------------------------------- Conclusions: 1. Left ventricle: The cavity size was increased. Wall thickness was normal.    Systolic function was reduced. The estimated ejection fraction was    15-20%, by visual assessment. There was severe diffuse hypokinesis with    regional variations. Doppler parameters are consistent with abnormal left     ventricular relaxation - grade 1 diastolic dysfunction. 2. Right ventricle: Pacer wire noted in the right ventricle. 3. Left atrium: The left atrial volume was mildly increased. 4. Mitral valve: There was mild regurgitation. Impressions:  This study is compared with previous dated 11/14/2023: Left ventricular function remains poor. * ---------------------------------------------------------------------------- * Findings: Left ventricle:  The cavity size was increased. Wall thickness was normal. Systolic function was reduced. The estimated ejection fraction was 15-20%, by visual assessment. There was severe diffuse hypokinesis with regional variations. Doppler parameters are consistent with abnormal left ventricular relaxation - grade 1 diastolic dysfunction. Left atrium:  The left atrial volume was mildly increased. Right ventricle:  The cavity size was normal. Pacer wire noted in the right ventricle. Systolic function was low normal. Right atrium:  The atrium was normal in size. Mitral valve:  The valve was structurally normal. Leaflet separation was normal.  Doppler:  Transvalvular velocity was within the normal range. There was no evidence for stenosis. There was mild regurgitation.    The mean diastolic gradient was 1mm Hg. Aortic valve:  The valve was structurally normal. The valve was trileaflet. Cusp separation was normal.  Doppler:  Transvalvular velocity was within the normal range. There was no evidence for stenosis. There was trivial regurgitation. Tricuspid valve:  The valve is structurally normal. Leaflet separation was normal.  Doppler:  Transvalvular velocity was within the normal range. There was no evidence for stenosis. There was no significant regurgitation. Pulmonic valve:   The valve is structurally normal.  Doppler:  Transvalvular velocity was within the normal range. There was no evidence for stenosis. There was no significant regurgitation. Pericardium:   There was no pericardial effusion.  Aorta: Aortic root: The aortic root was normal. Ascending aorta: The ascending aorta was normal. Pulmonary arteries: The main pulmonary artery was normal-sized.  Systolic pressure could not be accurately estimated. Systemic veins:  Central venous respirophasic diameter changes are in the normal range (>50%). Inferior vena cava: The IVC was normal-sized. The IVC was normal-sized. ---------------------------------------------------------------------------- Measurements  Left ventricle                  Value        Ref  IVS thickness, ED, PLAX         0.8   cm     0.6 - 1.0  LV ID, ED, PLAX             (H) 6.6   cm     4.2 - 5.8  LV ID, ES, PLAX             (H) 6.1   cm     2.5 - 4.0  LV PW thickness, ED, PLAX       0.7   cm     0.6 - 1.0  IVS/LV PW ratio, ED, PLAX       1.06         ---------  LV PW/LV ID ratio, ED, PLAX     0.11         ---------  LV ejection fraction        (L) 18    %      52 - 72  LV e', lateral              (L) 4.2   cm/sec >=10.0  LV E/e', lateral                11           <=13  Aortic root                     Value        Ref  Aortic root ID, ED              3.1   cm     2.3 - 3.8  Ascending aorta                 Value        Ref  Ascending aorta ID, A-P, ED     3.1   cm     2.2 - 3.8  Left atrium                     Value        Ref  LA volume, S                    53    ml     18 - 58  LA volume/bsa, S            (H) 35    ml/m^2 16 - 34  LA volume, ES, 1-p A4C          57    ml     18 - 58  LA volume, ES, 1-p A2C          48    ml     18 - 58  LA volume, ES, A/L              55    ml     ---------  LA volume/bsa, ES, A/L      (H) 36    ml/m^2 16 - 34  Mitral valve                    Value        Ref  Mitral E-wave peak velocity     0.45  m/sec  ---------  Mitral A-wave peak velocity     0.75  m/sec  ---------  Mitral mean gradient, D         1     mm Hg  ---------  Mitral peak gradient, D         3     mm Hg  ---------  Mitral E/A ratio, peak          0.6          ---------  Mitral regurg  VTI, PISA         19.3  cm     ---------  Systemic veins                  Value        Ref  Estimated CVP                   3     mm Hg  ---------  Right ventricle                 Value        Ref  TAPSE, 2D                   (L) 1.31  cm     >=1.70 Legend: (L)  and  (H)  jessie values outside specified reference range. ---------------------------------------------------------------------------- Prepared and electronically signed by Israel Zuniga 10/20/2024 15:42     XR CHEST AP PORTABLE  (CPT=71045)    Result Date: 10/20/2024  PROCEDURE:  XR CHEST AP PORTABLE  (CPT=71045)  TECHNIQUE:  AP chest radiograph was obtained.  COMPARISON:  [facility] , XR, XR CHEST AP PORTABLE  (CPT=71045), 10/19/2024, 11:07 PM.  INDICATIONS:  dyspnea  PATIENT STATED HISTORY: (As transcribed by Technologist)  Patient offered no additional history at this time.    FINDINGS:  Stable cardiomegaly, changes of prior CABG and stable dual lead cardiac pacemaker.  Normal pulmonary vasculature.  Mild atelectasis in the left lung base noted.  Stable hyperaeration of the lungs.            CONCLUSION:  1. Mild subsegmental atelectasis within the left lung base noted. 2. Stable hyperaeration of the lungs suspicious for emphysema/COPD. 3. Stable cardiomegaly, stable changes of prior CABG and cardiac pacemaker.   LOCATION:  [facility]      Dictated by (CST): Dasha Rees DO on 10/20/2024 at 10:09 AM     Finalized by (CST): Dasha Rees DO on 10/20/2024 at 10:09 AM       XR CHEST AP PORTABLE  (CPT=71045)    Result Date: 10/19/2024  PROCEDURE:  XR CHEST AP PORTABLE  (CPT=71045)  TECHNIQUE:  AP chest radiograph was obtained.  COMPARISON:  [facility] , XR, XR CHEST AP PORTABLE  (CPT=71045), 11/14/2023, 0:18 AM.  [facility], XR, CHEST PA   LATERAL, 9/19/2008, 8:52 AM.  INDICATIONS:  ICD fired mulitple times  PATIENT STATED HISTORY: (As transcribed by Technologist)  Patient arrives from home due to ICD going off multiple times. Patient offered no additional history at  this times.    FINDINGS:  Stable left-sided AICD. Cardiac size and pulmonary vasculature are within normal limits. No pleural effusions. No pneumothorax.  Median sternotomy approximated by wire sutures.             CONCLUSION:  No acute pulmonary findings.   LOCATION:  [facility]      Dictated by (CST): Michele Mead MD on 10/19/2024 at 11:30 PM     Finalized by (CST): Michele Mead MD on 10/19/2024 at 11:30 PM              ASSESSMENT / PLAN:         Patient is an 79-year-old male significant past medical history of severe ischemic cardiomyopathy with an EF of 15%, chronic systolic heart failure, history of V. tach status post AICD, CAD status post CABG, type 2 diabetes, AAA status post EVAR, COPD    # Acute hypoxic respiratory failure likely secondary to  # Acute on chronic systolic heart failure exacerbation  # Elevated troponin  -Dyspnea with exertion, currently weaned off to 4 L of oxygen  -Most recent echo done last admission was on 10/21/2024 that actually shows an EF of 15 to 20%,  -Cardiology has been consulted, elevated proBNP, IV diuresis with Lasix 80 IV twice daily  -Monitor strict I's and O's, daily weights, no evidence of COPD exacerbation at this time, elevated troponin is likely demand ischemia    # History of V. tach status post AICD  -Continues to be in sinus rhythm, continue amiodarone  -Cardiology has been consulted, attempt to upgrade to CRT was unsuccessful last admission because of anatomy  -Continue telemetry monitor    # CAD status post CABG and recent PCI  -Recent PCI last admission in October, continue aspirin, Plavix, statin, beta-blocker    # COPD  -Continue Incruse does not appear to be COPD exacerbation at this time, DuoNebs as needed      # Type 2 diabetes  -Sliding scale insulin    # History of AAA status post EVAR    Advanced Care Planning    While discussing goals of care with the patient and their family, patient's wife was there and also voluntarily participated in an advanced  care planning discussion.  At this time patient and wife state that they do have enough support at home including home health and home PT, however given recurrent admissions of patients to the hospital discussed the options of palliative care and CODE STATUS, patient and patient's wife agreeable to getting palliative care doctor on board, at this time continue to be full code pending further discussion with the palliative care physician, explained in detail about the recurrent admissions, end-stage heart failure, unable to upgrade CRT all contributing to existing dyspnea.    18 minutes were spent in discussing advanced care planning. This time was exclusive of the documented time for this visit.    Prophy:  DVT: Lovenox  Deconditioning prevention: PT OT    Dispo: admit to [facility] with telemetry    Outpatient records reviewed confirming patient's medical history and medications.     Further recommendations pending patient's clinical course.  [facility] [place] hospitalist to continue to follow patient while in house    Time spent: greater than 95 minutes spent in d/w pt/family, coordination of care, and d/w staff.   Maru birmingham MD   Internal Medicine  DM Hospitalist  Pager: [phone]      **Certification      PHYSICIAN Certification of Need for Inpatient Hospitalization - Initial Certification    Patient will require inpatient services that will reasonably be expected to span two midnight's based on the clinical documentation in H+P.   Based on patients current state of illness, I anticipate that, after discharge, patient will require TBD.       [1]   Allergies  Allergen Reactions    Niaspan [Niacin, Antihyperlipidemic] ITCHING and FACE FLUSHING    Heparin UNKNOWN     States possible allergy to heparin. Blood sugar elevated when he was given heparin   [2]   Outpatient Medications Marked as Taking for the 11/4/24 encounter (Hospital Encounter)   Medication Sig Dispense Refill    acetaminophen 325 MG Oral Tab Take 2 tablets  (650 mg total) by mouth every 6 (six) hours as needed for Pain or Fever. 30 tablet 0    insulin aspart (NOVOLOG FLEXPEN) 100 Units/mL Subcutaneous Solution Pen-injector Test blood glucose 3 times daily with meals Inject 1 unit if blood glucose is between 141-180 mg/dL Inject 2 units if blood glucose is between 181-220 mg/dL Inject 3 units if blood glucose is between 221-260 mg/dL Inject 4 units if blood glucose is between 261-300 mg/dL Inject 5 units if blood glucose is between 301-350 mg/dL Call your physician if blood glucose is greater than 351 mg/dL, 5 each 3    Blood Glucose Monitoring Suppl (ONETOUCH VERIO FLEX SYSTEM) w/Device Does not apply Kit Test blood sugar 3 times per day. 1 kit 0    OneTouch Delica Lancets 33G Does not apply Misc Test blood sugar 3 times per day. 100 each 6    pantoprazole 40 MG Oral Tab EC Take 1 tablet (40 mg total) by mouth every morning before breakfast. 90 tablet 0    furosemide 40 MG Oral Tab Take 1 tablet (40 mg total) by mouth daily. 60 tablet 1    atorvastatin 40 MG Oral Tab Take 1 tablet (40 mg total) by mouth nightly. 30 tablet 1    umeclidinium-vilanterol 62.5-25 MCG/ACT Inhalation Aerosol Powder, Breath Activated Inhale 1 puff into the lungs daily. 1 each 0    amiodarone 200 MG Oral Tab Take 1 tablet (200 mg total) by mouth 2 (two) times daily with meals. 90 tablet 0    Glucose Blood (PRODIGY NO CODING BLOOD GLUC) In Vitro Strip Use as directed to test blood sugar once daily 100 strip 3    Glucose Blood (ONETOUCH VERIO) In Vitro Strip Test blood sugar 3 times per day. 100 strip 6    Prodigy Lancets 28G Does not apply Misc Test once daily 100 each 3    Lancet Device Does not apply Misc MEASURE HOME GLUCOSE DAILY --DIRECTED 1 each 11    carvedilol 12.5 MG Oral Tab Take 1 tablet (12.5 mg total) by mouth 2 (two) times daily. 180 tablet 3    CLOPIDOGREL BISULFATE 75 MG Oral Tab TAKE 1 TABLET DAILY 90 tablet 3    ASPIRIN 81 MG OR TABS Take 1 tablet (81 mg total) by mouth at  bedtime.

## 2024-11-05 NOTE — PROGRESS NOTES
Heart Failure Nurse  Progress Note    Patient was evaluated by the Heart Failure Nurse  for understanding, verbalization, demonstration, and recall of education related to heart failure, overall adherence to the behaviors necessary to maintain a compensated status, and risk for readmission.     Patient assessment:    Patient is able to verbalize signs/symptoms fluid overload/impending HF exacerbation and who to contact with problems                                          _X__ yes  ___ no      Patient is following a 2000 mg sodium diet                                             _X__ yes  ___ no    If no, barriers to 2000 mg sodium diet:_______________________________________________    Patient informed of 2-Part dietician classes that is free if sign up within 30 days of discharge or $40  _X__ yes  ___ no      Patient is adherent to medication regimen                                              _X__ yes  ___ no    If no, barriers to medication regimen:    Patient has sufficient funds to purchase medication                      _X__ yes  ___ no      Patient has a scale in the home              __X_ yes  ___ no      Patient is adherent to daily weight monitoring                                        ___ yes   __X_ no    If no, barriers to daily weight monitoring: Per patient, his weight fluctuates so he does not weigh himself everyday, but he will do so moving forward.    Symptom Tracker Worksheet reviewed with patient  _X__ yes   ___ no      Patient verbalizes understanding of stoplight/heart failure zones          _X__ yes   ___ no      Patient understands the importance of 7-day follow-up appointment      __X_ yes  ___ no    Appointment Date:          Patient has adequate transportation to attend follow-up appointments    __X_ yes  ___ no    If no, was referral to Social Work made  ___yes  ___ no      Family/Friend present during education: none    Additional consultations required: BRUNO Whaley  STORMY Courtney(signature)  Extension [phone]

## 2024-11-05 NOTE — CONSULTS
[facility]    Mingo White Patient Status:  Inpatient    1945 MRN [id number]   Location [facility] 0SW-A Attending Henrry Lantigua MD   Hosp Day # 0 PCP Daren Espino MD     Date of Admission: 2024 11:11 PM  Admission Diagnosis: Hypoxia [R09.02]  Elevated troponin [R79.89]  Anemia, unspecified type [D64.9]  Leukocytosis, unspecified type [D72.829]  Acute congestive heart failure, unspecified heart failure type (HCC) [I50.9]  Reason for Consult: dyspnea     History of Present Illness: 80 y/o with h/o COPD, HFrEF with LVEF 15% s/p AICD, CAD s/p CABG, DM2, recently hospitalized for one week for CHF exac and dyspnea, course c/b VT and AICD firing; needed angio with PCI to OM, eventually dc'd home on room air.  - over the last week noted increased WALTERS, severe orthopnea, LE edema.  Occ cough. No fevers/chills/N/V/D/dysuria/hemoptysis.  Came to ED and started on diuretic after noted to be hypoxic.  Currently already feels better and has been weaned down to room air.     Past Medical History:    Anesthesia complication    was \"out of it\" for a long time after sedation with previous colonoscopies    Back problem    Carotid artery disease (HCC)    Congestive heart disease (HCC)    CORONARY ARTERY DISEASE    Coronary atherosclerosis    DIABETES    Diabetes (HCC)    Gastric ulcer    HEART ATTACK    age 61    High blood pressure    HYPERLIPIDEMIA    Sinus drainage    with weather changes      Past Surgical History:   Procedure Laterality Date    Cardiac defibrillator placement      Medtronic dual chamber ICD, not pacemaker dependent    Colonoscopy  11 - DANNIE Espinoza    adenoma, fair prep, hemorrhoids, repeat -.    Colonoscopy  4/10/2014    DANNIE Espinoza. 6 adenomas, hemorrhoids, repeat  w/ split 4L prep.     Colonoscopy  2017    Diverticulosis, Hemorrhoids.  Repeat PRN    Colonoscopy N/A 2017    Procedure: COLONOSCOPY;  Surgeon: Brad Tapia MD;  Location:  ENDOSCOPY    Open heart  surgery (pbp)      Double bypass - done in [facility]    Other      repair AAA    Upper gi endoscopy performed  4/6/11 - DANNIE francis , no H pylori.       Allergies[1]     Social History:   reports that he has been smoking cigarettes. He has a 25 pack-year smoking history. He has never used smokeless tobacco. He reports that he does not drink alcohol and does not use drugs.      Family History:  Family History   Problem Relation Age of Onset    Heart Disorder Father     Heart Disorder Mother     Diabetes Maternal Grandmother           Current Medications:    Current Facility-Administered Medications:     amiodarone (Pacerone) tab 200 mg, 200 mg, Oral, BID with meals    aspirin chewable tab 81 mg, 81 mg, Oral, Nightly    atorvastatin (Lipitor) tab 40 mg, 40 mg, Oral, Nightly    carvedilol (Coreg) tab 12.5 mg, 12.5 mg, Oral, BID with meals    clopidogrel (Plavix) tab 75 mg, 75 mg, Oral, Daily    pantoprazole (Protonix) DR tab 40 mg, 40 mg, Oral, QAM AC    umeclidinium-vilanterol (Anoro Ellipta) 62.5-25 MCG/ACT inhaler 1 puff, 1 puff, Inhalation, Daily    enoxaparin (Lovenox) 30 MG/0.3ML SUBQ injection 30 mg, 30 mg, Subcutaneous, Daily    acetaminophen (Tylenol Extra Strength) tab 500 mg, 500 mg, Oral, Q4H PRN    polyethylene glycol (PEG 3350) (Miralax) 17 g oral packet 17 g, 17 g, Oral, Daily PRN    sennosides (Senokot) tab 17.2 mg, 17.2 mg, Oral, Nightly PRN    bisacodyl (Dulcolax) 10 MG rectal suppository 10 mg, 10 mg, Rectal, Daily PRN    metoclopramide (Reglan) 5 mg/mL injection 5 mg, 5 mg, Intravenous, Q8H PRN    furosemide (Lasix) 10 mg/mL injection 80 mg, 80 mg, Intravenous, BID (Diuretic)    glucose (Dex4) 15 GM/59ML oral liquid 15 g, 15 g, Oral, Q15 Min PRN **OR** glucose (Glutose) 40% oral gel 15 g, 15 g, Oral, Q15 Min PRN **OR** glucose-vitamin C (Dex-4) chewable tab 4 tablet, 4 tablet, Oral, Q15 Min PRN **OR** dextrose 50% injection 50 mL, 50 mL, Intravenous, Q15 Min PRN **OR** glucose (Dex4) 15  GM/59ML oral liquid 30 g, 30 g, Oral, Q15 Min PRN **OR** glucose (Glutose) 40% oral gel 30 g, 30 g, Oral, Q15 Min PRN **OR** glucose-vitamin C (Dex-4) chewable tab 8 tablet, 8 tablet, Oral, Q15 Min PRN    insulin aspart (NovoLOG) 100 Units/mL FlexPen 2-10 Units, 2-10 Units, Subcutaneous, TID AC and HS     REVIEW OF SYSTEMS:   As listed in HPI, otherwise ten point ROS is negative.     OBJECTIVE:  Patient Vitals for the past 24 hrs:   BP Temp Temp src Pulse Resp SpO2 Height Weight   11/05/24 1200 101/62 98 °F (36.7 °C) Oral 61 25 94 % -- --   11/05/24 1030 -- -- -- 69 18 95 % -- --   11/05/24 0927 -- -- -- -- -- 93 % -- --   11/05/24 0845 106/51 97.3 °F (36.3 °C) Oral 75 26 95 % -- 132 lb 11.2 oz (60.2 kg)   11/05/24 0800 -- -- -- -- -- 100 % -- --   11/05/24 0430 114/79 97.6 °F (36.4 °C) Oral 78 20 100 % -- 132 lb 9.6 oz (60.1 kg)   11/05/24 0349 106/55 -- -- 68 (!) 33 100 % -- --   11/05/24 0151 110/61 -- -- 74 (!) 32 100 % -- --   11/04/24 2358 121/70 -- -- 89 (!) 34 100 % -- --   11/04/24 2315 120/71 98.3 °F (36.8 °C) Temporal 91 (!) 27 99 % 170.2 cm (5' 7\") 130 lb (59 kg)     O2 requirement: on room air    Wt Readings from Last 3 Encounters:   11/05/24 132 lb 11.2 oz (60.2 kg)   10/28/24 134 lb 11.2 oz (61.1 kg)   11/15/23 113 lb 1.6 oz (51.3 kg)        I/O last 3 completed shifts:  In: -   Out: 125 [Urine:125]  I/O this shift:  In: -   Out: 790 [Urine:790]    General appearance: alert, appears stated age, and cooperative  Head: Normocephalic, without obvious abnormality, atraumatic  Neck: no adenopathy, no carotid bruit, and supple, symmetrical, trachea midline  Back: symmetric, no curvature. ROM normal. No CVA tenderness.  Lungs:  faint bibasilar crackles, no wheezing, good air entry  Chest wall: no tenderness  Heart: regular rate and rhythm  Abdomen: soft, non-tender; bowel sounds normal; no masses,  no organomegaly  Extremities: edema trace  Pulses: 2+ and symmetric  Skin: Skin color, texture, turgor normal.  No rashes or lesions     Lab Results   Component Value Date    WBC 19.0 11/04/2024    RBC 3.37 11/04/2024    HGB 10.2 11/04/2024    HCT 31.1 11/04/2024    MCV 92.3 11/04/2024    MCH 30.3 11/04/2024    MCHC 32.8 11/04/2024    RDW 16.8 11/04/2024    .0 11/04/2024     Lab Results   Component Value Date     11/04/2024    K 4.0 11/04/2024     11/04/2024    CO2 24.0 11/04/2024    BUN 37 11/04/2024    CREATSERUM 2.05 11/04/2024     11/04/2024    CA 8.7 11/04/2024     Lab Results   Component Value Date     11/04/2024    K 4.0 11/04/2024     11/04/2024    CO2 24.0 11/04/2024    BUN 37 11/04/2024    CREATSERUM 2.05 11/04/2024     11/04/2024    CA 8.7 11/04/2024    ALKPHO 74 11/04/2024    ALT 19 11/04/2024    AST 21 11/04/2024    BILT 0.7 11/04/2024    ALB 3.4 11/04/2024    TP 6.2 11/04/2024     Lab Results   Component Value Date    INR 1.01 10/19/2024    INR 1.0 (L) 08/08/2008    INR 1.0 (L) 12/11/2007          Imaging: CXR: pulm vasc congestion with early edema vs infiltrates     ASSESSMENT/PLAN:  Acute hypoxic resp failure - due to CHF exacerbation superimposed on significant COPD.  No clinical signs of infection; has leukocytosis but was on steroids recently.  - weaned to room air, saturating 98% at rest  - BD nebs prn  - bronchial hygiene  COPD, no exac  - cont home inhalers- was on Anoro  - BD nebs, as above  ID- no obvious signs of PNA and clinically improving with diuresis  - check PCT, repeat CXR tomorrow to assess for improvement  - hold on empiric abx for now  HFrEF- with exacerbation  - ongoing diuresis per cards  Proph - LMWH  Dispo - hopeful for dc home tomorrow  - will follow    Pete Alicea MD  11/5/2024  4:00 PM          [1]   Allergies  Allergen Reactions    Niaspan [Niacin, Antihyperlipidemic] ITCHING and FACE FLUSHING    Heparin UNKNOWN     States possible allergy to heparin. Blood sugar elevated when he was given heparin

## 2024-11-05 NOTE — ED PROVIDER NOTES
Patient Seen in: [facility] Emergency Department      History     Chief Complaint   Patient presents with    Difficulty Breathing     Stated Complaint: sob    Subjective:   HPI      The 79-year-old male patient presented with shortness of breath. He reported that he had a similar episode last November. He was given 80 mg of an unspecified medication at that time, which he believes he needs again. He also mentioned that he had a pacemaker put in last week and had undergone two procedures within 48 hours. His cardiologist is Alexis Vora. He also mentioned that he had an angiogram and a stent placement. The patient was experiencing difficulty in breathing, which he compared to a previous episode of heart failure. He did not report any fever but mentioned a yellow/green cough. He denied experiencing any chest pain but felt tight and as if he couldn't get enough oxygen. He confirmed that he does not use oxygen at home.    Objective:     Past Medical History:    Anesthesia complication    was \"out of it\" for a long time after sedation with previous colonoscopies    Back problem    Carotid artery disease (HCC)    Congestive heart disease (HCC)    CORONARY ARTERY DISEASE    Coronary atherosclerosis    DIABETES    Diabetes (HCC)    Gastric ulcer    HEART ATTACK    age 61    High blood pressure    HYPERLIPIDEMIA    Sinus drainage    with weather changes              Past Surgical History:   Procedure Laterality Date    Cardiac defibrillator placement  2007    Medtronic dual chamber ICD, not pacemaker dependent    Colonoscopy  4/6/11 - DANNIE Espinoza    adenoma, fair prep, hemorrhoids, repeat 2013-14.    Colonoscopy  4/10/2014    DANNIE Espinoza. 6 adenomas, hemorrhoids, repeat 2017 w/ split 4L prep.     Colonoscopy  07/24/2017    Diverticulosis, Hemorrhoids.  Repeat PRN    Colonoscopy N/A 7/24/2017    Procedure: COLONOSCOPY;  Surgeon: Brad Tapia MD;  Location:  ENDOSCOPY    Open heart surgery (pbp)      Double bypass - done in  [facility]    Other      repair AAA    Upper gi endoscopy performed  4/6/11 - DANNIE BLACKMAN, no H pylori.                 Social History     Socioeconomic History    Marital status:    Occupational History    Occupation: chemical technologist     Comment: retire age 54   Tobacco Use    Smoking status: Every Day     Current packs/day: 0.50     Average packs/day: 0.5 packs/day for 50.0 years (25.0 ttl pk-yrs)     Types: Cigarettes    Smokeless tobacco: Never   Vaping Use    Vaping status: Never Used   Substance and Sexual Activity    Alcohol use: No     Alcohol/week: 0.0 standard drinks of alcohol     Comment: quit 2006    Drug use: No   Other Topics Concern     Service Yes     Comment: [employer] - [employer] exposure    Blood Transfusions No    Caffeine Concern No    Occupational Exposure No    Hobby Hazards No    Sleep Concern No    Stress Concern No    Weight Concern No    Special Diet Yes    Back Care Yes    Exercise Yes    Seat Belt Yes   Social History Narrative    Likes to do crossword puzzles    Active with grand kids and their sports    Socializes with friends     Social Drivers of Health     Food Insecurity: No Food Insecurity (10/20/2024)    Food Insecurity     Food Insecurity: Never true   Transportation Needs: No Transportation Needs (10/20/2024)    Transportation Needs     Lack of Transportation: No   Housing Stability: Low Risk  (10/20/2024)    Housing Stability     Housing Instability: No                  Physical Exam     ED Triage Vitals [11/04/24 2315]   /71   Pulse 91   Resp (!) 27   Temp 98.3 °F (36.8 °C)   Temp src Temporal   SpO2 99 %   O2 Device Nasal cannula       Current Vitals:   Vital Signs  BP: 106/55  Pulse: 68  Resp: (!) 33  Temp: 98.3 °F (36.8 °C)  Temp src: Temporal  MAP (mmHg): 71    Oxygen Therapy  SpO2: 100 %  O2 Device: Nasal cannula  O2 Flow Rate (L/min): 4 L/min        Physical Exam    Vital signs reviewed  General appearance: Patient is alert and  in mild respiratory distress  HEENT: Pupils equal react to light extraocular muscles intact no scleral icterus, mucous membranes are moist, there is no erythema or exudate in the posterior pharynx  Neck: Supple no JVD no lymphadenopathy no meningismus no carotid bruit  CV: Regular rate and rhythm no murmur rub  Respiratory: Coarse breath sounds bilaterally with crackles in both bases mild accessory muscle use noted   abdomen: Soft nontender nondistended, no rebound no guarding  no hepatosplenomegaly bowel sounds are present , no pulsatile mass  Extremities: No clubbing cyanosis 1+ pitting edema both lower extremities  Neuro: Cranial nerves II through XII intact with no gross focal sensory or motor abnormality.      ED Course     Labs Reviewed   CBC WITH DIFFERENTIAL WITH PLATELET - Abnormal; Notable for the following components:       Result Value    WBC 19.0 (*)     RBC 3.37 (*)     HGB 10.2 (*)     HCT 31.1 (*)     .0 (*)     Neutrophil Absolute Prelim 15.27 (*)     Neutrophil Absolute 15.27 (*)     Monocyte Absolute 1.05 (*)     All other components within normal limits   COMP METABOLIC PANEL (14) - Abnormal; Notable for the following components:    Glucose 212 (*)     Sodium 135 (*)     BUN 37 (*)     Creatinine 2.05 (*)     eGFR-Cr 32 (*)     All other components within normal limits   TROPONIN I HIGH SENSITIVITY - Abnormal; Notable for the following components:    Troponin I (High Sensitivity) 68 (*)     All other components within normal limits   PRO BETA NATRIURETIC PEPTIDE - Abnormal; Notable for the following components:    Pro-Beta Natriuretic Peptide 30,699 (*)     All other components within normal limits   PROCALCITONIN - Abnormal; Notable for the following components:    Procalcitonin 0.09 (*)     All other components within normal limits   LACTIC ACID, PLASMA - Normal   LIPID PANEL - Normal   SARS-COV-2/FLU A AND B/RSV BY PCR (GENEXPERT) - Normal    Narrative:     This test is intended for the  qualitative detection and differentiation of SARS-CoV-2, influenza A, influenza B, and respiratory syncytial virus (RSV) viral RNA in nasopharyngeal or nares swabs from individuals suspected of respiratory viral infection consistent with COVID-19 by their healthcare provider. Signs and symptoms of respiratory viral infection due to SARS-CoV-2, influenza, and RSV can be similar.    Test performed using the Xpert Xpress SARS-CoV-2/FLU/RSV (real time RT-PCR)  assay on the GeneXpert instrument, [employer], [place] [address].   This test is being used under the Food and Drug Administration's Emergency Use Authorization.    The authorized Fact Sheet for Healthcare Providers for this assay is available upon request from the laboratory.   RAINBOW DRAW LAVENDER   RAINBOW DRAW LIGHT GREEN   RAINBOW DRAW BLUE   RAINBOW DRAW GOLD     EKG    Rate, intervals and axes as noted on EKG Report.  Rate: 92  Rhythm: Sinus Rhythm  Reading: Unchanged from previous left bundle branch block                Patient was evaluated the emergency department had a CBC chemistry BNP procalcitonin lactic acid COVID flu and RSV.  BNP was significantly elevated at 30,000.  Pro-Alejandro was slightly elevated 0.09 patient does have a white count of 19,000 and troponin was barely elevated.    XR CHEST AP PORTABLE  (CPT=71045)    Result Date: 11/4/2024  CONCLUSION:  1. Mild pulmonary vascular congestion and patchy bilateral ground-glass type parahilar opacities, suspicious for mild pulmonary edema/early congestive failure. 2. Minimal blunting of the costophrenic angles bilaterally suspicious for atelectasis versus mild effusions. 3. Stable cardiomegaly.   LOCATION:  [facility]      Dictated by (CST): Dasha Rees DO on 11/04/2024 at 11:53 PM     Finalized by (CST): Dasha Rees DO on 11/04/2024 at 11:54 PM          Patient does appear to be in probable CHF and vascular congestion however there is some patchy bilateral groundglass opacities consistent with  failure.  He has had no fever no cough this point we will hold off on antibiotics and see if diuresis works.  Discussed case with cardiology and the hospitalist who agree with plan.       MDM      Differential diagnosis reflecting the complexity of care include: Congestive heart failure, pneumonia, COPD, viral URI    Comorbidities that add complexity to management include: Recent pacemaker placement history of CHF    External chart review was done and was noted: Discharge summary from 10/28/2024 was reviewed patient had a PCI done and stent placed it was noted to have ischemic cardiomyopathy and chronic systolic heart failure      Discussions of management was done with: Cardiology and the hospitalist were contacted and agree with plan    My independent interpretation of studies of: Chest x-ray shows mild pulmonary vascular congestion and pulmonary edema.  Also some groundglass opacities but no overt pneumonia    Diagnostic tests and medications considered but not ordered were: Considered IV antibiotics but at this point do feel as failure      Shared decision making was done by myself patient and his family along with cardiology and the hospitalist.  Patient be admitted for diuresis and further workup    Patient was evaluated in the emergency department and at this point patient will need admission for further management of medical condition.  Patient was stable in the emergency department and will be transferred to floor for further definitive care.  Patient's questions were answered.    This note was prepared using Dragon Medical voice recognition dictation software. As a result errors may occur. When identified these errors have been corrected. While every attempt is made to correct errors during dictation discrepancies may still exist        Admission disposition: 11/5/2024  2:28 AM           Medical Decision Making      Disposition and Plan     Clinical Impression:  1. Acute congestive heart failure,  unspecified heart failure type (HCC)    2. Hypoxia    3. Anemia, unspecified type    4. Leukocytosis, unspecified type    5. Elevated troponin         Disposition:  Admit  11/5/2024  2:28 am    Follow-up:  No follow-up provider specified.        Medications Prescribed:  Current Discharge Medication List              Supplementary Documentation:         Hospital Problems       Present on Admission  Date Reviewed: 3/21/2022            ICD-10-CM Noted POA    * (Principal) Acute congestive heart failure, unspecified heart failure type (HCC) I50.9 11/5/2024 Unknown

## 2024-11-06 DIAGNOSIS — I50.30 (HFPEF) HEART FAILURE WITH PRESERVED EJECTION FRACTION (HCC): Primary | ICD-10-CM

## 2024-11-06 PROBLEM — Z71.89 GOALS OF CARE, COUNSELING/DISCUSSION: Status: ACTIVE | Noted: 2022-03-21

## 2024-11-06 PROBLEM — Z51.5 PALLIATIVE CARE ENCOUNTER: Status: ACTIVE | Noted: 2022-03-21

## 2024-11-06 PROBLEM — R79.89 ELEVATED TROPONIN: Status: RESOLVED | Noted: 2024-11-05 | Resolved: 2024-11-06

## 2024-11-06 PROBLEM — I50.9 ACUTE CONGESTIVE HEART FAILURE, UNSPECIFIED HEART FAILURE TYPE (HCC): Status: RESOLVED | Noted: 2024-11-05 | Resolved: 2024-11-06

## 2024-11-06 PROBLEM — D72.829 LEUKOCYTOSIS, UNSPECIFIED TYPE: Status: RESOLVED | Noted: 2023-11-14 | Resolved: 2024-11-06

## 2024-11-06 PROBLEM — R09.02 HYPOXIA: Status: RESOLVED | Noted: 2023-11-14 | Resolved: 2024-11-06

## 2024-11-06 LAB
ANION GAP SERPL CALC-SCNC: 5 MMOL/L (ref 0–18)
BASOPHILS # BLD AUTO: 0.03 X10(3) UL (ref 0–0.2)
BASOPHILS NFR BLD AUTO: 0.4 %
BUN BLD-MCNC: 36 MG/DL (ref 9–23)
CALCIUM BLD-MCNC: 9 MG/DL (ref 8.7–10.4)
CHLORIDE SERPL-SCNC: 104 MMOL/L (ref 98–112)
CO2 SERPL-SCNC: 26 MMOL/L (ref 21–32)
CREAT BLD-MCNC: 1.98 MG/DL
DEPRECATED HBV CORE AB SER IA-ACNC: 506 NG/ML
EGFRCR SERPLBLD CKD-EPI 2021: 34 ML/MIN/1.73M2 (ref 60–?)
EOSINOPHIL # BLD AUTO: 0.15 X10(3) UL (ref 0–0.7)
EOSINOPHIL NFR BLD AUTO: 1.8 %
ERYTHROCYTE [DISTWIDTH] IN BLOOD BY AUTOMATED COUNT: 16.9 %
GLUCOSE BLD-MCNC: 124 MG/DL (ref 70–99)
GLUCOSE BLD-MCNC: 132 MG/DL (ref 70–99)
GLUCOSE BLD-MCNC: 162 MG/DL (ref 70–99)
HCT VFR BLD AUTO: 28.7 %
HGB BLD-MCNC: 9.6 G/DL
IMM GRANULOCYTES # BLD AUTO: 0.04 X10(3) UL (ref 0–1)
IMM GRANULOCYTES NFR BLD: 0.5 %
IRON SATN MFR SERPL: 13 %
IRON SERPL-MCNC: 27 UG/DL
LYMPHOCYTES # BLD AUTO: 0.99 X10(3) UL (ref 1–4)
LYMPHOCYTES NFR BLD AUTO: 12 %
MAGNESIUM SERPL-MCNC: 1.7 MG/DL (ref 1.6–2.6)
MCH RBC QN AUTO: 30.9 PG (ref 26–34)
MCHC RBC AUTO-ENTMCNC: 33.4 G/DL (ref 31–37)
MCV RBC AUTO: 92.3 FL
MONOCYTES # BLD AUTO: 0.44 X10(3) UL (ref 0.1–1)
MONOCYTES NFR BLD AUTO: 5.3 %
NEUTROPHILS # BLD AUTO: 6.63 X10 (3) UL (ref 1.5–7.7)
NEUTROPHILS # BLD AUTO: 6.63 X10(3) UL (ref 1.5–7.7)
NEUTROPHILS NFR BLD AUTO: 80 %
NT-PROBNP SERPL-MCNC: ABNORMAL PG/ML (ref ?–450)
OSMOLALITY SERPL CALC.SUM OF ELEC: 290 MOSM/KG (ref 275–295)
PLATELET # BLD AUTO: 113 10(3)UL (ref 150–450)
POTASSIUM SERPL-SCNC: 3.1 MMOL/L (ref 3.5–5.1)
RBC # BLD AUTO: 3.11 X10(6)UL
SODIUM SERPL-SCNC: 135 MMOL/L (ref 136–145)
TOTAL IRON BINDING CAPACITY: 210 UG/DL (ref 250–425)
TRANSFERRIN SERPL-MCNC: 152 MG/DL (ref 215–365)
WBC # BLD AUTO: 8.3 X10(3) UL (ref 4–11)

## 2024-11-06 PROCEDURE — 99223 1ST HOSP IP/OBS HIGH 75: CPT | Performed by: NURSE PRACTITIONER

## 2024-11-06 RX ORDER — FUROSEMIDE 10 MG/ML
80 INJECTION INTRAMUSCULAR; INTRAVENOUS ONCE
Status: COMPLETED | OUTPATIENT
Start: 2024-11-06 | End: 2024-11-06

## 2024-11-06 RX ORDER — LISINOPRIL 5 MG/1
5 TABLET ORAL DAILY
Status: ON HOLD | COMMUNITY
End: 2024-11-06

## 2024-11-06 RX ORDER — LISINOPRIL 5 MG/1
5 TABLET ORAL DAILY
Status: DISCONTINUED | OUTPATIENT
Start: 2024-11-06 | End: 2024-11-06

## 2024-11-06 RX ORDER — LISINOPRIL 5 MG/1
5 TABLET ORAL DAILY
Qty: 30 TABLET | Refills: 0 | Status: SHIPPED | OUTPATIENT
Start: 2024-11-06 | End: 2024-12-06

## 2024-11-06 RX ORDER — POTASSIUM CHLORIDE 1500 MG/1
40 TABLET, EXTENDED RELEASE ORAL EVERY 4 HOURS
Status: COMPLETED | OUTPATIENT
Start: 2024-11-06 | End: 2024-11-06

## 2024-11-06 RX ORDER — TORSEMIDE 20 MG/1
40 TABLET ORAL DAILY
Status: DISCONTINUED | OUTPATIENT
Start: 2024-11-07 | End: 2024-11-06

## 2024-11-06 RX ORDER — MAGNESIUM OXIDE 400 MG/1
400 TABLET ORAL ONCE
Status: COMPLETED | OUTPATIENT
Start: 2024-11-06 | End: 2024-11-06

## 2024-11-06 NOTE — PROGRESS NOTES
[facility] received palliative referral for community PC services. Waiting to obtain insurance (verification/authorization) prior to pursuing.     Beata Camarena  [facility] Liaison   [phone]

## 2024-11-06 NOTE — PLAN OF CARE
PT A/O, 95% ON RA, SO WITH EXERTION, NON LABORED AT REST, SR/APACED, TRACE LE EDEMA, REFUSED SCDS, VOIDING PER URINAL, UP WITH SB ASSIST, LT CHEST DRESSING D/I, DENIES PAIN, LABS IN AM, INSTRUCTED PT ON POC    Problem: CARDIOVASCULAR - ADULT  Goal: Absence of cardiac arrhythmias or at baseline  Description: INTERVENTIONS:  - Continuous cardiac monitoring, monitor vital signs, obtain 12 lead EKG if indicated  - Evaluate effectiveness of antiarrhythmic and heart rate control medications as ordered  - Initiate emergency measures for life threatening arrhythmias  - Monitor electrolytes and administer replacement therapy as ordered  Outcome: Progressing     Problem: CARDIOVASCULAR - ADULT  Goal: Absence of cardiac arrhythmias or at baseline  Description: INTERVENTIONS:  - Continuous cardiac monitoring, monitor vital signs, obtain 12 lead EKG if indicated  - Evaluate effectiveness of antiarrhythmic and heart rate control medications as ordered  - Initiate emergency measures for life threatening arrhythmias  - Monitor electrolytes and administer replacement therapy as ordered  Outcome: Progressing

## 2024-11-06 NOTE — CM/SW NOTE
DW[facility] received order to arrange community palliative care. Noted DC order placed.     [facility] sent referral to [facility] in [place]. [facility] notified [facility] via [facility] of pt's discharge today.     PABLO Sloan  Discharge Planner

## 2024-11-06 NOTE — PROGRESS NOTES
[facility]    Mingo White Patient Status:  Inpatient    1945 MRN [id number]   Location [facility] 0SW-A Attending Henrry Lantigua MD   Hosp Day # 1 PCP Daren Espino MD     Date of Admission: 2024 11:11 PM  Admission Diagnosis: Hypoxia [R09.02]  Elevated troponin [R79.89]  Anemia, unspecified type [D64.9]  Leukocytosis, unspecified type [D72.829]  Acute congestive heart failure, unspecified heart failure type (HCC) [I50.9]    Subjective: Feels much better. Dyspnea is significantly improved. No new complaints.      Past Medical History:    Anesthesia complication    was \"out of it\" for a long time after sedation with previous colonoscopies    Back problem    Carotid artery disease (HCC)    Congestive heart disease (HCC)    CORONARY ARTERY DISEASE    Coronary atherosclerosis    DIABETES    Diabetes (HCC)    Gastric ulcer    HEART ATTACK    age 61    High blood pressure    HYPERLIPIDEMIA    Sinus drainage    with weather changes        Current Medications:    Current Facility-Administered Medications:     [START ON 2024] torsemide (Demadex) tab 40 mg, 40 mg, Oral, Daily    lisinopril (Prinivil; Zestril) tab 5 mg, 5 mg, Oral, Daily    potassium chloride (Klor-Con M20) tab 40 mEq, 40 mEq, Oral, Q4H    amiodarone (Pacerone) tab 200 mg, 200 mg, Oral, BID with meals    aspirin chewable tab 81 mg, 81 mg, Oral, Nightly    atorvastatin (Lipitor) tab 40 mg, 40 mg, Oral, Nightly    carvedilol (Coreg) tab 12.5 mg, 12.5 mg, Oral, BID with meals    clopidogrel (Plavix) tab 75 mg, 75 mg, Oral, Daily    pantoprazole (Protonix) DR tab 40 mg, 40 mg, Oral, QAM AC    umeclidinium-vilanterol (Anoro Ellipta) 62.5-25 MCG/ACT inhaler 1 puff, 1 puff, Inhalation, Daily    enoxaparin (Lovenox) 30 MG/0.3ML SUBQ injection 30 mg, 30 mg, Subcutaneous, Daily    acetaminophen (Tylenol Extra Strength) tab 500 mg, 500 mg, Oral, Q4H PRN    polyethylene glycol (PEG 3350) (Miralax) 17 g oral packet 17 g, 17 g, Oral, Daily  PRN    sennosides (Senokot) tab 17.2 mg, 17.2 mg, Oral, Nightly PRN    bisacodyl (Dulcolax) 10 MG rectal suppository 10 mg, 10 mg, Rectal, Daily PRN    metoclopramide (Reglan) 5 mg/mL injection 5 mg, 5 mg, Intravenous, Q8H PRN    glucose (Dex4) 15 GM/59ML oral liquid 15 g, 15 g, Oral, Q15 Min PRN **OR** glucose (Glutose) 40% oral gel 15 g, 15 g, Oral, Q15 Min PRN **OR** glucose-vitamin C (Dex-4) chewable tab 4 tablet, 4 tablet, Oral, Q15 Min PRN **OR** dextrose 50% injection 50 mL, 50 mL, Intravenous, Q15 Min PRN **OR** glucose (Dex4) 15 GM/59ML oral liquid 30 g, 30 g, Oral, Q15 Min PRN **OR** glucose (Glutose) 40% oral gel 30 g, 30 g, Oral, Q15 Min PRN **OR** glucose-vitamin C (Dex-4) chewable tab 8 tablet, 8 tablet, Oral, Q15 Min PRN    insulin aspart (NovoLOG) 100 Units/mL FlexPen 2-10 Units, 2-10 Units, Subcutaneous, TID AC and HS     OBJECTIVE:  /56 (BP Location: Right arm)   Pulse 60   Temp 97.9 °F (36.6 °C) (Oral)   Resp 20   Ht 5' 7\" (1.702 m)   Wt 125 lb 11.2 oz (57 kg)   SpO2 100%   BMI 19.69 kg/m²   O2 requirement: on room air      I/O last 3 completed shifts:  In: -   Out: 1965 [Urine:1965]  I/O this shift:  In: -   Out: 425 [Urine:425]    General appearance: alert, appears stated age, and cooperative  Head: Normocephalic, without obvious abnormality, atraumatic  Neck: no adenopathy, no carotid bruit, and supple, symmetrical, trachea midline  Back: symmetric, no curvature. ROM normal. No CVA tenderness.  Lungs: clear bilaterally  Chest wall: no tenderness  Heart: regular rate and rhythm  Abdomen: soft, non-tender; bowel sounds normal; no masses,  no organomegaly  Extremities: edema trace  Pulses: 2+ and symmetric  Skin: Skin color, texture, turgor normal. No rashes or lesions     Lab Results   Component Value Date    WBC 8.3 11/06/2024    RBC 3.11 11/06/2024    HGB 9.6 11/06/2024    HCT 28.7 11/06/2024    MCV 92.3 11/06/2024    MCH 30.9 11/06/2024    MCHC 33.4 11/06/2024    RDW 16.9  11/06/2024    .0 11/06/2024     Lab Results   Component Value Date     11/06/2024    K 3.1 11/06/2024     11/06/2024    CO2 26.0 11/06/2024    BUN 36 11/06/2024    CREATSERUM 1.98 11/06/2024     11/06/2024    CA 9.0 11/06/2024     Lab Results   Component Value Date     11/06/2024    K 3.1 11/06/2024     11/06/2024    CO2 26.0 11/06/2024    BUN 36 11/06/2024    CREATSERUM 1.98 11/06/2024     11/06/2024    CA 9.0 11/06/2024     Lab Results   Component Value Date    INR 1.01 10/19/2024    INR 1.0 (L) 08/08/2008    INR 1.0 (L) 12/11/2007          Imaging:      ASSESSMENT/PLAN:  Acute hypoxic resp failure - due to CHF exacerbation superimposed on significant COPD. No clinical signs of infection; has leukocytosis but was on steroids recently.  - weaned to room air  - BD nebs prn  - bronchial hygiene  COPD, no exac  - cont home inhalers- was on Anoro  - BD nebs, as above  ID- no obvious signs of PNA and clinically improving with diuresis  - hold on empiric abx for now  HFrEF- with exacerbation  - ongoing diuresis per cards  Proph - LMWH  Dispo - okay for DC from pulmonary perspective. Follow-up in one week with MD or APN.

## 2024-11-06 NOTE — PAYOR COMM NOTE
--------------  11/6:  CONTINUED STAY REVIEW    Payor: [employer]  Subscriber #:  [id number]  Authorization Number: [id number]    Admit date: 11/5/24  Admit time:  4:26 AM        CARDIOLOGY:    Subjective:  Definitely feels better.  Tolerated IV diuresis, weight is down.  BP stable.          Impression:  Dyspnea - COPD, acute on chronic HFpEF, and age/deconditioning.  Improved with IV diuresis.  CAD with remote CABG, Severe ischemic DCM, EF 15%.  PCI of Lcx OM 10/24/24.  VT with AICD - multiple shocks last admission, now on amiodarone.  COPD with exacerbation.  CRI - stable  Diabetes  H/o hyperkalemia, limiting GDMT.  Loss of taste and appetite, may be due to amio.              Plan:  80 mg IV lasix this am  Change furosemide to torsemide 40 mg qam on dc.  Await BMP.  Same carvedilol.  Pending BMP, add lisnopril 5mg daily on dc.  Enroll in CHF clinic with labs and f/u next week.  Continue home Ptx.  Appreciate pulm involvement.  Has appointment with Duly VICENTA next week.              Alexandru Rogers MD     MEDICATIONS ADMINISTERED IN LAST 1 DAY:  amiodarone (Pacerone) tab 200 mg       Date Action Dose Route User    11/6/2024 0759 Given 200 mg Oral Prehn, Sarah, RN    11/5/2024 1748 Given 200 mg Oral Jake Salcedo RN          aspirin chewable tab 81 mg       Date Action Dose Route User    11/5/2024 2147 Given 81 mg Oral Candace Luna RN          atorvastatin (Lipitor) tab 40 mg       Date Action Dose Route User    11/5/2024 2147 Given 40 mg Oral Candace Luna RN          carvedilol (Coreg) tab 12.5 mg       Date Action Dose Route User    11/6/2024 0759 Given 12.5 mg Oral Prehn, Sarah, RN    11/5/2024 1748 Given 12.5 mg Oral Jake Salcedo RN          clopidogrel (Plavix) tab 75 mg       Date Action Dose Route User    11/6/2024 0759 Given 75 mg Oral Prehn, Sarah, RN          enoxaparin (Lovenox) 30 MG/0.3ML SUBQ injection 30 mg       Date Action Dose Route User    11/6/2024 0759 Given 30 mg  Subcutaneous (Left Lower Abdomen) Prehn, Sarah, RN          furosemide (Lasix) 10 mg/mL injection 80 mg       Date Action Dose Route User    11/5/2024 1626 Given 80 mg Intravenous Jake Salcedo RN          furosemide (Lasix) 10 mg/mL injection 80 mg       Date Action Dose Route User    11/6/2024 0758 Given 80 mg Intravenous Prehn, Sarah, RN          insulin aspart (NovoLOG) 100 Units/mL FlexPen 2-10 Units       Date Action Dose Route User    11/5/2024 2147 Given 2 Units Subcutaneous (Left Lower Abdomen) Candace Luna RN    11/5/2024 1748 Given 2 Units Subcutaneous (Left Upper Arm) Jake Salcedo RN          magnesium oxide (Mag-Ox) tab 400 mg       Date Action Dose Route User    11/6/2024 1019 Given 400 mg Oral Prehn, Sarah, RN          pantoprazole (Protonix) DR tab 40 mg       Date Action Dose Route User    11/6/2024 0501 Given 40 mg Oral Candace Luna RN          potassium chloride (Klor-Con M20) tab 40 mEq       Date Action Dose Route User    11/6/2024 1019 Given 40 mEq Oral Prehn, Sarah, RN          umeclidinium-vilanterol (Anoro Ellipta) 62.5-25 MCG/ACT inhaler 1 puff       Date Action Dose Route User    11/6/2024 0758 Given 1 puff Inhalation Prehn, Sarah, RN            Vitals (last day)       Date/Time Temp Pulse Resp BP SpO2 Weight O2 Device O2 Flow Rate (L/min) [facility]    11/06/24 1100 -- 62 -- -- 97 % -- -- -- KR    11/06/24 0830 97.5 °F (36.4 °C) 68 22 105/51 99 % -- None (Room air) -- KR    11/06/24 0500 97.5 °F (36.4 °C) 69 28 99/52 98 % 125 lb 11.2 oz (57 kg) None (Room air) -- BK    11/05/24 2330 97.7 °F (36.5 °C) 68 27 93/52 95 % -- None (Room air) -- BK    11/05/24 2000 98.1 °F (36.7 °C) 71 19 95/50 95 % -- None (Room air) --     11/05/24 1645 97.5 °F (36.4 °C) 76 23 106/58 95 % -- None (Room air) --     11/05/24 1200 98 °F (36.7 °C) 61 25 101/62 94 % -- None (Room air) --     11/05/24 1030 -- 69 18 -- 95 % -- -- --     11/05/24 0927 -- -- -- -- 93 % -- None (Room air) --      11/05/24 0845 97.3 °F (36.3 °C) 75 26 106/51 95 % 132 lb 11.2 oz (60.2 kg) None (Room air) 0 L/min JM    11/05/24 0800 -- -- -- -- 100 % -- Nasal cannula 2 L/min JH    11/05/24 0700 -- -- -- -- -- -- Nasal cannula 5 L/min WS    11/05/24 0430 97.6 °F (36.4 °C) 78 20 114/79 100 % 132 lb 9.6 oz (60.1 kg) Nasal cannula 5 L/min     11/05/24 0349 -- 68 33 106/55 100 % -- Nasal cannula 4 L/min SC    11/05/24 0151 -- 74 32 110/61 100 % -- Nasal cannula 4 L/min SC    11/05/24 0052 -- -- -- -- -- -- Nasal cannula 4 L/min SC

## 2024-11-06 NOTE — PROGRESS NOTES
Cardiology Progress Note        Mingo White Patient Status:  Inpatient    1945 MRN [id number]   [facility] 0SW-A Attending Henrry Lantigua MD   Hosp Day # 1 PCP Daren Espino MD     Subjective:  Definitely feels better.  Tolerated IV diuresis, weight is down.  BP stable.    Medications:   amiodarone  200 mg Oral BID with meals    aspirin  81 mg Oral Nightly    atorvastatin  40 mg Oral Nightly    carvedilol  12.5 mg Oral BID with meals    clopidogrel  75 mg Oral Daily    pantoprazole  40 mg Oral QAM AC    umeclidinium-vilanterol  1 puff Inhalation Daily    enoxaparin  30 mg Subcutaneous Daily    furosemide  80 mg Intravenous BID (Diuretic)    insulin aspart  2-10 Units Subcutaneous TID AC and HS       Continuous Infusions:        Allergies:  Allergies[1]      Intake/Output:    Intake/Output Summary (Last 24 hours) at 2024 0702  Last data filed at 2024 0000  Gross per 24 hour   Intake --   Output 1540 ml   Net -1540 ml           Last 3 Weights   24 0845 132 lb 11.2 oz (60.2 kg)   24 0430 132 lb 9.6 oz (60.1 kg)   24 2315 130 lb (59 kg)   10/28/24 0454 134 lb 11.2 oz (61.1 kg)   10/27/24 0140 135 lb 12.9 oz (61.6 kg)   10/23/24 0500 136 lb 3.9 oz (61.8 kg)   10/20/24 1208 106 lb 11.2 oz (48.4 kg)   10/20/24 0130 106 lb 11.2 oz (48.4 kg)   10/19/24 2239 112 lb 7 oz (51 kg)   11/15/23 0532 113 lb 1.6 oz (51.3 kg)   23 0426 115 lb 4.8 oz (52.3 kg)   23 2346 122 lb (55.3 kg)            Physical Exam:    Temp:  [97.3 °F (36.3 °C)-98.1 °F (36.7 °C)] 97.7 °F (36.5 °C)  Pulse:  [61-76] 68  Resp:  [18-27] 27  BP: ()/(50-62) 93/52  SpO2:  [93 %-100 %] 95 %    General: No apparent distress  HEENT: No focal deficits.  Neck: supple. JVP normal  Cardiac: Regular rhythm, S1, S2 normal,  rub or gallop.  No murmur.  Lungs: CTA  Abdomen: Soft, non-tender.   Extremities: No edema  Neurologic: no focal deficits  Skin: Warm and dry.     Telemetry:      EKG:      Echo:      Cardiac Cath:      Labs:  HEM:  Recent Labs   Lab 11/04/24 2319   WBC 19.0*   HGB 10.2*   .0*       Chem:  Recent Labs   Lab 11/04/24 2319   *   K 4.0      CO2 24.0   BUN 37*   CREATSERUM 2.05*   CA 8.7   *       Recent Labs   Lab 11/04/24 2319   ALT 19   AST 21   ALB 3.4       No results for input(s): \"TROP\", \"CK\" in the last 168 hours.    No results for input(s): \"PTP\", \"INR\" in the last 168 hours.              Impression:  Dyspnea - COPD, acute on chronic HFpEF, and age/deconditioning.  Improved with IV diuresis.  CAD with remote CABG, Severe ischemic DCM, EF 15%.  PCI of Lcx OM 10/24/24.  VT with AICD - multiple shocks last admission, now on amiodarone.  COPD with exacerbation.  CRI - stable  Diabetes  H/o hyperkalemia, limiting GDMT.  Loss of taste and appetite, may be due to amio.          Plan:  80 mg IV lasix this am, consider dc later today.  Change furosemide to torsemide 40 mg qam on dc.  Await BMP.  Same carvedilol.  Pending BMP, add lisnopril 5mg daily on dc.  Enroll in CHF clinic with labs and f/u next week.  Continue home Ptx.  Appreciate pulm involvement.  Has appointment with [facility] EP next week.          Alexandru Rogers MD  11/6/2024  7:02 AM  L3         [1]   Allergies  Allergen Reactions    Niaspan [Niacin, Antihyperlipidemic] ITCHING and FACE FLUSHING    Heparin UNKNOWN     States possible allergy to heparin. Blood sugar elevated when he was given heparin

## 2024-11-06 NOTE — CONSULTS
[facility]   part of [facility]  Palliative Care Initial Consult Note    Mingo White Patient Status:  Inpatient    1945 MRN [id number]   Location [facility] 0SW-A Attending Henrry Lantigua MD   Hosp Day # 1 PCP Daren Espino MD     Date of Consult: 2024  Patient seen at: [facility] Inpatient    Reason for Consultation: Dr. Jose requested a consult for goals of care.       Subjective     History of Present Illness: Mingo White is a 79 year old male with a history of severe ischemic cardiomyopathy with an EF of 15%, chronic systolic heart failure, history of V. tach status post AICD, CAD status post CABG, type 2 diabetes, AAA status post EVAR, COPD, angiogram 10/24 post PCI to  . Discharged from hospital on 10/28/24 following an 8 day admission. Finding from this admission 2024 include leukocytosis, hemoglobin 10.2, creatinine 2.05, proBNP elevated, CXR with pulmonary vascular congestion and lateral patchy opacities.  Diuretics given with good symptom response, plan to follow up in HF clinic.        History was obtained from Shaina and Mingo.   Today is day #1 of hospitalization.   This is the 2nd hospitalization in the past 6 months.    When I entered the room, the patient was awake and will to discuss and learn about palliative care. His wife joined the discussion as well.    Review of Systems:   Dyspnea: yes with exertion   Cough: intermittent  Nausea: denies  Appetite: fair       Bowel Movement    No data found in the last 1 encounters.       Wt Readings from Last 6 Encounters:   24 125 lb 11.2 oz (57 kg)   10/28/24 134 lb 11.2 oz (61.1 kg)   11/15/23 113 lb 1.6 oz (51.3 kg)   22 124 lb (56.2 kg)   21 124 lb (56.2 kg)   21 127 lb (57.6 kg)        Palliative Care Social History:   Marital Status:   Children: Yes son and daughter in the area.   Living Situation Prior to Admit: home with his wife. She is the main caregiver.   Is Patient  Confused: No  Occupational History: Retired    Substance History:   reports that he has been smoking cigarettes. He has a 25 pack-year smoking history. He has never used smokeless tobacco.  reports no history of alcohol use.  reports no history of drug use.      Spiritual Assessment:   [place] - [place] Not Listed      Past Medical History/Past Surgical History:       Medical History: obtained from [facility]  Past Medical History:    Anesthesia complication    was \"out of it\" for a long time after sedation with previous colonoscopies    Back problem    Carotid artery disease (HCC)    Congestive heart disease (HCC)    CORONARY ARTERY DISEASE    Coronary atherosclerosis    DIABETES    Diabetes (HCC)    Gastric ulcer    HEART ATTACK    age 61    High blood pressure    HYPERLIPIDEMIA    Sinus drainage    with weather changes     Past Surgical History:   Procedure Laterality Date    Cardiac defibrillator placement  2007    Medtronic dual chamber ICD, not pacemaker dependent    Colonoscopy  4/6/11 - DANNIE Espinoza    adenoma, fair prep, hemorrhoids, repeat 2013-14.    Colonoscopy  4/10/2014    DANNIE Espinoza. 6 adenomas, hemorrhoids, repeat 2017 w/ split 4L prep.     Colonoscopy  07/24/2017    Diverticulosis, Hemorrhoids.  Repeat PRN    Colonoscopy N/A 7/24/2017    Procedure: COLONOSCOPY;  Surgeon: Brad Tapia MD;  Location:  ENDOSCOPY    Open heart surgery (pbp)      Double bypass - done in [facility]    Other      repair AAA    Upper gi endoscopy performed  4/6/11 - DANNIE Espinoza    small , no H pylori.        Family History: obtained from [facility]  Family History   Problem Relation Age of Onset    Heart Disorder Father     Heart Disorder Mother     Diabetes Maternal Grandmother        Allergies:  Allergies[1]    Medications:     Current Facility-Administered Medications:     [START ON 11/7/2024] torsemide (Demadex) tab 40 mg, 40 mg, Oral, Daily    amiodarone (Pacerone) tab 200 mg, 200 mg, Oral, BID with meals    aspirin chewable tab 81 mg, 81  mg, Oral, Nightly    atorvastatin (Lipitor) tab 40 mg, 40 mg, Oral, Nightly    carvedilol (Coreg) tab 12.5 mg, 12.5 mg, Oral, BID with meals    clopidogrel (Plavix) tab 75 mg, 75 mg, Oral, Daily    pantoprazole (Protonix) DR tab 40 mg, 40 mg, Oral, QAM AC    umeclidinium-vilanterol (Anoro Ellipta) 62.5-25 MCG/ACT inhaler 1 puff, 1 puff, Inhalation, Daily    enoxaparin (Lovenox) 30 MG/0.3ML SUBQ injection 30 mg, 30 mg, Subcutaneous, Daily    acetaminophen (Tylenol Extra Strength) tab 500 mg, 500 mg, Oral, Q4H PRN    polyethylene glycol (PEG 3350) (Miralax) 17 g oral packet 17 g, 17 g, Oral, Daily PRN    sennosides (Senokot) tab 17.2 mg, 17.2 mg, Oral, Nightly PRN    bisacodyl (Dulcolax) 10 MG rectal suppository 10 mg, 10 mg, Rectal, Daily PRN    metoclopramide (Reglan) 5 mg/mL injection 5 mg, 5 mg, Intravenous, Q8H PRN    glucose (Dex4) 15 GM/59ML oral liquid 15 g, 15 g, Oral, Q15 Min PRN **OR** glucose (Glutose) 40% oral gel 15 g, 15 g, Oral, Q15 Min PRN **OR** glucose-vitamin C (Dex-4) chewable tab 4 tablet, 4 tablet, Oral, Q15 Min PRN **OR** dextrose 50% injection 50 mL, 50 mL, Intravenous, Q15 Min PRN **OR** glucose (Dex4) 15 GM/59ML oral liquid 30 g, 30 g, Oral, Q15 Min PRN **OR** glucose (Glutose) 40% oral gel 30 g, 30 g, Oral, Q15 Min PRN **OR** glucose-vitamin C (Dex-4) chewable tab 8 tablet, 8 tablet, Oral, Q15 Min PRN    insulin aspart (NovoLOG) 100 Units/mL FlexPen 2-10 Units, 2-10 Units, Subcutaneous, TID AC and HS    Functional Status History:  ADLs: bathing or showering, dressing, getting in and out of bed or a chair, walking, using the toilet, and eating  - MODERATE  3 - 5 performance deficits. Prior to the last two weeks he was able to do self care independently. Most recently he has required assist from his wife.   IADLs: use the phone, shop for groceries, meal preparation, manage medicines, clean living area, use transportation by self, manage money  - HIGH  5+ performance deficits . He had been  driving prior to two weeks ago. When they go the grocery store he sits in the store and waits for his wife. He can not walk the isles.   DME: Walker  Recurrent falls: No    Palliative Performance Scale:     (pt/family reported) 60%    Current: 60%  % Ambulation Activity Level Self-Care Intake Consciousness   100 Full  Normal  No Disease Full Normal Full   90 Full  Normal  Some Disease Full Normal Full   80 Full  Normal w/effort  Some Disease Full Normal or reduced Full   70 Reduced  Can't Perform Job  Some Disease Full Normal or reduced Full   60 Reduced  Can't Perform Hobby   Significant Disease Occ Assist Normal or reduced Full or confused   50 Mainly sit/lie Can't do any work  Extensive Disease Partial Assist Normal or reduced Full or confused   40 Mainly in bed Can't do any work  Extensive Disease Mainly Assist Normal or reduced Full or confused   30 Bed Bound Can't do any work  Extensive Disease Max Assist  Total Care Reduced  Drowsy/confused   20 Bed Bound Can't do any work  Extensive Disease Max Assist  Total Care Minimal  Drowsy/confused   10 Bed Bound Can't do any work  Extensive Disease Max Assist  Total Care Mouth Care  Drowsy/confused   0 Death        Objective      Vital Signs:  Blood pressure 99/52, pulse 69, temperature 97.5 °F (36.4 °C), temperature source Oral, resp. rate (!) 28, height 5' 7\" (1.702 m), weight 125 lb 11.2 oz (57 kg), SpO2 98%.  Body mass index is 19.69 kg/m².      Physical Exam:  General: Alert & Awake. In no apparent respiratory distress. Body habitus Thin . Bitemporal wasting.   HEENT:  No gross focal deficits . MMM   Cardiac: paced with AICD.  Lungs: diminished  Abdomen: soft  Extremities: no LE edema present  Neurologic: Alert and oriented to person, place, time, situation.   Psychiatric: Mood pleasant mood   Skin: Warm and dry.    Hematology:  Lab Results   Component Value Date    WBC 19.0 (H) 11/04/2024    HGB 10.2 (L) 11/04/2024    HCT 31.1 (L) 11/04/2024    .0 (L)  11/04/2024       Coags:  Lab Results   Component Value Date    INR 1.01 10/19/2024    PTT 48.9 (H) 10/19/2024       Chemistry:  Lab Results   Component Value Date    CREATSERUM 2.05 (H) 11/04/2024    BUN 37 (H) 11/04/2024     (L) 11/04/2024    K 4.0 11/04/2024     11/04/2024    CO2 24.0 11/04/2024     (H) 11/04/2024    CA 8.7 11/04/2024    ALB 3.4 11/04/2024    ALKPHO 74 11/04/2024    BILT 0.7 11/04/2024    TP 6.2 11/04/2024    AST 21 11/04/2024    ALT 19 11/04/2024    PSA 3.18 03/14/2022    MG 2.6 10/28/2024    PHOS 3.9 10/28/2024       Imaging:  XR CHEST AP PORTABLE  (CPT=71045)    Result Date: 11/4/2024  CONCLUSION:  1. Mild pulmonary vascular congestion and patchy bilateral ground-glass type parahilar opacities, suspicious for mild pulmonary edema/early congestive failure. 2. Minimal blunting of the costophrenic angles bilaterally suspicious for atelectasis versus mild effusions. 3. Stable cardiomegaly.   LOCATION:  [place]      Dictated by (CST): Dasha Rees DO on 11/04/2024 at 11:53 PM     Finalized by (CST): Dasha Rees DO on 11/04/2024 at 11:54 PM        Summary of Discussion      I discussed reason for palliative care consultation with Mingo and his wife Babita.    I differentiated the palliative treatment-focus model versus the hospice comfort-focused philosophy of care. I informed the patient/family that having palliative care support does not limit medical treatment options or decisions to those who wish to continue curative or restorative medical therapies. I discussed the benefits of palliative care to include assistance with arising symptom management needs, an extra layer of support, to ensure GOC are respected throughout healthcare continuum, and assist with transition to hospice care when appropriate.      Outpatient/Community Palliative Care Services:  Usually visit once per 4 weeks  Focus on GOC and symptom management   Palliative Care criteria:  Not altered by prognosis    Does not limit curative or restorative therapies        Prognostic awareness/understanding from Mingo and his wife is insightful.   Mingo is able to recall his history and verbalize how he feels when he experiences symptoms. He needs ongoing education related to daily lifestyle to prevent excer of heart failure. They are doing their best to manage at home. iMngo acknowledges over the past two weeks his endurance has decreased and he has required more help at home. He hopes P.T. will help as well as medication management. He verbalized his willingness to compliance with medications/ diuretics. We discussed strategies to assist with daily weights.       Hopes/goals:   Mingo hopes to stay out of the hospital and not have recurrent episodes of SOB.     Provided emotional support to the patient and wife.    Advance Care Planning counseling and discussion:   They voluntarily participated in a advanced care planning discussion. We discussed the risks vs benefits of life sustaining treatments in the setting of CHF, COPD advanced age and co-morbidities.     Section A:  A full code includes aggressive life saving measures with possible CPR, defibrillation/ shocking, intubation and placement on the ventilator with administration of emergent IV medications to assist in restoring circulation.    vs  DNR ( do not resuscitate) indicates if the heart stops and no spontaneous breaths end of life is present, \" allow natural death.\"    Section B:   DNAR with Full treatment includes ongoing medical management and treatment for new potential symptoms or complications including intubation for respiratory distress,  the exception is NO CPR.     DNAR with Selective treatment is appropriate for on going medical management and treatment for new potential symptoms or complications with the exception of CPR AND INTUBATION.  DNI ( do not intubate) indicates no breathing tube will be placed for respiratory distress or if no spontaneous  breaths.    DNAR with Comfort care is appropriate when further readmissions, aggressive treatment and evaluation of potential new symptoms do not benefit the patient nor provide quality of life. Consideration for hospice support when comfort care is appropriate.    Mingo stated \"we have talked about resuscitation when we did our valladares.\" I explained the difference between the  version and the hospital form. We discussed the POLST is the universal communication to the medical team, providers, caregivers and EMS to honor his wishes. They acknowledged. They were informed to place the POLST at home on the refrigerator.   Mingo and his wife agree he does not want intubation nor CPR. AT this time the defibrillator is appropriate, although Mingo shared how painful it was when he was shocked.    Mingo will communicate with his children his wishes for DNAR with selective treatment.      POA : surrogate : Wife Babita White [phone]  POLST FORM Completed--Document signed and will be scanned. 3 copies provided to the patient and wife.       Principal Problem:    Acute congestive heart failure, unspecified heart failure type (HCC)  Active Problems:    Hypoxia    Leukocytosis, unspecified type    Anemia, unspecified type    Elevated troponin  Palliative Care encounter    Assessment and Recommendations      Goals of care:   Community palliative care   Code Status: DNAR with selective treatment.   Healthcare Agent's Name: Babita      Discussed today's visit with Simin BURROWS and Dr. Polo    Palliative Care Follow Up: Palliative care team will sign off at this time. Feel free to contact our team with any questions or concerns. Patient plans to discharge later today.   Palliative care follow up outpatient: is indicated and community palliative care ordered.    Thank you for allowing Palliative Care services to participate in the care of Mingo White.    A total of 75 minutes were spent on this consult, which included all  of the following: chart review, direct face to face contact, history taking, physical examination, advanced care planning,  counseling and coordinating care, and documentation.     KARINA Hall  11/6/2024  8:04 AM  Palliative Care Services    The 21st Century Cures Act makes medical notes like these available to patients in the interest of transparency. Please be advised this is a medical document. Medical documents are intended to carry relevant information, facts as evident, and the clinical opinion of the practitioner. The medical note is intended as peer to peer communication and may appear blunt or direct. It is written in medical language and may contain abbreviations or verbiage that are unfamiliar.          [1]   Allergies  Allergen Reactions    Niaspan [Niacin, Antihyperlipidemic] ITCHING and FACE FLUSHING    Heparin UNKNOWN     States possible allergy to heparin. Blood sugar elevated when he was given heparin

## 2024-11-06 NOTE — DIETARY NOTE
[facility]   part of [facility]   CLINICAL NUTRITION    Mingo HIDALGO [facility]     Admitting diagnosis:  Hypoxia [R09.02]  Elevated troponin [R79.89]  Anemia, unspecified type [D64.9]  Leukocytosis, unspecified type [D72.829]  Acute congestive heart failure, unspecified heart failure type (HCC) [I50.9]    Ht: 170.2 cm (5' 7\")  Wt: 57 kg (125 lb 11.2 oz).   Body mass index is 19.69 kg/m².  IBW: 67 kg    Wt Readings from Last 6 Encounters:   11/06/24 57 kg (125 lb 11.2 oz)   10/28/24 61.1 kg (134 lb 11.2 oz)   11/15/23 51.3 kg (113 lb 1.6 oz)   03/21/22 56.2 kg (124 lb)   11/22/21 56.2 kg (124 lb)   09/01/21 57.6 kg (127 lb)        Labs/Meds reviewed    Diet:       Procedures    Cardiac diet Sodium Restriction: 2 GM NA; Fluid Restriction: 2000 ml; Is Patient on Accuchecks? Yes; Cardiac     Percent Meals Eaten (last 3 days)       None              Pt chart reviewed d/t HF education. 80 y/o male with PMH T2DM, CAD, HTN, HLD, s/p stent and pacemaker presents with SOB. Pt visited, wife at bedside. Pt given handout on ways to reduce sodium in diet. Discussed importance of fluid restriction to help minimize fluid retention. Pt familiar with cardiac/low sodium diet d/t history of heart attacks. Referred to HF handouts for more information. Answered all questions at this time. Will follow per protocol.    Patient reports good appetite at this time.  Nursing notes reports   intake for last meal.  Tolerating po diet without diarrhea, emesis, or constipation.   No significant weight changes noted.     Patient is at low nutrition risk at this time.    Please consult if patient status changes or nutrition issues arise.      Arlene Murguia, MS, RDN, LDN  Clinical Dietitian

## 2024-11-06 NOTE — PLAN OF CARE
HF addedum/ Plan of care.     Acute on Chronic HFrEF-EF 15%  H/o low bp on Entresto, per office notes was taking Lisinopril 5mg prior to admission, not listed on home med rec here.   Consider addition of SGLT-2, aldactone outpatient  HF orderset initiated.   Left renal artery stenosis     Addendum: confirmed patient was taking lisinopril 5mg prior to admission, will resume.   Jaquelin DUPREEN

## 2024-11-06 NOTE — PROGRESS NOTES
Met with patient to give informational folder on Heart Failure Clinic ([facility]). Patient aware that he will be going, and patient informed appointment information will be on discharge paperwork. Patient also informed that he has a new appointment with Dr Rogers and information will be on discharge paperwork. Patient had no further questions.     Jovana Courtney, RN  HF   [phone]

## 2024-11-07 NOTE — PAYOR COMM NOTE
--------------  DISCHARGE REVIEW    Payor: [employer]  Subscriber #:  [id number]  Authorization Number: [id number]    Admit date: 11/5/24  Admit time:   4:26 AM  Discharge Date: 11/6/2024  3:11 PM     Admitting Physician: Henrry Lantigua MD  Attending Physician:  No att. providers found  Primary Care Physician: Daren Espino MD       Discharge Summary Notes    No notes of this type exist for this encounter.         REVIEWER COMMENTS

## 2024-11-09 NOTE — DISCHARGE SUMMARY
[facility] Hospitalist Discharge Summary     Patient ID:  Mingo White  79 year old  6/5/1945    Admit date: 11/4/2024  Discharge date and time: 11/6/2024  3:11 PM   Attending Physician: No att. providers found   Primary Care Physician: Daren Espino MD   Discharge Diagnoses: Hypoxia [R09.02]  Elevated troponin [R79.89]  Anemia, unspecified type [D64.9]  Leukocytosis, unspecified type [D72.829]  Acute congestive heart failure, unspecified heart failure type (HCC) [I50.9]    Discharge Condition: Stable    Disposition:  Home    Follow up:   - PCP within 1 week  - Consults: cardiology    Hospital Course:  79-year-old male significant past medical history of severe ischemic cardiomyopathy with an EF of 15%, chronic systolic heart failure, history of V. tach status post AICD, CAD status post CABG, type 2 diabetes, AAA status post EVAR, COPD, recently discharged from the hospital last week presents again with dyspnea with minimal exertion.  His last admission was from 10/20/2024 to 10/28/2024, during which time he had ventricular tachycardia with appropriate shocks however unable to upgrade to CRT given anatomy, also underwent angiogram status post PCI to the OM, patient was medically cleared to be discharged only to return a week later with significant dyspnea with exertion, he does not wear any oxygen at home,     In the emergency room his vital signs were stable he was tachypneic at 27, was saturating 99% on 4 L of oxygen, 1+ pitting edema bilaterally, did have a leukocytosis of 19, hemoglobin of 10.2 creatinine was 2.05 proBNP was elevated at 30,000 699 troponin was slightly elevated at 68, COVID was negative, chest x-ray just showed mild pulmonary vascular congestion and by lateral patchy opacities    # Acute hypoxic respiratory failure likely secondary to  # Acute on chronic systolic heart failure exacerbation  # Elevated troponin  -Dyspnea with exertion, currently weaned off to 4 L of oxygen  -Most recent echo done last  admission was on 10/21/2024 that actually shows an EF of 15 to 20%,  -Cardiology has been consulted, elevated proBNP, IV diuresis with Lasix 80 IV twice daily  -Monitor strict I's and O's, daily weights, no evidence of COPD exacerbation at this time, elevated troponin is likely demand ischemia     # History of V. tach status post AICD  -Continues to be in sinus rhythm, continue amiodarone  -Cardiology has been consulted, attempt to upgrade to CRT was unsuccessful last admission because of anatomy  -Continue telemetry monitor     # CAD status post CABG and recent PCI  -Recent PCI last admission in October, continue aspirin, Plavix, statin, beta-blocker     # COPD  -Continue Incruse does not appear to be COPD exacerbation at this time, DuoNebs as needed        # Type 2 diabetes  -Sliding scale insulin     # History of AAA status post EVAR    Exam on Day of DC:  /56 (BP Location: Right arm)   Pulse 60   Temp 97.9 °F (36.6 °C) (Oral)   Resp 20   Ht 5' 7\" (1.702 m)   Wt 125 lb (56.7 kg)   SpO2 100%   BMI 19.58 kg/m²   Physical Exam:  Gen: No acute distress, alert and oriented x 3  Pulm: Lungs clear bilaterally, good inspiratory effort poor respiratory effort,  CV:  nL S1/S2  Abd: soft, NT/ND, no hepatomegaly, +BS  MSK: moving all extremities, 1+ pitting edema  Neuro: no focal deficits  Skin: no rashes/lesions  Psych: normal mood/affect      I as the attending physician reconciled the current and discharge medications on day of discharge.     Discharge Medication List as of 11/6/2024  1:39 PM        START taking these medications    Details   torsemide 40 MG Oral Tab Take 40 mg by mouth daily., Normal, Disp-30 tablet, R-0           CONTINUE these medications which have CHANGED    Details   lisinopril 5 MG Oral Tab Take 1 tablet (5 mg total) by mouth daily., Normal, Disp-30 tablet, R-0           CONTINUE these medications which have NOT CHANGED    Details   acetaminophen 325 MG Oral Tab Take 2 tablets (650 mg  total) by mouth every 6 (six) hours as needed for Pain or Fever., OTC, Disp-30 tablet, R-0      insulin aspart (NOVOLOG FLEXPEN) 100 Units/mL Subcutaneous Solution Pen-injector Test blood glucose 3 times daily with meals Inject 1 unit if blood glucose is between 141-180 mg/dL Inject 2 units if blood glucose is between 181-220 mg/dL Inject 3 units if blood glucose is between 221-260 mg/dL Inject 4 units if blood glucose is betwe en 261-300 mg/dL Inject 5 units if blood glucose is between 301-350 mg/dL Call your physician if blood glucose is greater than 351 mg/dL,, Normal, Disp-5 each, R-3May substitute if not on insurance formulary      Blood Glucose Monitoring Suppl (ONETOUCH VERIO FLEX SYSTEM) w/Device Does not apply Kit Test blood sugar 3 times per day., Normal, Disp-1 kit, R-0May substitute with patient's insurance's preferred branded formulary meter      !! OneTouch Delica Lancets 33G Does not apply Misc Test blood sugar 3 times per day., Normal, Disp-100 each, R-6May substitute if not on insurance formulary.      pantoprazole 40 MG Oral Tab EC Take 1 tablet (40 mg total) by mouth every morning before breakfast., Normal, Disp-90 tablet, R-0      atorvastatin 40 MG Oral Tab Take 1 tablet (40 mg total) by mouth nightly., Normal, Disp-30 tablet, R-1      umeclidinium-vilanterol 62.5-25 MCG/ACT Inhalation Aerosol Powder, Breath Activated Inhale 1 puff into the lungs daily., Normal, Disp-1 each, R-0      amiodarone 200 MG Oral Tab Take 1 tablet (200 mg total) by mouth 2 (two) times daily with meals., Normal, Disp-90 tablet, R-0      !! Glucose Blood (PRODIGY NO CODING BLOOD GLUC) In Vitro Strip Use as directed to test blood sugar once daily, Normal, Disp-100 strip, R-3Dx: E11.9      !! Glucose Blood (ONETOUCH VERIO) In Vitro Strip Test blood sugar 3 times per day., Normal, Disp-100 strip, R-6May substitute if not on insurance formulary      albuterol 108 (90 Base) MCG/ACT Inhalation Aero Soln Inhale 2 puffs into the  lungs every 4 (four) hours as needed for Wheezing., Normal, Disp-1 each, R-0      !! Prodigy Lancets 28G Does not apply Misc Test once daily, Normal, Disp-100 each, R-3Dx: E11.9      Lancet Device Does not apply Misc MEASURE HOME GLUCOSE DAILY --DIRECTED, Normal, Disp-1 each, R-11      carvedilol 12.5 MG Oral Tab Take 1 tablet (12.5 mg total) by mouth 2 (two) times daily., Normal, Disp-180 tablet, R-3      CLOPIDOGREL BISULFATE 75 MG Oral Tab TAKE 1 TABLET DAILY, Normal, Disp-90 tablet, R-3      ASPIRIN 81 MG OR TABS Take 1 tablet (81 mg total) by mouth at bedtime., Historical       !! - Potential duplicate medications found. Please discuss with provider.        STOP taking these medications       furosemide 40 MG Oral Tab              Magdalena Saldivar [facility] Hospitalist

## 2024-11-12 NOTE — PROGRESS NOTES
[facility]  Heart Failure Clinic Progress Note    Mingo White is a 79 year old male who presents to clinic at the request of Dr. Rogers for APN assessment and management of chronic systolic heart failure and is functional class 3b.  Patient has a history of:    Past Medical History:    Anesthesia complication    was \"out of it\" for a long time after sedation with previous colonoscopies    Back problem    Carotid artery disease (HCC)    Congestive heart disease (HCC)    CORONARY ARTERY DISEASE    Coronary atherosclerosis    DIABETES    Diabetes (HCC)    Gastric ulcer    HEART ATTACK    age 61    High blood pressure    HYPERLIPIDEMIA    Sinus drainage    with weather changes      Past Surgical History:   Procedure Laterality Date    Cardiac defibrillator placement  2007    Medtronic dual chamber ICD, not pacemaker dependent    Colonoscopy  4/6/11 - DANNIE Espinoza    adenoma, fair prep, hemorrhoids, repeat 2013-14.    Colonoscopy  4/10/2014    DANNIE Espinoza. 6 adenomas, hemorrhoids, repeat 2017 w/ split 4L prep.     Colonoscopy  07/24/2017    Diverticulosis, Hemorrhoids.  Repeat PRN    Colonoscopy N/A 7/24/2017    Procedure: COLONOSCOPY;  Surgeon: Brad Tapia MD;  Location:  ENDOSCOPY    Open heart surgery (pbp)      Double bypass - done in [facility]    Other      repair AAA    Upper gi endoscopy performed  4/6/11 - DANNIE Espinoza    small , no H pylori.       Family History   Problem Relation Age of Onset    Heart Disorder Father     Heart Disorder Mother     Diabetes Maternal Grandmother       Social History     Socioeconomic History    Marital status:    Occupational History    Occupation: chemical technologist     Comment: retire age 54   Tobacco Use    Smoking status: Every Day     Current packs/day: 0.50     Average packs/day: 0.5 packs/day for 50.0 years (25.0 ttl pk-yrs)     Types: Cigarettes    Smokeless tobacco: Never   Vaping Use    Vaping status: Never Used   Substance and Sexual Activity    Alcohol  use: No     Alcohol/week: 0.0 standard drinks of alcohol     Comment: quit 2006    Drug use: No   Other Topics Concern     Service Yes     Comment: [employer] - [employer] exposure    Blood Transfusions No    Caffeine Concern No    Occupational Exposure No    Hobby Hazards No    Sleep Concern No    Stress Concern No    Weight Concern No    Special Diet Yes    Back Care Yes    Exercise Yes    Seat Belt Yes   Social History Narrative    Likes to do crossword puzzles    Active with grand kids and their sports    Socializes with friends     Social Drivers of Health     Food Insecurity: No Food Insecurity (11/5/2024)    Food Insecurity     Food Insecurity: Never true   Transportation Needs: No Transportation Needs (11/5/2024)    Transportation Needs     Lack of Transportation: No   Housing Stability: Low Risk  (11/5/2024)    Housing Stability     Housing Instability: No        Subjective:  Mingo arrives for first clinic visit after multiple hospitalizations. He was hospitalized 10/19-10/28 after multiple ICD shocks. Prior to the hospital stay his treatment threshold as reduced due to NSVT occurring below the threshold for therapy. He was started on Amiodarone with loading dose. Noted to be hyperkalemic (given Lokelma) on arrival with worsening renal function in the setting of increased diuretics recently for decompensation. Stay c/b acute respiratory failure prompting pulmonary consult and treatment with IV steroids and Bipap. Seen by Nephrology and gently hydrated. ACE-I stopped. IV iron recommended but not given.  Underwent generator change. Attempt at upgrade to CRT-D unsuccessful. Underwent angiogram and PCI/NORRIS to OM. Echo with LVEF 15-20%, Grade I DD, low normal RV function, mild to MR, Discharged at 134 lbs. To follow up with Nephrology and Pulmonary after discharge.     He was back in the ED and admitted from 11/4-11/6 with dyspnea. ProBNP was 30,000 on arrival and discharge. CXR with mild pulmonary  vascular congestion. Seen by pulmonary; no clinical signs of infection. Leukocytosis felt to be related to recent steroids. Symptoms improved with diuresis. Lisinopril resumed. Seen by Palliative Care to discuss goals of care; code status updated. ICD felt to still be appropriate even though he doesn't want CPR. Referred to outpatient Residential Palliative. Discharged at 126 lbs.     He carries history of HFrEF, ICM, CAD s/p CABG 2006, VT s/p Medtronic ICD, tobacco abuse, AAA s/p EVAR 2008 with small endoleak (follows with Dr. White), DM Type II, CKD (follows with Dr. Adorno), HDL, hyperkalemia limiting GDMT, left renal artery stenosis, bilateral moderate carotid artery disease and COPD.     Since discharge he saw Dr. Clemons 11/11; no changes made. 1 month follow up recommended with PFT.    He saw Dr. Pedroza today; risk of amiodarone discussed. TSH ordered. To see him in 6 months.     He presents with his wife for evaluation. He has been doing okay. His legs remain weak and his appetite is poor. Weight on his scale was down to 121 lbs today from a reported weight of ~ 130 lbs when he got home. He monitors BP's that have been in the 90's and stable for him. He denies orthopnea, leg swelling, abdominal bloating, early satiety or palpitations.    Review of Systems  Complete review of systems performed and negative except for the above.      Objective:  BP 96/32   Wt 124 lb 3.2 oz (56.3 kg)   SpO2 100%   BMI 19.45 kg/m²     Telemetry: V-paced     Exam:   General:         Alert, no apparent distress  HEENT:           No JVD  Lungs:            diminished throughout lung fields   CV:                 S1,S2, regular, 1/6 systolic murmur   Abdomen:      Non-distended, soft, non-tender, BS+  Extremities:    no LE edema  Skin:               Pink, warm, dry   Neurological:  A&O x 3; MONSIVAIS    Cardiographics:  10/24 Coronary Angiography  RCA:  100% occluded at the proximal segment. This is a chronic total occlusion.    Left main:  Large sized vessel, No significant CAD  Left anterior descending:  There is a 100% occlusion of the proximal LAD. This is a   Circumflex:  Large sized vessel. There is a 85% stenosis of the ostial OM1 at its proximal segment. This is culprit for patient presentation of VT. Patient underwent intervention with placement of NORRIS. Excellent result. TRUDY 3 flow at completion. 0% residual stenosis  LIMA to LAD: Large size. Patent  SVG to RCA: Large size. Patent     Intervention: OM1  Lesion Characteristics- Mild torturous, Mild calcified.  Type non-C lesion.  Pre-intervention stenosis 85%, Post intervention stenosis 0%.  Pre TRUDY 3, Post TRUDY 3.       Guide Catheter: EBU 3.5  Wire: Run through guidewire in OM, Mode Blue in mid circumflex  Pre-dil:  2.5 x 15 mm compliant balloon  Stent: 2.5 x 24 mm NORRIS  Post-dil:  2.5 x 15 mm NC   IVUS imaging showed appropriate stent apposition. There was no stent edge dissection     Left upper extremity venogram:  Left upper extremity venogram was performed. Showed patent axillary and subclavian veins with mild to moderate stenosis and collateral flow.    ECHOCARDIOGRAM 10/21/2024:  1. Left ventricle: The cavity size was mildly to moderately increased.      Systolic function was markedly reduced. The estimated ejection fraction      was 15-20%, by visual assessment. There was severe diffuse hypokinesis      with regional variations. There was no evidence of a thrombus revealed by      acoustic contrast opacification.   2. Right ventricle: Pacer C/W ICD noted in the right ventricle.   3. Left atrium: The left atrial volume was mildly increased.   4. Right atrium: Pacer C/W ICD noted in right atrium.   5. Mitral valve: There was mild to moderate regurgitation.   6. Pulmonary arteries: Systolic pressure was mildly increased, in the range      of 35mm Hg to 40mm Hg.   Impressions:  This study is compared with previous dated 10/20/2024: No   significant change in ejection  fraction.   *      10/20 Echo:  1. Left ventricle: The cavity size was increased. Wall thickness was normal.      Systolic function was reduced. The estimated ejection fraction was      15-20%, by visual assessment. There was severe diffuse hypokinesis with      regional variations. Doppler parameters are consistent with abnormal left      ventricular relaxation - grade 1 diastolic dysfunction.   2. Right ventricle: Pacer wire noted in the right ventricle.   3. Left atrium: The left atrial volume was mildly increased.   4. Mitral valve: There was mild regurgitation.   Impressions:  This study is compared with previous dated 11/14/2023: Left ventricular function remains poor.     US Venous Doppler:    Impression   CONCLUSION:    1. Findings are consistent with superficial thrombophlebitis involving the cephalic vein in the proximal, mid and distal left upper arm.  The left cephalic vein in the forearm is patent.  2. There is occlusive superficial thrombophlebitis involving the median antecubital vein.  3. No DVT is identified.          11/4 Portable CXR:   1. Mild pulmonary vascular congestion and patchy bilateral ground-glass type parahilar opacities, suspicious for mild pulmonary edema/early congestive failure.   2. Minimal blunting of the costophrenic angles bilaterally suspicious for atelectasis versus mild effusions.   3. Stable cardiomegaly.     Labs:  Component      Latest Ref Rng 11/15/2024   Glucose      70 - 99 mg/dL 138 (H)    Sodium      136 - 145 mmol/L 142    Potassium      3.5 - 5.1 mmol/L 4.2    Chloride      98 - 112 mmol/L 106    Carbon Dioxide, Total      21.0 - 32.0 mmol/L 31.0    ANION GAP      0 - 18 mmol/L 5    BUN      9 - 23 mg/dL 39 (H)    CREATININE      0.70 - 1.30 mg/dL 2.43 (H)    CALCIUM      8.7 - 10.4 mg/dL 9.3    CALCULATED OSMOLALITY      275 - 295 mOsm/kg 306 (H)    EGFR      >=60 mL/min/1.73m2 26 (L)    Patient Fasting for BMP? No    WBC      4.0 - 11.0 x10(3) uL 4.9    RBC       3.80 - 5.80 x10(6)uL 3.53 (L)    Hemoglobin      13.0 - 17.5 g/dL 10.6 (L)    Hematocrit      39.0 - 53.0 % 32.7 (L)    Platelet Count      150.0 - 450.0 10(3)uL 164.0    MCV      80.0 - 100.0 fL 92.6    MCH      26.0 - 34.0 pg 30.0    MCHC      31.0 - 37.0 g/dL 32.4    RDW      % 17.4    TSH      0.550 - 4.780 uIU/mL 7.029 (H)    pro-BETA NATRIURETIC PEPTIDE      <450 pg/mL 16,483 (H)       Legend:  (H) High  (L) Low    [x] BiV/ICD device, Medtronic, unsuccesful attempt at upgrade to BiV  [] CardioMEMs  [x] ARNi/ACEi/ARB, intolerant to ARNI with low BP  [x] BB  [] MRA, intolerant with hyperkalemia. To consider Kerendia   [] SGLT2i, intolerant to with diarrhea. May try Farxiga when not hypovolemic.   [] Verquvo, benefits not substantiated with ProBNP of 16,000  [] Corlanor, HR controlled   [] CCM, can consider since unsuccessful attempt at BiV upgrade if a candidate with LVEF 15%.   [] Barostim, ProBNP excludes him    Education:  Patient and family instructed at length regarding clinic procedures, hours, purpose of clinic visits, sodium restricted diet, low sodium foods, fluid restriction, daily weights, medication regimen, s/s HF exacerbation and when to call APN/clinic.    Assessment  HFrEF, ACC/AHA Stage C, NYHA Class 3.  Ischemic CM s/p Medtronic ICD; LVEF 15%. Intolerant to ARNI with low BP.   CAD with remote CABG;2006; recent ischemic evaluation done due to VT. Cath 10/24 showed 100% RCA and LAD. Lcx with 90% ostial OM1. LIMA-LAD and SVG-RCA patent. S/P PCI OM1. On statin, ASA and Plavix.    CKD Stage 3, recent baseline creatinine ~2-2.3 mg/dL. Metformin and ACE-I stopped during admission in October. ACE-I resumed in November. To follow up with Dr. Adorno.   Anemia, Hgb 10.6 g/dL; improved from 9.6 g/dL likely concentrated.  Last Iron sat 13% . Ferritin 506.   DM Type II, last A1c 6.9% 11/4. Metformin stopped. Intolerant to Jardiance in the past with diarrhea, but will to retry if recommended.   VT with  multiple recent shocks requiring admission. New on Amiodarone in October. Attempt at upgrade to BiV ICD unsuccessful. Follows with Dr. Pedroza and saw him today.    Hyperkalemia, potassium up to 6.1 recently, improved with Lokelma. K 3.1  on discharge. K 4.2 today.   Severe COPD with hx tobacco abuse; on Anoro. Follows with Dr. Clemons. To see him in December with PFT's.     Plan:    Reduce Torsemide to 20 mg on Tuesday, Thursday, Saturday and Sunday. Continue 40 mg on Monday, Wednesday and Friday.   Advised to continue to monitor daily weights, BP. To contact us with ongoing weight loss or daily weight gain.  Consider eventual transition from Coreg to Toprol for less BP effect and retrial of SGLT2i. Will also discuss consideration for Kerendia with Dr. Adorno.   Return to clinic in 10 days.   Continue PT, plan for cardiac rehab after.   Ongoing follow up with [facility], Dr. Pedroza.   Plan to start checking OptiVol in December with recent generator change.   F/u with Dr. Rogers 12/11.   F/U with Dr. Adorno 11/26  F/U with Dr. Clemons 12/11    I have spent 60 min total time on the day of the encounter, including: preparing to see the patient, obtaining and/or reviewing separately obtained history, performing a medically appropriate examination and/or evaluation, counseling and educating the patient/family caregiver, ordering medication, tests, or procedures, referring and communicating with other health care professionals, documenting clinical information in Epic, independently interpreting results and communicating results to the patient/family caregiver, and care coordination    Melodie Silvestre NP

## 2024-11-12 NOTE — CDS QUERY
Documentation by cardiology addended to reflect HFrEF  Dear Dr. Polo:   Please further Validate the documented diagnosis of Acute on Chronic Systolic Heart failure:   (  ) Acute on Chronic Systolic Heart Failure.   (  ) Acute on Chronic Diastolic Heart Failure  (  ) Other (please specify): ___________________  CLINICAL INFORMATION FROM THE MEDICAL RECORD  Possible Risk Factors:  Clinical Indicators:  CARDIOLOGY: Dyspnea - acute on chronic HFpEF  Severe ischemic DCM, EF 15%; remote CABG, AICD, long time smoker with COPD. He is short of breath just getting up in bed.  No acute change just frustrated. In the ER pBNP 30,000, troponin borderline.  Given 40 mg IV lasix and admitted.    2 D echo: Left ventricle: The cavity size was mildly to moderately increased. Systolic function was markedly reduced. The estimated ejection fraction was 15-20%, by visual assessment. There was severe diffuse hypokinesis with regional variations. There was no evidence of a thrombus revealed by acoustic contrast opacification.     Discharge summary: Acute on chronic systolic heart failure exacerbation     pBNP: 30,699    Treatment: card consult lasix 80 mg IV BID, amio, coreg, lisinopril  Use of terms such as suspected, possible, or probable (associated with a specific diagnosis that is being evaluated, monitored, or treated as if it exists) are acceptable and can be coded in the inpatient setting, when documented at the time of discharge.    Please add any additional documentation to your progress note and continue to document this through discharge.    Thank you. For questions regarding this query, please contact Clinical :  Genny Hagan RN #[phone] document is a permanent part of the medical record.

## 2024-11-15 ENCOUNTER — HOSPITAL ENCOUNTER (OUTPATIENT)
Dept: LAB | Facility: HOSPITAL | Age: 79
Discharge: HOME | End: 2024-11-15
Attending: NURSE PRACTITIONER
Payer: MEDICARE

## 2024-11-15 ENCOUNTER — HOSPITAL ENCOUNTER (OUTPATIENT)
Dept: CARDIOLOGY CLINIC | Facility: HOSPITAL | Age: 79
Discharge: HOME | End: 2024-11-15
Attending: NURSE PRACTITIONER
Payer: MEDICARE

## 2024-11-15 VITALS
WEIGHT: 124.19 LBS | BODY MASS INDEX: 19 KG/M2 | OXYGEN SATURATION: 100 % | SYSTOLIC BLOOD PRESSURE: 96 MMHG | DIASTOLIC BLOOD PRESSURE: 32 MMHG

## 2024-11-15 DIAGNOSIS — I25.9 CHRONIC ISCHEMIC HEART DISEASE: ICD-10-CM

## 2024-11-15 DIAGNOSIS — I25.5 ISCHEMIC CARDIOMYOPATHY WITH IMPLANTABLE CARDIOVERTER-DEFIBRILLATOR (ICD): ICD-10-CM

## 2024-11-15 DIAGNOSIS — I50.30 (HFPEF) HEART FAILURE WITH PRESERVED EJECTION FRACTION (HCC): ICD-10-CM

## 2024-11-15 DIAGNOSIS — I47.20 V-TACH (HCC): ICD-10-CM

## 2024-11-15 DIAGNOSIS — I42.9 CARDIOMYOPATHY, UNSPECIFIED TYPE (HCC): ICD-10-CM

## 2024-11-15 DIAGNOSIS — N28.9 RENAL INSUFFICIENCY: ICD-10-CM

## 2024-11-15 DIAGNOSIS — E87.5 HYPERKALEMIA: ICD-10-CM

## 2024-11-15 DIAGNOSIS — Z95.810 ISCHEMIC CARDIOMYOPATHY WITH IMPLANTABLE CARDIOVERTER-DEFIBRILLATOR (ICD): ICD-10-CM

## 2024-11-15 DIAGNOSIS — E11.22 TYPE 2 DIABETES MELLITUS WITH STAGE 3B CHRONIC KIDNEY DISEASE, WITHOUT LONG-TERM CURRENT USE OF INSULIN (HCC): ICD-10-CM

## 2024-11-15 DIAGNOSIS — I50.22 CHRONIC SYSTOLIC CONGESTIVE HEART FAILURE (HCC): Primary | ICD-10-CM

## 2024-11-15 DIAGNOSIS — N18.32 TYPE 2 DIABETES MELLITUS WITH STAGE 3B CHRONIC KIDNEY DISEASE, WITHOUT LONG-TERM CURRENT USE OF INSULIN (HCC): ICD-10-CM

## 2024-11-15 DIAGNOSIS — I50.32 CHRONIC DIASTOLIC HEART FAILURE (HCC): ICD-10-CM

## 2024-11-15 LAB
ANION GAP SERPL CALC-SCNC: 5 MMOL/L (ref 0–18)
BUN BLD-MCNC: 39 MG/DL (ref 9–23)
CALCIUM BLD-MCNC: 9.3 MG/DL (ref 8.7–10.4)
CHLORIDE SERPL-SCNC: 106 MMOL/L (ref 98–112)
CO2 SERPL-SCNC: 31 MMOL/L (ref 21–32)
CREAT BLD-MCNC: 2.43 MG/DL
EGFRCR SERPLBLD CKD-EPI 2021: 26 ML/MIN/1.73M2 (ref 60–?)
ERYTHROCYTE [DISTWIDTH] IN BLOOD BY AUTOMATED COUNT: 17.4 %
FASTING STATUS PATIENT QL REPORTED: NO
GLUCOSE BLD-MCNC: 138 MG/DL (ref 70–99)
HCT VFR BLD AUTO: 32.7 %
HGB BLD-MCNC: 10.6 G/DL
MCH RBC QN AUTO: 30 PG (ref 26–34)
MCHC RBC AUTO-ENTMCNC: 32.4 G/DL (ref 31–37)
MCV RBC AUTO: 92.6 FL
NT-PROBNP SERPL-MCNC: ABNORMAL PG/ML (ref ?–450)
OSMOLALITY SERPL CALC.SUM OF ELEC: 306 MOSM/KG (ref 275–295)
PLATELET # BLD AUTO: 164 10(3)UL (ref 150–450)
POTASSIUM SERPL-SCNC: 4.2 MMOL/L (ref 3.5–5.1)
RBC # BLD AUTO: 3.53 X10(6)UL
SODIUM SERPL-SCNC: 142 MMOL/L (ref 136–145)
T4 FREE SERPL-MCNC: 1.3 NG/DL (ref 0.8–1.7)
TSI SER-ACNC: 7.03 UIU/ML (ref 0.55–4.78)
WBC # BLD AUTO: 4.9 X10(3) UL (ref 4–11)

## 2024-11-15 PROCEDURE — 99215 OFFICE O/P EST HI 40 MIN: CPT | Performed by: NURSE PRACTITIONER

## 2024-11-15 PROCEDURE — 36415 COLL VENOUS BLD VENIPUNCTURE: CPT | Performed by: INTERNAL MEDICINE

## 2024-11-15 PROCEDURE — 83880 ASSAY OF NATRIURETIC PEPTIDE: CPT | Performed by: NURSE PRACTITIONER

## 2024-11-15 PROCEDURE — G2212 PROLONG OUTPT/OFFICE VIS: HCPCS | Performed by: NURSE PRACTITIONER

## 2024-11-15 PROCEDURE — 85027 COMPLETE CBC AUTOMATED: CPT | Performed by: NURSE PRACTITIONER

## 2024-11-15 PROCEDURE — 84443 ASSAY THYROID STIM HORMONE: CPT | Performed by: INTERNAL MEDICINE

## 2024-11-15 PROCEDURE — 84439 ASSAY OF FREE THYROXINE: CPT | Performed by: INTERNAL MEDICINE

## 2024-11-15 PROCEDURE — 80048 BASIC METABOLIC PNL TOTAL CA: CPT | Performed by: NURSE PRACTITIONER

## 2024-11-15 NOTE — PATIENT INSTRUCTIONS
Heart Failure Discharge Instructions    Take 40mg torsemide on Monday, Wednesday & Friday. Take 20 mg Tuesday, Thursday, Saturday & Sunday.   Call with ongoing weight loss.       Activity: Regular exercise and activity is important for your overall health and to help keep your heart strong and functioning as well as possible.   Walk at a slow to moderate pace for 15-20 minutes 3-5 days per week.     Follow these instructions every day to keep yourself in the Green Zone     The Green Zone means you are feeling well and your symptoms are under control                                    Medications  Take your medication every day as instructed  Do not stop taking your medicine or change the amount you are taking without instructions from your doctor or nurse  Do Not take non-steroidal antiinflammatory drugs such as ibuprofen, aleve, advil, or motrin                                    Diet/Fluids  People with heart failure should eat less sodium (salt) and limit fluid. Sodium attracts water and makes the body hold fluid. This extra fluid makes the heart work harder and can worsen the symptoms of heart failure.     Diet    2000 mg sodium daily  Fluid restriction    64 ounces daily  (8 oz. = 1 cup)                                     Body Weight  Weigh yourself every day before breakfast and write your weight down  Use the same scale and wear about the same amount of clothes each time  A sudden weight increase is due to fluid retention rather than fat                                         Activity  Pace your activities to avoid getting overtired  Take rest periods as needed  Elevate your feet to reduce ankle swelling when resting                             Signs of Worsening Heart Failure    You are entering the Yellow Zone - this is a warning zone    Call your doctor or nurse if you have any of these signs or symptoms:  You gain 2 or more pounds overnight or 3-5 pounds in 3-7 days  You have more trouble breathing  You  get more tired with regular activity, or are limiting activity because of shortness of breath or fatigue  You are short of breath lying down, you need more pillows to breathe comfortably,  or wake up during the night short of breath  You urinate less often during the day and more often at night  You have a bloated feeling, upset stomach, loss of appetite, or your clothes are fitting tighter    GO TO THE EMERGENCY DEPARTMENT or CALL 911 IF:    These are signs you are in the RED ZONE - THIS IS AN EMERGENCY  You have tightness or pain in your chest  You are extremely short of breath or can't catch your breath  You cough up frothy pink mucous  You feel confused or can't think clearly  You are traveling and develop symptoms of worsening heart failure     We respect everyone's time and availability. Please be aware that this is not a walk-in clinic and we require appointments in order to facilitate timely care for all patients. We ask you to arrive 30 minutes before your appointment to allow time for you to check-in and have your bloodwork drawn. Please understand if you are late for your appointment, you may be asked to reschedule. If possible, all attempts will be made to accommodate but realize this is no guarantee that this will always be available. We understand there are extenuating circumstances. If you need to cancel or reschedule your appointment, please call the [facility] within 24 hours at [phone].  Thank you for your cooperation, [facility] Staff.    If you are currently [employer] active, starting July 1st 2024, we will be utilizing [employer] messaging ONLY to confirm your appointment.    IF YOU HAVE QUESTIONS REGARDING YOUR BILL, FEEL FREE TO CONTACT [facility] PATIENT ACCOUNTS -[phone]. IF YOU NEED FINANCIAL ASSISTANCE, PLEASE CALL AN [facility] FINANCIAL COUNSELOR -[phone].             [facility]     [phone]

## 2024-11-15 NOTE — PROGRESS NOTES
Patent walked in; assessed by APN.   Labs ordered to assess kidney function and drawn by  Lab.   Reviewed follow-up appointment and Heart Failure discharge instructions with patient.  Patient verbalized an understanding.     RTC in 10 days.

## 2024-11-19 NOTE — PROGRESS NOTES
[facility] for Cardiac Health Progress Note    Mingo White is a 79 year old male who presents to clinic for APN assessment and management of chronic systolic heart failure and is functional class 3.     Subjective:  Since his first clinic visit on 11/15 when Torsemide was reduced to 20 mg Tuesday, Thursday, Saturday and Sunday from 40 mg I have been in contact with Dr. Adorno and he is okay with addition of an SGLT2i with diuretic reduction.     He presents with his wife for follow up today. He has been doing okay but admits to feeling more weak the last few days. SBP's have been in the lower 80's with DBP's in the 40's the last few days. Previously they were in the upper 80's to 90's/50-60's. He takes BP at the same time he takes medications. He admits to feeling groggy upon rising in the AM and has to sit at the side of the bed for a few minutes before getting up. Weight is down 2 lbs on our scale and ~119-120 lbs at home and stable. His appetite is poor since he has no taste he attributes to Amiodarone but he still eats 3 meals a day and has a Strawberry protein drink. Shortness of breath is unchanged, he notices it when he does more. He continues to work with PT and feels it helps but legs remain weak. He denies orthopnea or abdominal bloating.       Recent hospitalizations:    He was hospitalized 10/19-10/28 after multiple ICD shocks. Prior to the hospital stay his treatment threshold as reduced due to NSVT occurring below the threshold for therapy. He was started on Amiodarone with loading dose. Noted to be hyperkalemic (given Lokelma) on arrival with worsening renal function in the setting of increased diuretics recently for decompensation. Stay c/b acute respiratory failure prompting pulmonary consult and treatment with IV steroids and Bipap. Seen by Nephrology and gently hydrated. ACE-I stopped. 4 doses of IV Ferric given.   Underwent generator change. Attempt at upgrade to CRT-D  unsuccessful. Underwent angiogram and PCI/NORRIS to OM. Echo with LVEF 15-20%, Grade I DD, low normal RV function, mild to MR, Discharged at 134 lbs. To follow up with Nephrology and Pulmonary after discharge.      He was back in the ED and admitted from 11/4-11/6 with dyspnea. ProBNP was 30,000 on arrival and discharge. CXR with mild pulmonary vascular congestion. Seen by pulmonary; no clinical signs of infection. Leukocytosis felt to be related to recent steroids. Symptoms improved with diuresis. Lisinopril resumed. Seen by Palliative Care to discuss goals of care; code status updated. ICD felt to still be appropriate even though he doesn't want CPR. Referred to outpatient Residential Palliative. Discharged at 126 lbs.      HPI: He carries history of HFrEF, ICM, CAD s/p CABG 2006, VT s/p Medtronic ICD, tobacco abuse, AAA s/p EVAR 2008 with small endoleak (follows with Dr. White), DM Type II, CKD (follows with Dr. Adorno), HDL, hyperkalemia limiting GDMT, left renal artery stenosis, bilateral moderate carotid artery disease and COPD.      Review of Systems   Constitutional: Positive for decreased appetite, malaise/fatigue and weight loss.   Cardiovascular:  Positive for dyspnea on exertion. Negative for leg swelling.   Respiratory:  Negative for sleep disturbances due to breathing.    Gastrointestinal:  Negative for bloating.       HISTORY:  Past Medical History:    Anesthesia complication    was \"out of it\" for a long time after sedation with previous colonoscopies    Back problem    Carotid artery disease (HCC)    Congestive heart disease (HCC)    CORONARY ARTERY DISEASE    Coronary atherosclerosis    DIABETES    Diabetes (HCC)    Gastric ulcer    HEART ATTACK    age 61    High blood pressure    HYPERLIPIDEMIA    Sinus drainage    with weather changes      Past Surgical History:   Procedure Laterality Date    Cardiac defibrillator placement  2007    Medtronic dual chamber ICD, not pacemaker dependent    Colonoscopy   4/6/11 - DANNIE Espinoza    adenoma, fair prep, hemorrhoids, repeat 2013-14.    Colonoscopy  4/10/2014    DANNIE Espinoza. 6 adenomas, hemorrhoids, repeat 2017 w/ split 4L prep.     Colonoscopy  07/24/2017    Diverticulosis, Hemorrhoids.  Repeat PRN    Colonoscopy N/A 7/24/2017    Procedure: COLONOSCOPY;  Surgeon: Brad Tapia MD;  Location:  ENDOSCOPY    Open heart surgery (pbp)      Double bypass - done in [facility]    Other      repair AAA    Upper gi endoscopy performed  4/6/11 - DANNIE Espinoza    small , no H pylori.       Family History   Problem Relation Age of Onset    Heart Disorder Father     Heart Disorder Mother     Diabetes Maternal Grandmother       Social History     Socioeconomic History    Marital status:    Occupational History    Occupation: chemical technologist     Comment: retire age 54   Tobacco Use    Smoking status: Every Day     Current packs/day: 0.50     Average packs/day: 0.5 packs/day for 50.0 years (25.0 ttl pk-yrs)     Types: Cigarettes    Smokeless tobacco: Never   Vaping Use    Vaping status: Never Used   Substance and Sexual Activity    Alcohol use: No     Alcohol/week: 0.0 standard drinks of alcohol     Comment: quit 2006    Drug use: No   Other Topics Concern     Service Yes     Comment: [employer] - [employer] exposure    Blood Transfusions No    Caffeine Concern No    Occupational Exposure No    Hobby Hazards No    Sleep Concern No    Stress Concern No    Weight Concern No    Special Diet Yes    Back Care Yes    Exercise Yes    Seat Belt Yes   Social History Narrative    Likes to do crossword puzzles    Active with grand kids and their sports    Socializes with friends     Social Drivers of Health     Food Insecurity: No Food Insecurity (11/5/2024)    Food Insecurity     Food Insecurity: Never true   Transportation Needs: No Transportation Needs (11/5/2024)    Transportation Needs     Lack of Transportation: No   Housing Stability: Low Risk  (11/5/2024)    Housing Stability      Housing Instability: No           Objective:    Telemetry: V-paced        BP (!) 88/37   Pulse 59   Resp 13   Wt 122 lb 9.6 oz (55.6 kg)   SpO2 100%   BMI 19.20 kg/m²     Wt Readings from Last 6 Encounters:   11/25/24 122 lb 9.6 oz (55.6 kg)   11/15/24 124 lb 3.2 oz (56.3 kg)   11/22/24 119 lb 12.8 oz (54.3 kg)   10/28/24 134 lb 11.2 oz (61.1 kg)   11/15/23 113 lb 1.6 oz (51.3 kg)   03/21/22 124 lb (56.2 kg)            Recent Results (from the past 24 hours)   Basic Metabolic Panel (8)    Collection Time: 11/25/24  8:23 AM   Result Value Ref Range    Glucose 153 (H) 70 - 99 mg/dL    Sodium 139 136 - 145 mmol/L    Potassium 4.6 3.5 - 5.1 mmol/L    Chloride 105 98 - 112 mmol/L    CO2 26.0 21.0 - 32.0 mmol/L    Anion Gap 8 0 - 18 mmol/L    BUN 45 (H) 9 - 23 mg/dL    Creatinine 2.38 (H) 0.70 - 1.30 mg/dL    Calcium, Total 9.0 8.7 - 10.4 mg/dL    Calculated Osmolality 303 (H) 275 - 295 mOsm/kg    eGFR-Cr 27 (L) >=60 mL/min/1.73m2    Patient Fasting for BMP? No          Current Outpatient Medications:     [START ON 11/27/2024] Torsemide 40 MG Oral Tab, Take 20 mg by mouth daily., Disp: , Rfl:     metoprolol succinate ER 50 MG Oral Tablet 24 Hr, Take 1 tablet (50 mg total) by mouth in the morning and 1 tablet (50 mg total) before bedtime., Disp: 60 tablet, Rfl: 3    lisinopril 5 MG Oral Tab, Take 1 tablet (5 mg total) by mouth daily., Disp: 30 tablet, Rfl: 0    acetaminophen 325 MG Oral Tab, Take 2 tablets (650 mg total) by mouth every 6 (six) hours as needed for Pain or Fever., Disp: 30 tablet, Rfl: 0    insulin aspart (NOVOLOG FLEXPEN) 100 Units/mL Subcutaneous Solution Pen-injector, Test blood glucose 3 times daily with meals Inject 1 unit if blood glucose is between 141-180 mg/dL Inject 2 units if blood glucose is between 181-220 mg/dL Inject 3 units if blood glucose is between 221-260 mg/dL Inject 4 units if blood glucose is between 261-300 mg/dL Inject 5 units if blood glucose is between 301-350 mg/dL Call  your physician if blood glucose is greater than 351 mg/dL,, Disp: 5 each, Rfl: 3    Blood Glucose Monitoring Suppl (ONETOUCH VERIO FLEX SYSTEM) w/Device Does not apply Kit, Test blood sugar 3 times per day., Disp: 1 kit, Rfl: 0    OneTouch Delica Lancets 33G Does not apply Misc, Test blood sugar 3 times per day., Disp: 100 each, Rfl: 6    pantoprazole 40 MG Oral Tab EC, Take 1 tablet (40 mg total) by mouth every morning before breakfast., Disp: 90 tablet, Rfl: 0    atorvastatin 40 MG Oral Tab, Take 1 tablet (40 mg total) by mouth nightly., Disp: 30 tablet, Rfl: 1    umeclidinium-vilanterol 62.5-25 MCG/ACT Inhalation Aerosol Powder, Breath Activated, Inhale 1 puff into the lungs daily., Disp: 1 each, Rfl: 0    amiodarone 200 MG Oral Tab, Take 1 tablet (200 mg total) by mouth 2 (two) times daily with meals., Disp: 90 tablet, Rfl: 0    Glucose Blood (PRODIGY NO CODING BLOOD GLUC) In Vitro Strip, Use as directed to test blood sugar once daily, Disp: 100 strip, Rfl: 3    Glucose Blood (ONETOUCH VERIO) In Vitro Strip, Test blood sugar 3 times per day., Disp: 100 strip, Rfl: 6    albuterol 108 (90 Base) MCG/ACT Inhalation Aero Soln, Inhale 2 puffs into the lungs every 4 (four) hours as needed for Wheezing., Disp: 1 each, Rfl: 0    Prodigy Lancets 28G Does not apply Misc, Test once daily, Disp: 100 each, Rfl: 3    Lancet Device Does not apply Misc, MEASURE HOME GLUCOSE DAILY --DIRECTED, Disp: 1 each, Rfl: 11    CLOPIDOGREL BISULFATE 75 MG Oral Tab, TAKE 1 TABLET DAILY, Disp: 90 tablet, Rfl: 3    ASPIRIN 81 MG OR TABS, Take 1 tablet (81 mg total) by mouth at bedtime., Disp: , Rfl:     Exam:   General:         Alert, in no apparent distress  HEENT:          no JVD at 90 degrees   Lungs:            diminished throughout lung fields worse at the bases   CV:                  S1, S2, regular   Abdomen:       Non-distended, soft, non-tender, BS+  Extremities:    no edema  Neuro:             A&O x 3, MONSIVAIS  Skin:                Pink,  warm, dry      [x] BiV/ICD device, Medtronic, unsuccesful attempt at upgrade to BiV  [] CardioMEMs  [x] ARNi/ACEi/ARB, intolerant to ARNI with low BP  [x] BB  [] MRA, intolerant with hyperkalemia. To consider Kerendia   [] SGLT2i, intolerant to with diarrhea. May try Farxiga when not hypovolemic.   [] Verquvo, benefits not substantiated with ProBNP of 16,000  [] Corlanor, HR controlled   [] [facility], can consider since unsuccessful attempt at BiV upgrade if a candidate with LVEF 15%.   [] Barostim, ProBNP excludes him    Education:  Patient instructed regarding sodium restricted diet, low sodium foods, fluid restriction, daily weights, medication regimen, s/s HF exacerbation and when to call APN/clinic.    Assessment:   HFrEF, ACC/AHA Stage C, NYHA Class 3.  Ischemic CM s/p Medtronic ICD; LVEF 15%. Intolerant to ARNI with low BP. Hypovolemic. ProBNP 15,726 on 11/23, continues to improve from 16,483 his first visit. ProBNP up to 30,781 in the hospital and remained elevated on discharge.    CAD with remote CABG;2006; recent ischemic evaluation done due to VT. Cath 10/24 showed 100% RCA and LAD. Lcx with 90% ostial OM1. LIMA-LAD and SVG-RCA patent. S/P PCI OM1. On statin, ASA and Plavix.    CKD Stage 3, recent baseline creatinine ~2-2.3 mg/dL. Suspect renal indices will improve with more renal perfusion  on less diuretics and Toprol instead of Coreg. Metformin and ACE-I stopped during admission in October. ACE-I resumed in November. To follow up with Dr. Adorno.   Anemia,  last Hgb 10.6 g/dL; improved from 9.6 g/dL likely concentrated. Last Iron sat 13%, Ferritin 506.   DM Type II, last A1c 6.9% 11/4. Metformin stopped. Intolerant to Jardiance in the past with diarrhea, but will to retry if recommended.   VT with multiple recent shocks requiring admission. New on Amiodarone in October. Attempt at upgrade to BiV ICD unsuccessful. Follows with Dr. Pedroza, saw him 11/15.  Hyperkalemia, potassium up to 6.1 recently, improved  with Lokelma. K 3.1  on discharge. K 4.6 today  Severe COPD with hx tobacco abuse; on Anoro. Follows with Dr. Clemons. To see him in December with PFT's.     Plan:     Stop Coreg and start Toprol 50 mg BID for less BP effect.   Encouraged to continue to follow BP at home and contact us if BP remains in the low 80's/40's.  Hold Torsemide tomorrow (20 mg) then reduce dose to 20 mg daily starting on Wednesday from 40 mg on Monday, Wednesday, Friday and 20 mg all others.  Once volume status improved will plan to add SGLT2i and adjust diuretics as needed.   Encouraged to contact us with weight loss.   Continue PT, plan for cardiac rehab after.   Ongoing follow up with Raheem YADAV, Dr. Pedroza.   Plan to start checking OptiVol in December with recent generator change.   Return to clinic in 1 week.  F/u with Dr. Rogers in December.   F/U with Dr. Adorno 11/26.   F/U with Dr. Clemons 12/11  CHF discharge instructions given    I have spent 40 min total time on the day of the encounter, including: preparing to see the patient, obtaining and/or reviewing separately obtained history, performing a medically appropriate examination and/or evaluation, counseling and educating the patient/family caregiver, ordering medication, tests, or procedures, referring and communicating with other health care professionals, documenting clinical information in Epic, and independently interpreting results and communicating results to the patient/family caregiver      KARINA Israel

## 2024-11-25 ENCOUNTER — HOSPITAL ENCOUNTER (OUTPATIENT)
Dept: LAB | Facility: HOSPITAL | Age: 79
Discharge: HOME | End: 2024-11-25
Attending: NURSE PRACTITIONER
Payer: MEDICARE

## 2024-11-25 ENCOUNTER — HOSPITAL ENCOUNTER (OUTPATIENT)
Dept: CARDIOLOGY CLINIC | Facility: HOSPITAL | Age: 79
End: 2024-11-25
Attending: NURSE PRACTITIONER
Payer: MEDICARE

## 2024-11-25 VITALS
WEIGHT: 122.63 LBS | DIASTOLIC BLOOD PRESSURE: 37 MMHG | HEART RATE: 59 BPM | SYSTOLIC BLOOD PRESSURE: 88 MMHG | RESPIRATION RATE: 13 BRPM | OXYGEN SATURATION: 100 % | BODY MASS INDEX: 19 KG/M2

## 2024-11-25 DIAGNOSIS — I50.30 (HFPEF) HEART FAILURE WITH PRESERVED EJECTION FRACTION (HCC): Primary | ICD-10-CM

## 2024-11-25 DIAGNOSIS — N18.4 CKD (CHRONIC KIDNEY DISEASE) STAGE 4, GFR 15-29 ML/MIN (HCC): ICD-10-CM

## 2024-11-25 DIAGNOSIS — I25.9 CHRONIC ISCHEMIC HEART DISEASE: ICD-10-CM

## 2024-11-25 DIAGNOSIS — G89.29 CHRONIC RIGHT-SIDED LOW BACK PAIN WITH RIGHT-SIDED SCIATICA: ICD-10-CM

## 2024-11-25 DIAGNOSIS — M54.41 CHRONIC RIGHT-SIDED LOW BACK PAIN WITH RIGHT-SIDED SCIATICA: ICD-10-CM

## 2024-11-25 DIAGNOSIS — E87.5 HYPERKALEMIA: ICD-10-CM

## 2024-11-25 DIAGNOSIS — I50.22 CHRONIC SYSTOLIC CONGESTIVE HEART FAILURE (HCC): ICD-10-CM

## 2024-11-25 DIAGNOSIS — I42.9 CARDIOMYOPATHY, UNSPECIFIED TYPE (HCC): ICD-10-CM

## 2024-11-25 DIAGNOSIS — I50.32 CHRONIC DIASTOLIC HEART FAILURE (HCC): ICD-10-CM

## 2024-11-25 DIAGNOSIS — Z95.810 AUTOMATIC IMPLANTABLE CARDIOVERTER-DEFIBRILLATOR IN SITU: ICD-10-CM

## 2024-11-25 DIAGNOSIS — Z95.810 ISCHEMIC CARDIOMYOPATHY WITH IMPLANTABLE CARDIOVERTER-DEFIBRILLATOR (ICD): Primary | ICD-10-CM

## 2024-11-25 DIAGNOSIS — I25.5 ISCHEMIC CARDIOMYOPATHY WITH IMPLANTABLE CARDIOVERTER-DEFIBRILLATOR (ICD): Primary | ICD-10-CM

## 2024-11-25 LAB
ANION GAP SERPL CALC-SCNC: 8 MMOL/L (ref 0–18)
BUN BLD-MCNC: 45 MG/DL (ref 9–23)
CALCIUM BLD-MCNC: 9 MG/DL (ref 8.7–10.4)
CHLORIDE SERPL-SCNC: 105 MMOL/L (ref 98–112)
CO2 SERPL-SCNC: 26 MMOL/L (ref 21–32)
CREAT BLD-MCNC: 2.38 MG/DL
EGFRCR SERPLBLD CKD-EPI 2021: 27 ML/MIN/1.73M2 (ref 60–?)
FASTING STATUS PATIENT QL REPORTED: NO
GLUCOSE BLD-MCNC: 153 MG/DL (ref 70–99)
OSMOLALITY SERPL CALC.SUM OF ELEC: 303 MOSM/KG (ref 275–295)
POTASSIUM SERPL-SCNC: 4.6 MMOL/L (ref 3.5–5.1)
SODIUM SERPL-SCNC: 139 MMOL/L (ref 136–145)

## 2024-11-25 PROCEDURE — 36415 COLL VENOUS BLD VENIPUNCTURE: CPT | Performed by: NURSE PRACTITIONER

## 2024-11-25 PROCEDURE — 80048 BASIC METABOLIC PNL TOTAL CA: CPT | Performed by: NURSE PRACTITIONER

## 2024-11-25 PROCEDURE — 99215 OFFICE O/P EST HI 40 MIN: CPT | Performed by: NURSE PRACTITIONER

## 2024-11-25 RX ORDER — METOPROLOL SUCCINATE 50 MG/1
50 TABLET, EXTENDED RELEASE ORAL 2 TIMES DAILY
Qty: 60 TABLET | Refills: 3 | Status: SHIPPED | OUTPATIENT
Start: 2024-11-25

## 2024-11-25 NOTE — PROGRESS NOTES
Patient assessed. Patient complaining of occasionally feeling weak in the morning after waking up. Patient denies any issues with bloating and edema. Patient reports his shortness of breath has improved some since his last visit. Weight down 2 lbs at 122.6 lbs. APN notified of all the above information. Labs ordered and drawn by  Lab. Reviewed allergies and list of current medications with patient and updated it in the Electronic Medical Record.     Educated patient on low sodium diet and food choices, fluid restriction of 2 liters, and daily weights. Reviewed follow-up appointments and discharge Heart Failure instructions with patient. Patient verbalized an understanding.  Patient to return to the clinic in 2 weeks.

## 2024-11-25 NOTE — PATIENT INSTRUCTIONS
Heart Failure Discharge Instructions    Hold Torsemide tomorrow. Start taking Torsemide 20 mg daily on Wednesday.   Stop Coreg and start Toprol 50 mg twice a day.  Continue to follow BP and contact us if BP is consistently in the low 80's/40's.   Make sure to get in ~ 64 oz fluid daily.   Call with weight loss.       Activity: Regular exercise and activity is important for your overall health and to help keep your heart strong and functioning as well as possible.   Walk at a slow to moderate pace for 15-20 minutes 3-5 days per week.     Follow these instructions every day to keep yourself in the Green Zone     The Green Zone means you are feeling well and your symptoms are under control                                    Medications  Take your medication every day as instructed  Do not stop taking your medicine or change the amount you are taking without instructions from your doctor or nurse  Do Not take non-steroidal antiinflammatory drugs such as ibuprofen, aleve, advil, or motrin                                    Diet/Fluids  People with heart failure should eat less sodium (salt) and limit fluid. Sodium attracts water and makes the body hold fluid. This extra fluid makes the heart work harder and can worsen the symptoms of heart failure.     Diet    2000 mg sodium daily  Fluid restriction    64 ounces daily  (8 oz. = 1 cup)                                     Body Weight  Weigh yourself every day before breakfast and write your weight down  Use the same scale and wear about the same amount of clothes each time  A sudden weight increase is due to fluid retention rather than fat                                         Activity  Pace your activities to avoid getting overtired  Take rest periods as needed  Elevate your feet to reduce ankle swelling when resting                             Signs of Worsening Heart Failure    You are entering the Yellow Zone - this is a warning zone    Call your doctor or nurse if  you have any of these signs or symptoms:  You gain 2 or more pounds overnight or 3-5 pounds in 3-7 days  You have more trouble breathing  You get more tired with regular activity, or are limiting activity because of shortness of breath or fatigue  You are short of breath lying down, you need more pillows to breathe comfortably,  or wake up during the night short of breath  You urinate less often during the day and more often at night  You have a bloated feeling, upset stomach, loss of appetite, or your clothes are fitting tighter    GO TO THE EMERGENCY DEPARTMENT or CALL 911 IF:    These are signs you are in the RED ZONE - THIS IS AN EMERGENCY  You have tightness or pain in your chest  You are extremely short of breath or can't catch your breath  You cough up frothy pink mucous  You feel confused or can't think clearly  You are traveling and develop symptoms of worsening heart failure     We respect everyone's time and availability. Please be aware that this is not a walk-in clinic and we require appointments in order to facilitate timely care for all patients. We ask you to arrive 30 minutes before your appointment to allow time for you to check-in and have your bloodwork drawn. Please understand if you are late for your appointment, you may be asked to reschedule. If possible, all attempts will be made to accommodate but realize this is no guarantee that this will always be available. We understand there are extenuating circumstances. If you need to cancel or reschedule your appointment, please call the [facility] within 24 hours at [phone].  Thank you for your cooperation, [facility] Staff.    If you are currently [employer] active, starting July 1st 2024, we will be utilizing [employer] messaging ONLY to confirm your appointment.    IF YOU HAVE QUESTIONS REGARDING YOUR BILL, FEEL FREE TO CONTACT [facility] PATIENT ACCOUNTS -[phone]. IF YOU NEED FINANCIAL ASSISTANCE, PLEASE CALL AN [facility] FINANCIAL  COUNSELOR -[phone].             [place] [facility]     [phone]

## 2024-11-27 VITALS
OXYGEN SATURATION: 100 % | DIASTOLIC BLOOD PRESSURE: 56 MMHG | SYSTOLIC BLOOD PRESSURE: 100 MMHG | HEART RATE: 60 BPM | TEMPERATURE: 98 F | RESPIRATION RATE: 20 BRPM | WEIGHT: 120 LBS | HEIGHT: 67 IN | BODY MASS INDEX: 18.83 KG/M2

## 2024-12-03 ENCOUNTER — HOSPITAL ENCOUNTER (OUTPATIENT)
Dept: LAB | Facility: HOSPITAL | Age: 79
Discharge: HOME | End: 2024-12-03
Attending: NURSE PRACTITIONER
Payer: MEDICARE

## 2024-12-03 ENCOUNTER — HOSPITAL ENCOUNTER (OUTPATIENT)
Dept: CARDIOLOGY CLINIC | Facility: HOSPITAL | Age: 79
Discharge: HOME | End: 2024-12-03
Attending: NURSE PRACTITIONER
Payer: MEDICARE

## 2024-12-03 VITALS
DIASTOLIC BLOOD PRESSURE: 36 MMHG | SYSTOLIC BLOOD PRESSURE: 95 MMHG | HEART RATE: 59 BPM | WEIGHT: 120.19 LBS | BODY MASS INDEX: 19 KG/M2 | OXYGEN SATURATION: 100 % | RESPIRATION RATE: 15 BRPM

## 2024-12-03 DIAGNOSIS — Z95.810 ISCHEMIC CARDIOMYOPATHY WITH IMPLANTABLE CARDIOVERTER-DEFIBRILLATOR (ICD): ICD-10-CM

## 2024-12-03 DIAGNOSIS — I50.22 CHRONIC SYSTOLIC CONGESTIVE HEART FAILURE (HCC): Primary | ICD-10-CM

## 2024-12-03 DIAGNOSIS — E86.0 DEHYDRATION: ICD-10-CM

## 2024-12-03 DIAGNOSIS — I25.5 ISCHEMIC CARDIOMYOPATHY WITH IMPLANTABLE CARDIOVERTER-DEFIBRILLATOR (ICD): ICD-10-CM

## 2024-12-03 DIAGNOSIS — I50.30 (HFPEF) HEART FAILURE WITH PRESERVED EJECTION FRACTION (HCC): ICD-10-CM

## 2024-12-03 LAB
ANION GAP SERPL CALC-SCNC: 8 MMOL/L (ref 0–18)
BUN BLD-MCNC: 49 MG/DL (ref 9–23)
CALCIUM BLD-MCNC: 9.3 MG/DL (ref 8.7–10.4)
CHLORIDE SERPL-SCNC: 105 MMOL/L (ref 98–112)
CO2 SERPL-SCNC: 28 MMOL/L (ref 21–32)
CREAT BLD-MCNC: 2.46 MG/DL
EGFRCR SERPLBLD CKD-EPI 2021: 26 ML/MIN/1.73M2 (ref 60–?)
GLUCOSE BLD-MCNC: 102 MG/DL (ref 70–99)
OSMOLALITY SERPL CALC.SUM OF ELEC: 305 MOSM/KG (ref 275–295)
POTASSIUM SERPL-SCNC: 4.9 MMOL/L (ref 3.5–5.1)
SODIUM SERPL-SCNC: 141 MMOL/L (ref 136–145)

## 2024-12-03 PROCEDURE — 80048 BASIC METABOLIC PNL TOTAL CA: CPT | Performed by: NURSE PRACTITIONER

## 2024-12-03 PROCEDURE — 99215 OFFICE O/P EST HI 40 MIN: CPT | Performed by: NURSE PRACTITIONER

## 2024-12-03 PROCEDURE — 36415 COLL VENOUS BLD VENIPUNCTURE: CPT | Performed by: NURSE PRACTITIONER

## 2024-12-03 RX ORDER — TORSEMIDE 20 MG/1
10 TABLET ORAL DAILY
COMMUNITY
Start: 2024-12-03

## 2024-12-03 NOTE — PROGRESS NOTES
[facility] Progress Note    Mingo White is a 79 year old male who presents to clinic for APN assessment and management of chronic systolic heart failure and is functional class 3.     Subjective:  Since his last clinic visit on 11/25; when we stopped Coreg and started Toprol 50 mg BID and instructed to hold Torsemide one dose, then reduce dose to 20 mg daily, his weight is down 2 lbs.     He saw Dr. Adorno on 11/26 for CKD. Baseline creatinine about 1.9-2.2 mg/dL. Thought recent worsening renal function related to hypotension and poor appetite. Stopped Lisinopril with plans to repeat labs in 1 week. Follow-up in 4-6 weeks.     Today, he is accompanied by his wife. He continues to have loss of appetite which he attributes his losing taste to the Amiodarone. He does eat 3 meals per day consisting of a pancake or toast for breakfast, 1-2 sandwiches for lunch and meat with potatoes for dinner. He does also incorporate salads with veggies and fruit into his diet. He also has a protein drink daily. Recommended he have some protein in the morning, which may include eggs. He is currently receiving home PT. He does not dyspnea when working with PT or walking too fast. He has noticed since his ACEi was discontinued by Nephrology that his SBP's have been averaging around the 90's and improved. He still notes ongoing fatigue and weakness. He sleeps well.     Last Hospitalizations/ED visits:    He was back in the ED and admitted from 11/4-11/6 with dyspnea. ProBNP was 30,000 on arrival and discharge. CXR with mild pulmonary vascular congestion. Seen by pulmonary; no clinical signs of infection. Leukocytosis felt to be related to recent steroids. Symptoms improved with diuresis. Lisinopril resumed. Seen by Palliative Care to discuss goals of care; code status updated. ICD felt to still be appropriate even though he doesn't want CPR. Referred to outpatient Residential Palliative. Discharged at  126 lbs.     He was hospitalized 10/19-10/28 after multiple ICD shocks. Prior to the hospital stay his treatment threshold as reduced due to NSVT occurring below the threshold for therapy. He was started on Amiodarone with loading dose. Noted to be hyperkalemic (given Lokelma) on arrival with worsening renal function in the setting of increased diuretics recently for decompensation. Stay c/b acute respiratory failure prompting pulmonary consult and treatment with IV steroids and Bipap. Seen by Nephrology and gently hydrated. ACE-I stopped. 4 doses of IV Ferric given.   Underwent generator change. Attempt at upgrade to CRT-D unsuccessful. Underwent angiogram and PCI/NORRIS to OM. Echo with LVEF 15-20%, Grade I DD, low normal RV function, mild to MR, Discharged at 134 lbs. To follow up with Nephrology and Pulmonary after discharge.     Review of Systems   Constitutional: Positive for decreased appetite, malaise/fatigue and weight loss.   Cardiovascular: Negative.    Respiratory: Negative.     Gastrointestinal: Negative.        HISTORY:  Past Medical History:    Anesthesia complication    was \"out of it\" for a long time after sedation with previous colonoscopies    Back problem    Carotid artery disease (HCC)    Congestive heart disease (HCC)    CORONARY ARTERY DISEASE    Coronary atherosclerosis    DIABETES    Diabetes (HCC)    Gastric ulcer    HEART ATTACK    age 61    High blood pressure    HYPERLIPIDEMIA    Sinus drainage    with weather changes      Past Surgical History:   Procedure Laterality Date    Cardiac defibrillator placement  2007    Medtronic dual chamber ICD, not pacemaker dependent    Colonoscopy  4/6/11 - DANNIE Espinoza    adenoma, fair prep, hemorrhoids, repeat 2013-14.    Colonoscopy  4/10/2014    DANNIE Espinoza. 6 adenomas, hemorrhoids, repeat 2017 w/ split 4L prep.     Colonoscopy  07/24/2017    Diverticulosis, Hemorrhoids.  Repeat PRN    Colonoscopy N/A 7/24/2017    Procedure: COLONOSCOPY;  Surgeon: Brad Tapia  MD;  Location:  ENDOSCOPY    Open heart surgery (pbp)      Double bypass - done in [facility]    Other      repair AAA    Upper gi endoscopy performed  4/6/11 - DANNIE BLACKMAN, no H pylori.       Family History   Problem Relation Age of Onset    Heart Disorder Father     Heart Disorder Mother     Diabetes Maternal Grandmother       Social History     Socioeconomic History    Marital status:    Occupational History    Occupation: chemical technologist     Comment: retire age 54   Tobacco Use    Smoking status: Every Day     Current packs/day: 0.50     Average packs/day: 0.5 packs/day for 50.0 years (25.0 ttl pk-yrs)     Types: Cigarettes    Smokeless tobacco: Never   Vaping Use    Vaping status: Never Used   Substance and Sexual Activity    Alcohol use: No     Alcohol/week: 0.0 standard drinks of alcohol     Comment: quit 2006    Drug use: No   Other Topics Concern     Service Yes     Comment: [employer] - [employer] exposure    Blood Transfusions No    Caffeine Concern No    Occupational Exposure No    Hobby Hazards No    Sleep Concern No    Stress Concern No    Weight Concern No    Special Diet Yes    Back Care Yes    Exercise Yes    Seat Belt Yes   Social History Narrative    Likes to do crossword puzzles    Active with grand kids and their sports    Socializes with friends     Social Drivers of Health     Food Insecurity: No Food Insecurity (11/5/2024)    Food Insecurity     Food Insecurity: Never true   Transportation Needs: No Transportation Needs (11/5/2024)    Transportation Needs     Lack of Transportation: No   Housing Stability: Low Risk  (11/5/2024)    Housing Stability     Housing Instability: No           Objective:    Cardiac Rhythm: Atrial paced    BP 95/36   Pulse 59   Resp 15   Wt 120 lb 3.2 oz (54.5 kg)   SpO2 100%   BMI 18.83 kg/m²     Wt Readings from Last 6 Encounters:   12/03/24 120 lb 3.2 oz (54.5 kg)   11/25/24 122 lb 9.6 oz (55.6 kg)   11/15/24 124 lb 3.2 oz  (56.3 kg)   11/27/24 120 lb (54.4 kg)   10/28/24 134 lb 11.2 oz (61.1 kg)   11/15/23 113 lb 1.6 oz (51.3 kg)            Recent Results (from the past 24 hours)   Basic Metabolic Panel (8)    Collection Time: 12/03/24  4:01 PM   Result Value Ref Range    Glucose 102 (H) 70 - 99 mg/dL    Sodium 141 136 - 145 mmol/L    Potassium 4.9 3.5 - 5.1 mmol/L    Chloride 105 98 - 112 mmol/L    CO2 28.0 21.0 - 32.0 mmol/L    Anion Gap 8 0 - 18 mmol/L    BUN 49 (H) 9 - 23 mg/dL    Creatinine 2.46 (H) 0.70 - 1.30 mg/dL    Calcium, Total 9.3 8.7 - 10.4 mg/dL    Calculated Osmolality 305 (H) 275 - 295 mOsm/kg    eGFR-Cr 26 (L) >=60 mL/min/1.73m2    Patient Fasting for BMP? Patient not present          Current Outpatient Medications:     torsemide 20 MG Oral Tab, Take 0.5 tablets (10 mg total) by mouth daily., Disp: , Rfl:     metoprolol succinate ER 50 MG Oral Tablet 24 Hr, Take 1 tablet (50 mg total) by mouth in the morning and 1 tablet (50 mg total) before bedtime., Disp: 60 tablet, Rfl: 3    lisinopril 5 MG Oral Tab, Take 1 tablet (5 mg total) by mouth daily. (Patient not taking: Reported on 12/3/2024), Disp: 30 tablet, Rfl: 0    acetaminophen 325 MG Oral Tab, Take 2 tablets (650 mg total) by mouth every 6 (six) hours as needed for Pain or Fever., Disp: 30 tablet, Rfl: 0    insulin aspart (NOVOLOG FLEXPEN) 100 Units/mL Subcutaneous Solution Pen-injector, Test blood glucose 3 times daily with meals Inject 1 unit if blood glucose is between 141-180 mg/dL Inject 2 units if blood glucose is between 181-220 mg/dL Inject 3 units if blood glucose is between 221-260 mg/dL Inject 4 units if blood glucose is between 261-300 mg/dL Inject 5 units if blood glucose is between 301-350 mg/dL Call your physician if blood glucose is greater than 351 mg/dL,, Disp: 5 each, Rfl: 3    Blood Glucose Monitoring Suppl (ONETOUCH VERIO FLEX SYSTEM) w/Device Does not apply Kit, Test blood sugar 3 times per day., Disp: 1 kit, Rfl: 0    OneTouch Delica Lancets  33G Does not apply Misc, Test blood sugar 3 times per day., Disp: 100 each, Rfl: 6    pantoprazole 40 MG Oral Tab EC, Take 1 tablet (40 mg total) by mouth every morning before breakfast., Disp: 90 tablet, Rfl: 0    atorvastatin 40 MG Oral Tab, Take 1 tablet (40 mg total) by mouth nightly., Disp: 30 tablet, Rfl: 1    umeclidinium-vilanterol 62.5-25 MCG/ACT Inhalation Aerosol Powder, Breath Activated, Inhale 1 puff into the lungs daily., Disp: 1 each, Rfl: 0    amiodarone 200 MG Oral Tab, Take 1 tablet (200 mg total) by mouth 2 (two) times daily with meals., Disp: 90 tablet, Rfl: 0    Glucose Blood (PRODIGY NO CODING BLOOD GLUC) In Vitro Strip, Use as directed to test blood sugar once daily, Disp: 100 strip, Rfl: 3    Glucose Blood (ONETOUCH VERIO) In Vitro Strip, Test blood sugar 3 times per day., Disp: 100 strip, Rfl: 6    albuterol 108 (90 Base) MCG/ACT Inhalation Aero Soln, Inhale 2 puffs into the lungs every 4 (four) hours as needed for Wheezing., Disp: 1 each, Rfl: 0    Prodigy Lancets 28G Does not apply Misc, Test once daily, Disp: 100 each, Rfl: 3    Lancet Device Does not apply Misc, MEASURE HOME GLUCOSE DAILY --DIRECTED, Disp: 1 each, Rfl: 11    CLOPIDOGREL BISULFATE 75 MG Oral Tab, TAKE 1 TABLET DAILY, Disp: 90 tablet, Rfl: 3    ASPIRIN 81 MG OR TABS, Take 1 tablet (81 mg total) by mouth at bedtime., Disp: , Rfl:     Exam:   General:         Alert, in no apparent distress  HEENT:          no JVD  Lungs:            CTAB                     CV:                  S1, S2 regular  Abdomen:      Non-distended, soft, flat  Extremities:    no LE edema  Neuro:             A&O x 3  Skin:                Pink, warm, dry    Education:  Patient instructed regarding sodium restricted diet, low sodium foods, fluid restriction, daily weights, medication regimen, s/s HF exacerbation and when to call APN/clinic.         [x] BiV/ICD device, Medtronic, unsuccesful attempt at upgrade to BiV  [] CardioMEMs  [x] ARNi/ACEi/ARB,  intolerant to ARNI with low BP  [x] BB  [] MRA, intolerant with hyperkalemia.   [] SGLT2i, intolerant to with diarrhea. May try Farxiga when not hypovolemic.   [] Verquvo, benefits not substantiated with ProBNP of 16,000  [] Corlanor, HR controlled   [] [facility], can consider since unsuccessful attempt at BiV upgrade if a candidate with LVEF 15%.   [] Barostim, ProBNP excludes him       Assessment:   HFrEF, ACC/AHA Stage C, NYHA Class 3. ProBNP 15,726 on 11/23, continues to improve from 16,483 his first visit. ProBNP up to 30,781 in the hospital and remained elevated on discharge. Hypovolemia.  Ischemic CM s/p Medtronic ICD; LVEF 15%. Intolerant to ARNI with low BP. CAD with remote CABG 2006; recent ischemic evaluation done due to VT. Cath 10/24 showed 100% RCA and LAD. Lcx with 90% ostial OM1. LIMA-LAD and SVG-RCA patent. S/P PCI OM1. On statin, ASA and Plavix.    CKD Stage 3 - Hx left renal artery stenosis. Baseline creatinine ~1.9-2.2 mg/dL, worse recently likely related to decreased renal perfusion and dehydration. Metformin and ACE-I stopped during admission in October. ACE-I resumed in November, and now stopped by Nephrology. Follows with Dr. Adorno.   Anemia,  last Hgb 10.6 g/dL; improved from 9.6 g/dL likely concentrated. Last Iron sat 13%, Ferritin 506.   DM Type II, last A1c 6.9% 11/4. Metformin stopped. Intolerant to Jardiance in the past with diarrhea, but will to retry if recommended.   Hx VT with multiple recent shocks requiring admission. New on Amiodarone in October. Attempt at upgrade to BiV ICD unsuccessful. Follows with Dr. Pedroza, saw him on 11/15.  Hx Hyperkalemia, potassium up to 6.1 recently, improved with Lokelma.   Severe COPD with hx tobacco abuse; on Anoro. Follows with Dr. Clemons. To see him in December with PFT's.   AAA s/p EVAR 2008 with small endoleak - Follows with Dr. White.  Bilateral moderate carotid artery disease     Plan:   Decrease Torsemide from 20 mg daily to 10 mg daily.    Continue checking daily weights and BP.  Would consider Farxiga once dehydration improves.   Return to clinic in 2 weeks. Will repeat BMP at that time.   Plan to start checking OptiVol next visit with recent generator change.   F/U with Dr. Rogers and Dr. Clemons on 12/11.  CHF discharge instructions given    I have spent 45 min total time on the day of the encounter, including: preparing to see the patient, obtaining and/or reviewing separately obtained history, performing a medically appropriate examination and/or evaluation, counseling and educating the patient/family caregiver, ordering medication, tests, or procedures, referring and communicating with other health care professionals, documenting clinical information in Epic, independently interpreting results and communicating results to the patient/family caregiver, and care coordination     KARINA Morrison

## 2024-12-03 NOTE — PROGRESS NOTES
Pt. Assessed. No signs or symptoms of shortness of breath, fatigue, chest pain or edema noted. Weight stable at 120.2 lbs. Reviewed current list of patient's allergies and medication; updated the Electronic Medical Record. Pt's Lisinopril dc'd last week when he saw the kidney doctor last week. APN notified. Pt. Unable to do 6MW d/t in wheelchair and still receiving home physical therapy. APN notified.Labs ordered to assess kidney function and drawn by  Lab. Reviewed follow-up appointment and Heart Failure discharge instructions with patient. Patient verbalized an understanding.   6 Minute Walk    Results at Rest                   Ambulatory Results  Unable to perform 6 min walk: Patient is wheelchair bound (receiving home P.T.)                            Recovery Results                   6 Min Walk Results          Fall Risk Assessment  Do you feel unsteady when standing or walking?: Yes  Do you worry about falling?: No  Have you fallen in the past year?: No

## 2024-12-03 NOTE — PATIENT INSTRUCTIONS
Heart Failure Discharge Instructions    Decrease Torsemide to 10mg daily.     Activity: Regular exercise and activity is important for your overall health and to help keep your heart strong and functioning as well as possible.   Walk at a slow to moderate pace for 15-20 minutes 3-5 days per week.     Follow these instructions every day to keep yourself in the Green Zone     The Green Zone means you are feeling well and your symptoms are under control                                    Medications  Take your medication every day as instructed  Do not stop taking your medicine or change the amount you are taking without instructions from your doctor or nurse  Do Not take non-steroidal antiinflammatory drugs such as ibuprofen, aleve, advil, or motrin                                    Diet/Fluids  People with heart failure should eat less sodium (salt) and limit fluid. Sodium attracts water and makes the body hold fluid. This extra fluid makes the heart work harder and can worsen the symptoms of heart failure.     Diet    2000 mg sodium daily  Fluid restriction    64 ounces daily  (8 oz. = 1 cup)                                     Body Weight  Weigh yourself every day before breakfast and write your weight down  Use the same scale and wear about the same amount of clothes each time  A sudden weight increase is due to fluid retention rather than fat                                         Activity  Pace your activities to avoid getting overtired  Take rest periods as needed  Elevate your feet to reduce ankle swelling when resting                             Signs of Worsening Heart Failure    You are entering the Yellow Zone - this is a warning zone    Call your doctor or nurse if you have any of these signs or symptoms:  You gain 2 or more pounds overnight or 3-5 pounds in 3-7 days  You have more trouble breathing  You get more tired with regular activity, or are limiting activity because of shortness of breath or  fatigue  You are short of breath lying down, you need more pillows to breathe comfortably,  or wake up during the night short of breath  You urinate less often during the day and more often at night  You have a bloated feeling, upset stomach, loss of appetite, or your clothes are fitting tighter    GO TO THE EMERGENCY DEPARTMENT or CALL 911 IF:    These are signs you are in the RED ZONE - THIS IS AN EMERGENCY  You have tightness or pain in your chest  You are extremely short of breath or can't catch your breath  You cough up frothy pink mucous  You feel confused or can't think clearly  You are traveling and develop symptoms of worsening heart failure     We respect everyone's time and availability. Please be aware that this is not a walk-in clinic and we require appointments in order to facilitate timely care for all patients. We ask you to arrive 30 minutes before your appointment to allow time for you to check-in and have your bloodwork drawn. Please understand if you are late for your appointment, you may be asked to reschedule. If possible, all attempts will be made to accommodate but realize this is no guarantee that this will always be available. We understand there are extenuating circumstances. If you need to cancel or reschedule your appointment, please call the [facility] within 24 hours at [phone].  Thank you for your cooperation, [facility] Staff.    If you are currently [facility] active, starting July 1st 2024, we will be utilizing [facility] messaging ONLY to confirm your appointment.    IF YOU HAVE QUESTIONS REGARDING YOUR BILL, FEEL FREE TO CONTACT [facility] PATIENT ACCOUNTS -[phone]. IF YOU NEED FINANCIAL ASSISTANCE, PLEASE CALL AN [facility] FINANCIAL COUNSELOR -[phone].             [facility]     [phone]

## 2024-12-12 NOTE — PROGRESS NOTES
[facility] Progress Note    Mingo White is a 79 year old male who presents to clinic for APN assessment and management of chronic systolic heart failure and is functional class 3.     Subjective:  Since his last clinic visit on 12/3; when Torsemide was reduced to 10 mg daily from 20 mg daily;     He saw Dr. Rogers and Amiodarone was reduced to 200 mg daily. He will see him in 2 months (February).     Today, he is accompanied by his wife. He continues to have loss of appetite which he attributes to Amiodarone but he still eats 3 meals a day. He also has a protein drink daily. Weight is up 2 lbs. Home BP's have improved with SBP's 's since he stopped Lisinopril. He is currently receiving home PT with plans for Cardiac rehab after. He notes dyspnea when working with PT or walking too fast. He still notes ongoing fatigue and weakness. He sleeps well. He continues to take MiraLax daily to help with constipation.     OptioVol fluid index is mildly visible and impedance is mildly below reference line that is being established.         He saw Dr. Adorno on 11/26 for CKD. Baseline creatinine about 1.9-2.2 mg/dL. Thought recent worsening renal function related to hypotension and poor appetite. Stopped Lisinopril with plans to repeat labs in 1 week. Follow-up in 4-6 weeks.     Last Hospitalizations/ED visits:    He was back in the ED and admitted from 11/4-11/6 with dyspnea. ProBNP was 30,000 on arrival and discharge. CXR with mild pulmonary vascular congestion. Seen by pulmonary; no clinical signs of infection. Leukocytosis felt to be related to recent steroids. Symptoms improved with diuresis. Lisinopril resumed. Seen by Palliative Care to discuss goals of care; code status updated. ICD felt to still be appropriate even though he doesn't want CPR. Referred to outpatient Residential Palliative. Discharged at 126 lbs.     He was hospitalized 10/19-10/28 after multiple ICD shocks.  Prior to the hospital stay his treatment threshold as reduced due to NSVT occurring below the threshold for therapy. He was started on Amiodarone with loading dose. Noted to be hyperkalemic (given Lokelma) on arrival with worsening renal function in the setting of increased diuretics recently for decompensation. Stay c/b acute respiratory failure prompting pulmonary consult and treatment with IV steroids and Bipap. Seen by Nephrology and gently hydrated. ACE-I stopped. 4 doses of IV Ferric given.   Underwent generator change. Attempt at upgrade to CRT-D unsuccessful. Underwent angiogram and PCI/NORRIS to OM. Echo with LVEF 15-20%, Grade I DD, low normal RV function, mild to MR, Discharged at 134 lbs. To follow up with Nephrology and Pulmonary after discharge.     Review of Systems   Constitutional: Positive for decreased appetite, malaise/fatigue and weight loss.   Cardiovascular:  Positive for dyspnea on exertion. Negative for leg swelling.   Respiratory:  Positive for shortness of breath.    Gastrointestinal:  Positive for constipation.   Neurological:  Positive for weakness.       HISTORY:  Past Medical History:    Anesthesia complication    was \"out of it\" for a long time after sedation with previous colonoscopies    Back problem    Carotid artery disease (HCC)    Congestive heart disease (HCC)    CORONARY ARTERY DISEASE    Coronary atherosclerosis    DIABETES    Diabetes (HCC)    Gastric ulcer    HEART ATTACK    age 61    High blood pressure    HYPERLIPIDEMIA    Sinus drainage    with weather changes      Past Surgical History:   Procedure Laterality Date    Cardiac defibrillator placement  2007    Medtronic dual chamber ICD, not pacemaker dependent    Colonoscopy  4/6/11 - DANNIE Espinoza    adenoma, fair prep, hemorrhoids, repeat 2013-14.    Colonoscopy  4/10/2014    DANNIE Espinoza. 6 adenomas, hemorrhoids, repeat 2017 w/ split 4L prep.     Colonoscopy  07/24/2017    Diverticulosis, Hemorrhoids.  Repeat PRN    Colonoscopy N/A  7/24/2017    Procedure: COLONOSCOPY;  Surgeon: Brad Tapia MD;  Location:  ENDOSCOPY    Open heart surgery (pbp)      Double bypass - done in [facility]    Other      repair AAA    Upper gi endoscopy performed  4/6/11 - DANNIE BLACKMAN, no H pylori.       Family History   Problem Relation Age of Onset    Heart Disorder Father     Heart Disorder Mother     Diabetes Maternal Grandmother       Social History     Socioeconomic History    Marital status:    Occupational History    Occupation: chemical technologist     Comment: retire age 54   Tobacco Use    Smoking status: Every Day     Current packs/day: 0.50     Average packs/day: 0.5 packs/day for 50.0 years (25.0 ttl pk-yrs)     Types: Cigarettes    Smokeless tobacco: Never   Vaping Use    Vaping status: Never Used   Substance and Sexual Activity    Alcohol use: No     Alcohol/week: 0.0 standard drinks of alcohol     Comment: quit 2006    Drug use: No   Other Topics Concern     Service Yes     Comment: [employer] - [employer] exposure    Blood Transfusions No    Caffeine Concern No    Occupational Exposure No    Hobby Hazards No    Sleep Concern No    Stress Concern No    Weight Concern No    Special Diet Yes    Back Care Yes    Exercise Yes    Seat Belt Yes   Social History Narrative    Likes to do crossword puzzles    Active with grand kids and their sports    Socializes with friends     Social Drivers of Health     Food Insecurity: No Food Insecurity (11/5/2024)    Food Insecurity     Food Insecurity: Never true   Transportation Needs: No Transportation Needs (11/5/2024)    Transportation Needs     Lack of Transportation: No   Housing Stability: Low Risk  (11/5/2024)    Housing Stability     Housing Instability: No           Objective:    Cardiac Rhythm: Ventricular paced    /77 (BP Location: Left arm)   Pulse 60   Resp 21   Wt 122 lb (55.3 kg)   SpO2 98%   BMI 19.11 kg/m²     Wt Readings from Last 6 Encounters:   12/17/24  122 lb (55.3 kg)   12/03/24 120 lb 3.2 oz (54.5 kg)   11/25/24 122 lb 9.6 oz (55.6 kg)   11/15/24 124 lb 3.2 oz (56.3 kg)   11/27/24 120 lb (54.4 kg)   10/28/24 134 lb 11.2 oz (61.1 kg)            Recent Results (from the past 24 hours)   Basic Metabolic Panel (8)    Collection Time: 12/17/24  8:42 AM   Result Value Ref Range    Glucose 114 (H) 70 - 99 mg/dL    Sodium 141 136 - 145 mmol/L    Potassium 5.6 (H) 3.5 - 5.1 mmol/L    Chloride 109 98 - 112 mmol/L    CO2 24.0 21.0 - 32.0 mmol/L    Anion Gap 8 0 - 18 mmol/L    BUN 37 (H) 9 - 23 mg/dL    Creatinine 2.24 (H) 0.70 - 1.30 mg/dL    Calcium, Total 9.4 8.7 - 10.4 mg/dL    Calculated Osmolality 302 (H) 275 - 295 mOsm/kg    eGFR-Cr 29 (L) >=60 mL/min/1.73m2    Patient Fasting for BMP? Patient not present            Current Outpatient Medications:     acetaminophen 325 MG Oral Tab, Take 2 tablets (650 mg total) by mouth every 6 (six) hours as needed for Pain or Fever., Disp: 30 tablet, Rfl: 0    atorvastatin 40 MG Oral Tab, Take 1 tablet (40 mg total) by mouth nightly., Disp: 30 tablet, Rfl: 1    amiodarone 200 MG Oral Tab, Take 1 tablet (200 mg total) by mouth 2 (two) times daily with meals. (Patient taking differently: Take 1 tablet (200 mg total) by mouth daily.), Disp: 90 tablet, Rfl: 0    CLOPIDOGREL BISULFATE 75 MG Oral Tab, TAKE 1 TABLET DAILY, Disp: 90 tablet, Rfl: 3    ASPIRIN 81 MG OR TABS, Take 1 tablet (81 mg total) by mouth at bedtime., Disp: , Rfl:     torsemide 20 MG Oral Tab, Take 0.5 tablets (10 mg total) by mouth daily., Disp: , Rfl:     metoprolol succinate ER 50 MG Oral Tablet 24 Hr, Take 1 tablet (50 mg total) by mouth in the morning and 1 tablet (50 mg total) before bedtime., Disp: 60 tablet, Rfl: 3    insulin aspart (NOVOLOG FLEXPEN) 100 Units/mL Subcutaneous Solution Pen-injector, Test blood glucose 3 times daily with meals Inject 1 unit if blood glucose is between 141-180 mg/dL Inject 2 units if blood glucose is between 181-220 mg/dL Inject 3  units if blood glucose is between 221-260 mg/dL Inject 4 units if blood glucose is between 261-300 mg/dL Inject 5 units if blood glucose is between 301-350 mg/dL Call your physician if blood glucose is greater than 351 mg/dL,, Disp: 5 each, Rfl: 3    Blood Glucose Monitoring Suppl (ONETOUCH VERIO FLEX SYSTEM) w/Device Does not apply Kit, Test blood sugar 3 times per day., Disp: 1 kit, Rfl: 0    OneTouch Delica Lancets 33G Does not apply Misc, Test blood sugar 3 times per day., Disp: 100 each, Rfl: 6    pantoprazole 40 MG Oral Tab EC, Take 1 tablet (40 mg total) by mouth every morning before breakfast., Disp: 90 tablet, Rfl: 0    umeclidinium-vilanterol 62.5-25 MCG/ACT Inhalation Aerosol Powder, Breath Activated, Inhale 1 puff into the lungs daily., Disp: 1 each, Rfl: 0    Glucose Blood (PRODIGY NO CODING BLOOD GLUC) In Vitro Strip, Use as directed to test blood sugar once daily, Disp: 100 strip, Rfl: 3    Glucose Blood (ONETOUCH VERIO) In Vitro Strip, Test blood sugar 3 times per day., Disp: 100 strip, Rfl: 6    albuterol 108 (90 Base) MCG/ACT Inhalation Aero Soln, Inhale 2 puffs into the lungs every 4 (four) hours as needed for Wheezing., Disp: 1 each, Rfl: 0    Prodigy Lancets 28G Does not apply Misc, Test once daily, Disp: 100 each, Rfl: 3    Lancet Device Does not apply Misc, MEASURE HOME GLUCOSE DAILY --DIRECTED, Disp: 1 each, Rfl: 11    Exam:   General:         Alert, in no apparent distress  HEENT:          no JVD  Lungs:            CTAB                     CV:                  S1, S2 regular  Abdomen:      Non-distended, soft, flat  Extremities:    no LE edema  Neuro:             A&O x 3  Skin:                Pink, warm, dry    Education:  Patient instructed regarding sodium restricted diet, low sodium foods, fluid restriction, daily weights, medication regimen, s/s HF exacerbation and when to call APN/clinic.         [x] BiV/ICD device, Medtronic, unsuccesful attempt at upgrade to BiV  [] CardioMEMs  [x]  ARNi/ACEi/ARB, intolerant to ARNI with low BP, ACE recently stopped due to low BP   [x] BB  [] MRA, intolerant with hyperkalemia.   [] SGLT2i, intolerant to with diarrhea. May try Farxiga when not hypovolemic.   [] Verquvo, benefits not substantiated with ProBNP of 16,000  [] Corlanor, HR controlled   [] CCM, can consider since unsuccessful attempt at BiV upgrade if a candidate with LVEF 15%.   [] Barostim, ProBNP excludes him     Assessment:   HFrEF, ACC/AHA Stage C, NYHA Class 3. ProBNP 15,726 on 11/23, continues to improve from 16,483 his first visit. ProBNP up to 30,781 in the hospital and remained elevated on discharge. Volume status improving but still mildly hypovolemic.   Ischemic CM s/p Medtronic ICD; LVEF 15%. Intolerant to ARNI with low BP. CAD with remote CABG 2006; recent ischemic evaluation done due to VT. Cath 10/24 showed 100% RCA and LAD. Lcx with 90% ostial OM1. LIMA-LAD and SVG-RCA patent. S/P PCI OM1. On statin, ASA and Plavix.    CKD Stage 3 - Hx left renal artery stenosis. Baseline creatinine ~1.9-2.2 mg/dL, worse recently likely related to decreased renal perfusion and dehydration. Metformin and ACE-I stopped during admission in October. ACE-I resumed in November, and now stopped by Nephrology. Creatinine 2.1- 2.2 today and improving. Follows with Dr. Adorno.   Anemia,  last Hgb 10.6 g/dL; improved from 9.6 g/dL likely concentrated. Last Iron sat 13%, Ferritin 506.   DM Type II, last A1c 6.9% 11/4. Metformin stopped. Intolerant to Jardiance in the past with diarrhea, but will to retry if recommended.   Hx VT with multiple recent shocks requiring admission. New on Amiodarone in October, dose reduced 12/10 to 200 mg daily per Dr. Rogers. Attempt at upgrade to BiV ICD unsuccessful. Follows with Dr. Pedroza, saw him on 11/15.  Hx Hyperkalemia, potassium up to 6.1 recently, improved with Lokelma.   Severe COPD with hx tobacco abuse; on Anoro. Follows with Dr. Clemons. To see him in December  with PFT's.   AAA s/p EVAR 2008 with small endoleak - Follows with Dr. White.  Bilateral moderate carotid artery disease     Plan:     Continue checking daily weights and BP.  Reduce Torsemide to 10 mg on Monday, Wednesday, Thursday and Friday from daily.    Would consider Farxiga once dehydration improves.   Return to clinic in 2 weeks.  F/U with Dr. Rogers in February.   CHF discharge instructions given    I have spent 45 min total time on the day of the encounter, including: preparing to see the patient, obtaining and/or reviewing separately obtained history, performing a medically appropriate examination and/or evaluation, counseling and educating the patient/family caregiver, ordering medication, tests, or procedures, referring and communicating with other health care professionals, documenting clinical information in Epic, independently interpreting results and communicating results to the patient/family caregiver, and care coordination        Melodie Silvestre NP

## 2024-12-17 ENCOUNTER — HOSPITAL ENCOUNTER (OUTPATIENT)
Dept: CARDIOLOGY CLINIC | Facility: HOSPITAL | Age: 79
Discharge: HOME | End: 2024-12-17
Attending: NURSE PRACTITIONER
Payer: MEDICARE

## 2024-12-17 ENCOUNTER — HOSPITAL ENCOUNTER (OUTPATIENT)
Dept: LAB | Facility: HOSPITAL | Age: 79
Discharge: HOME | End: 2024-12-17
Attending: NURSE PRACTITIONER
Payer: MEDICARE

## 2024-12-17 VITALS
WEIGHT: 122 LBS | RESPIRATION RATE: 18 BRPM | OXYGEN SATURATION: 99 % | SYSTOLIC BLOOD PRESSURE: 98 MMHG | BODY MASS INDEX: 19 KG/M2 | HEART RATE: 59 BPM | DIASTOLIC BLOOD PRESSURE: 46 MMHG

## 2024-12-17 DIAGNOSIS — I42.9 CARDIOMYOPATHY, UNSPECIFIED TYPE (HCC): ICD-10-CM

## 2024-12-17 DIAGNOSIS — I50.22 CHRONIC SYSTOLIC CONGESTIVE HEART FAILURE (HCC): ICD-10-CM

## 2024-12-17 DIAGNOSIS — I25.9 CHRONIC ISCHEMIC HEART DISEASE: ICD-10-CM

## 2024-12-17 DIAGNOSIS — I25.5 ISCHEMIC CARDIOMYOPATHY WITH IMPLANTABLE CARDIOVERTER-DEFIBRILLATOR (ICD): ICD-10-CM

## 2024-12-17 DIAGNOSIS — Z95.810 ISCHEMIC CARDIOMYOPATHY WITH IMPLANTABLE CARDIOVERTER-DEFIBRILLATOR (ICD): ICD-10-CM

## 2024-12-17 DIAGNOSIS — N18.4 CKD (CHRONIC KIDNEY DISEASE) STAGE 4, GFR 15-29 ML/MIN (HCC): ICD-10-CM

## 2024-12-17 DIAGNOSIS — I50.32 CHRONIC HEART FAILURE WITH PRESERVED EJECTION FRACTION (HFPEF) (HCC): Primary | ICD-10-CM

## 2024-12-17 DIAGNOSIS — E87.5 HYPERKALEMIA: Primary | ICD-10-CM

## 2024-12-17 DIAGNOSIS — J44.1 COPD EXACERBATION (HCC): ICD-10-CM

## 2024-12-17 LAB
ANION GAP SERPL CALC-SCNC: 7 MMOL/L (ref 0–18)
ANION GAP SERPL CALC-SCNC: 8 MMOL/L (ref 0–18)
BUN BLD-MCNC: 37 MG/DL (ref 9–23)
BUN BLD-MCNC: 44 MG/DL (ref 9–23)
CALCIUM BLD-MCNC: 9.3 MG/DL (ref 8.7–10.4)
CALCIUM BLD-MCNC: 9.4 MG/DL (ref 8.7–10.4)
CHLORIDE SERPL-SCNC: 108 MMOL/L (ref 98–112)
CHLORIDE SERPL-SCNC: 109 MMOL/L (ref 98–112)
CO2 SERPL-SCNC: 24 MMOL/L (ref 21–32)
CO2 SERPL-SCNC: 26 MMOL/L (ref 21–32)
CREAT BLD-MCNC: 2.16 MG/DL
CREAT BLD-MCNC: 2.24 MG/DL
EGFRCR SERPLBLD CKD-EPI 2021: 29 ML/MIN/1.73M2 (ref 60–?)
EGFRCR SERPLBLD CKD-EPI 2021: 30 ML/MIN/1.73M2 (ref 60–?)
FASTING STATUS PATIENT QL REPORTED: NO
GLUCOSE BLD-MCNC: 106 MG/DL (ref 70–99)
GLUCOSE BLD-MCNC: 114 MG/DL (ref 70–99)
OSMOLALITY SERPL CALC.SUM OF ELEC: 302 MOSM/KG (ref 275–295)
OSMOLALITY SERPL CALC.SUM OF ELEC: 304 MOSM/KG (ref 275–295)
POTASSIUM SERPL-SCNC: 4.7 MMOL/L (ref 3.5–5.1)
POTASSIUM SERPL-SCNC: 5.6 MMOL/L (ref 3.5–5.1)
SODIUM SERPL-SCNC: 141 MMOL/L (ref 136–145)
SODIUM SERPL-SCNC: 141 MMOL/L (ref 136–145)

## 2024-12-17 PROCEDURE — 36415 COLL VENOUS BLD VENIPUNCTURE: CPT | Performed by: NURSE PRACTITIONER

## 2024-12-17 PROCEDURE — 80048 BASIC METABOLIC PNL TOTAL CA: CPT | Performed by: NURSE PRACTITIONER

## 2024-12-17 PROCEDURE — 99215 OFFICE O/P EST HI 40 MIN: CPT | Performed by: NURSE PRACTITIONER

## 2024-12-17 RX ORDER — TORSEMIDE 20 MG/1
TABLET ORAL
COMMUNITY
Start: 2024-12-17

## 2024-12-17 RX ORDER — AMIODARONE HYDROCHLORIDE 200 MG/1
200 TABLET ORAL DAILY
COMMUNITY
Start: 2024-12-17

## 2024-12-17 RX ORDER — TORSEMIDE 20 MG/1
10 TABLET ORAL EVERY OTHER DAY
COMMUNITY
Start: 2024-12-17 | End: 2024-12-17

## 2024-12-17 NOTE — PROGRESS NOTES
Pt. Assessed. No signs or symptoms of shortness of breath, fatigue, chest pain or edema noted. Weight stable at 122 lbs. Reviewed current list of patient's allergies and medication; updated the Electronic Medical Record. Labs ordered to assess kidney function and drawn by  Lab. Reviewed follow-up appointment and Heart Failure discharge instructions with patient. Patient verbalized an understanding.   K hemolyzed, redrawn in clinic    Will rtc in 2-3 weeks- labs in 1 week

## 2024-12-17 NOTE — PATIENT INSTRUCTIONS
Heart Failure Discharge Instructions    Decrease Torsemide to 10 mg Monday, Wednesday, Friday and Sunday.  Have labs the week of New Years.       Activity: Regular exercise and activity is important for your overall health and to help keep your heart strong and functioning as well as possible.   Walk at a slow to moderate pace for 15-20 minutes 3-5 days per week.     Follow these instructions every day to keep yourself in the Green Zone     The Green Zone means you are feeling well and your symptoms are under control                                    Medications  Take your medication every day as instructed  Do not stop taking your medicine or change the amount you are taking without instructions from your doctor or nurse  Do Not take non-steroidal antiinflammatory drugs such as ibuprofen, aleve, advil, or motrin                                    Diet/Fluids  People with heart failure should eat less sodium (salt) and limit fluid. Sodium attracts water and makes the body hold fluid. This extra fluid makes the heart work harder and can worsen the symptoms of heart failure.     Diet    2000 mg sodium daily  Fluid restriction    64 ounces daily  (8 oz. = 1 cup)                                     Body Weight  Weigh yourself every day before breakfast and write your weight down  Use the same scale and wear about the same amount of clothes each time  A sudden weight increase is due to fluid retention rather than fat                                         Activity  Pace your activities to avoid getting overtired  Take rest periods as needed  Elevate your feet to reduce ankle swelling when resting                             Signs of Worsening Heart Failure    You are entering the Yellow Zone - this is a warning zone    Call your doctor or nurse if you have any of these signs or symptoms:  You gain 2 or more pounds overnight or 3-5 pounds in 3-7 days  You have more trouble breathing  You get more tired with regular  activity, or are limiting activity because of shortness of breath or fatigue  You are short of breath lying down, you need more pillows to breathe comfortably,  or wake up during the night short of breath  You urinate less often during the day and more often at night  You have a bloated feeling, upset stomach, loss of appetite, or your clothes are fitting tighter    GO TO THE EMERGENCY DEPARTMENT or CALL 911 IF:    These are signs you are in the RED ZONE - THIS IS AN EMERGENCY  You have tightness or pain in your chest  You are extremely short of breath or can't catch your breath  You cough up frothy pink mucous  You feel confused or can't think clearly  You are traveling and develop symptoms of worsening heart failure     We respect everyone's time and availability. Please be aware that this is not a walk-in clinic and we require appointments in order to facilitate timely care for all patients. We ask you to arrive 30 minutes before your appointment to allow time for you to check-in and have your bloodwork drawn. Please understand if you are late for your appointment, you may be asked to reschedule. If possible, all attempts will be made to accommodate but realize this is no guarantee that this will always be available. We understand there are extenuating circumstances. If you need to cancel or reschedule your appointment, please call the [facility] within 24 hours at [phone].  Thank you for your cooperation, [facility] Staff.    If you are currently [employer] active, starting July 1st 2024, we will be utilizing [employer] messaging ONLY to confirm your appointment.    IF YOU HAVE QUESTIONS REGARDING YOUR BILL, FEEL FREE TO CONTACT [facility] PATIENT ACCOUNTS -[phone]. IF YOU NEED FINANCIAL ASSISTANCE, PLEASE CALL AN [facility] FINANCIAL COUNSELOR -[phone].             [facility]     [phone]

## 2024-12-23 ENCOUNTER — TELEPHONE (OUTPATIENT)
Dept: CARDIOLOGY CLINIC | Facility: HOSPITAL | Age: 79
End: 2024-12-23

## 2024-12-23 NOTE — TELEPHONE ENCOUNTER
Mingo called to say his weight went up 2 lbs Saturday (120 lbs) into Sunday (122 lbs). It came back down 2 lbs today  (120 lbs ). He was wondering if he should increase his diuretic from M-W-F-SUN to daily? Reviewed with APN, advised to continue alternate day dosing for now. Continue to monitor weight and call back if weight continues to increase and not come back down.

## 2024-12-30 ENCOUNTER — LAB ENCOUNTER (OUTPATIENT)
Dept: LAB | Age: 79
End: 2024-12-30
Attending: NURSE PRACTITIONER
Payer: MEDICARE

## 2024-12-30 DIAGNOSIS — E87.5 HYPERKALEMIA: ICD-10-CM

## 2024-12-30 DIAGNOSIS — N18.4 CKD (CHRONIC KIDNEY DISEASE) STAGE 4, GFR 15-29 ML/MIN (HCC): ICD-10-CM

## 2024-12-30 DIAGNOSIS — Z95.810 ISCHEMIC CARDIOMYOPATHY WITH IMPLANTABLE CARDIOVERTER-DEFIBRILLATOR (ICD): ICD-10-CM

## 2024-12-30 DIAGNOSIS — I25.5 ISCHEMIC CARDIOMYOPATHY WITH IMPLANTABLE CARDIOVERTER-DEFIBRILLATOR (ICD): ICD-10-CM

## 2024-12-30 LAB
ANION GAP SERPL CALC-SCNC: 5 MMOL/L (ref 0–18)
BUN BLD-MCNC: 54 MG/DL (ref 9–23)
CALCIUM BLD-MCNC: 9.2 MG/DL (ref 8.7–10.4)
CHLORIDE SERPL-SCNC: 109 MMOL/L (ref 98–112)
CO2 SERPL-SCNC: 25 MMOL/L (ref 21–32)
CREAT BLD-MCNC: 2.15 MG/DL
EGFRCR SERPLBLD CKD-EPI 2021: 30 ML/MIN/1.73M2 (ref 60–?)
FASTING STATUS PATIENT QL REPORTED: YES
GLUCOSE BLD-MCNC: 149 MG/DL (ref 70–99)
OSMOLALITY SERPL CALC.SUM OF ELEC: 306 MOSM/KG (ref 275–295)
POTASSIUM SERPL-SCNC: 5.3 MMOL/L (ref 3.5–5.1)
SODIUM SERPL-SCNC: 139 MMOL/L (ref 136–145)

## 2024-12-30 PROCEDURE — 80048 BASIC METABOLIC PNL TOTAL CA: CPT

## 2024-12-30 PROCEDURE — 36415 COLL VENOUS BLD VENIPUNCTURE: CPT

## 2024-12-30 NOTE — PROGRESS NOTES
[facility] Progress Note    Mingo White is a 79 year old male who presents to clinic for APN assessment and management of chronic systolic heart failure and is functional class 3.     Subjective:  Since his last clinic visit on 12/17; when Torsemide was reduced to 10 mg Monday, Wednesday, Thursday and Friday from daily; he contacted the clinic on the 23rd to report weight gain of 2 lbs over 3 days. I recommended he continue to monitor daily weights and not increase Torsemide to daily for now. He saw Dr. Adorno 1/6; advised to elevate legs. Consideration given to adding low dose ACE-I for cardiac/anti-proteinuric. He will see him in 3 months.     Today, he is accompanied by his wife. He continues to have loss of appetite which he attributes to Amiodarone. He has a protein drink about 3x per week not daily d/t the potassium in it. Weight is up 3 lbs on our scale and has been gradually going up at home. Recent home weights have been ~118-122 lbs. Home BP's have been ~90/50's. He is currently receiving home PT with plans for Cardiac rehab after. He endorsed worsening dyspnea and leg weakness in the last week after using weights on his legs. Pedal and ankle swelling has been worse. He continues to stay up late watching shows and takes a long nap during the day. His energy levels wane by the evening. On Rohit Quiana he was doing well and had Chinese food.     OptioVol fluid index is mildly visible and impedance suggest he is hypervolemic.              Last Hospitalizations/ED visits:    He was back in the ED and admitted from 11/4-11/6 with dyspnea. ProBNP was 30,000 on arrival and discharge. CXR with mild pulmonary vascular congestion. Seen by pulmonary; no clinical signs of infection. Leukocytosis felt to be related to recent steroids. Symptoms improved with diuresis. Lisinopril resumed. Seen by Palliative Care to discuss goals of care; code status updated. ICD felt to still be  appropriate even though he doesn't want CPR. Referred to outpatient Residential Palliative. Discharged at 126 lbs.     He was hospitalized 10/19-10/28 after multiple ICD shocks. Prior to the hospital stay his treatment threshold as reduced due to NSVT occurring below the threshold for therapy. He was started on Amiodarone with loading dose. Noted to be hyperkalemic (given Lokelma) on arrival with worsening renal function in the setting of increased diuretics recently for decompensation. Stay c/b acute respiratory failure prompting pulmonary consult and treatment with IV steroids and Bipap. Seen by Nephrology and gently hydrated. ACE-I stopped. 4 doses of IV Ferric given.   Underwent generator change. Attempt at upgrade to CRT-D unsuccessful. Underwent angiogram and PCI/NORRIS to OM. Echo with LVEF 15-20%, Grade I DD, low normal RV function, mild to MR, Discharged at 134 lbs. To follow up with Nephrology and Pulmonary after discharge.     Review of Systems   Constitutional: Positive for decreased appetite, malaise/fatigue and weight gain.   Cardiovascular:  Positive for dyspnea on exertion. Negative for leg swelling.   Respiratory:  Positive for shortness of breath.    Gastrointestinal:  Positive for constipation (improved).   Neurological:  Positive for weakness.     HISTORY:  Past Medical History:    Anesthesia complication    was \"out of it\" for a long time after sedation with previous colonoscopies    Back problem    Carotid artery disease (HCC)    Congestive heart disease (HCC)    CORONARY ARTERY DISEASE    Coronary atherosclerosis    DIABETES    Diabetes (HCC)    Gastric ulcer    HEART ATTACK    age 61    High blood pressure    HYPERLIPIDEMIA    Sinus drainage    with weather changes      Past Surgical History:   Procedure Laterality Date    Cardiac defibrillator placement  2007    Medtronic dual chamber ICD, not pacemaker dependent    Colonoscopy  4/6/11 - DANNIE Espinoza    adenoma, fair prep, hemorrhoids, repeat  2013-14.    Colonoscopy  4/10/2014    DANNIE Espinoza. 6 adenomas, hemorrhoids, repeat 2017 w/ split 4L prep.     Colonoscopy  07/24/2017    Diverticulosis, Hemorrhoids.  Repeat PRN    Colonoscopy N/A 7/24/2017    Procedure: COLONOSCOPY;  Surgeon: Brad Tapia MD;  Location:  ENDOSCOPY    Open heart surgery (pbp)      Double bypass - done in [facility]    Other      repair AAA    Upper gi endoscopy performed  4/6/11 - DANNIE Espinoza    small , no H pylori.       Family History   Problem Relation Age of Onset    Heart Disorder Father     Heart Disorder Mother     Diabetes Maternal Grandmother       Social History     Socioeconomic History    Marital status:    Occupational History    Occupation: chemical technologist     Comment: retire age 54   Tobacco Use    Smoking status: Every Day     Current packs/day: 0.50     Average packs/day: 0.5 packs/day for 50.0 years (25.0 ttl pk-yrs)     Types: Cigarettes    Smokeless tobacco: Never   Vaping Use    Vaping status: Never Used   Substance and Sexual Activity    Alcohol use: No     Alcohol/week: 0.0 standard drinks of alcohol     Comment: quit 2006    Drug use: No   Other Topics Concern     Service Yes     Comment: [employer] - [employer] exposure    Blood Transfusions No    Caffeine Concern No    Occupational Exposure No    Hobby Hazards No    Sleep Concern No    Stress Concern No    Weight Concern No    Special Diet Yes    Back Care Yes    Exercise Yes    Seat Belt Yes   Social History Narrative    Likes to do crossword puzzles    Active with grand kids and their sports    Socializes with friends     Social Drivers of Health     Food Insecurity: No Food Insecurity (11/5/2024)    Food Insecurity     Food Insecurity: Never true   Transportation Needs: No Transportation Needs (11/5/2024)    Transportation Needs     Lack of Transportation: No   Housing Stability: Low Risk  (11/5/2024)    Housing Stability     Housing Instability: No           Objective:     BP  103/47   Pulse 59   Resp 20   Wt 125 lb 9.6 oz (57 kg)   SpO2 95%   BMI 19.67 kg/m²     Wt Readings from Last 6 Encounters:   01/08/25 125 lb 9.6 oz (57 kg)   12/17/24 122 lb (55.3 kg)   12/03/24 120 lb 3.2 oz (54.5 kg)   11/25/24 122 lb 9.6 oz (55.6 kg)   11/15/24 124 lb 3.2 oz (56.3 kg)   11/27/24 120 lb (54.4 kg)        Recent Results (from the past 24 hours)   Basic Metabolic Panel (8)    Collection Time: 01/08/25  7:53 AM   Result Value Ref Range    Glucose 163 (H) 70 - 99 mg/dL    Sodium 139 136 - 145 mmol/L    Potassium 4.7 3.5 - 5.1 mmol/L    Chloride 109 98 - 112 mmol/L    CO2 26.0 21.0 - 32.0 mmol/L    Anion Gap 4 0 - 18 mmol/L    BUN 42 (H) 9 - 23 mg/dL    Creatinine 2.09 (H) 0.70 - 1.30 mg/dL    Calcium, Total 8.9 8.7 - 10.4 mg/dL    Calculated Osmolality 302 (H) 275 - 295 mOsm/kg    eGFR-Cr 31 (L) >=60 mL/min/1.73m2    Patient Fasting for BMP? No        Current Outpatient Medications:     torsemide 20 MG Oral Tab, Take 0.5 tablets (10 mg total) by mouth daily., Disp: 90 tablet, Rfl: 0    amiodarone 200 MG Oral Tab, Take 1 tablet (200 mg total) by mouth daily., Disp: , Rfl:     metoprolol succinate ER 50 MG Oral Tablet 24 Hr, Take 1 tablet (50 mg total) by mouth in the morning and 1 tablet (50 mg total) before bedtime., Disp: 60 tablet, Rfl: 3    acetaminophen 325 MG Oral Tab, Take 2 tablets (650 mg total) by mouth every 6 (six) hours as needed for Pain or Fever., Disp: 30 tablet, Rfl: 0    insulin aspart (NOVOLOG FLEXPEN) 100 Units/mL Subcutaneous Solution Pen-injector, Test blood glucose 3 times daily with meals Inject 1 unit if blood glucose is between 141-180 mg/dL Inject 2 units if blood glucose is between 181-220 mg/dL Inject 3 units if blood glucose is between 221-260 mg/dL Inject 4 units if blood glucose is between 261-300 mg/dL Inject 5 units if blood glucose is between 301-350 mg/dL Call your physician if blood glucose is greater than 351 mg/dL,, Disp: 5 each, Rfl: 3    Blood Glucose  Monitoring Suppl (ONETOUCH VERIO FLEX SYSTEM) w/Device Does not apply Kit, Test blood sugar 3 times per day., Disp: 1 kit, Rfl: 0    OneTouch Delica Lancets 33G Does not apply Misc, Test blood sugar 3 times per day., Disp: 100 each, Rfl: 6    pantoprazole 40 MG Oral Tab EC, Take 1 tablet (40 mg total) by mouth every morning before breakfast., Disp: 90 tablet, Rfl: 0    atorvastatin 40 MG Oral Tab, Take 1 tablet (40 mg total) by mouth nightly., Disp: 30 tablet, Rfl: 1    umeclidinium-vilanterol 62.5-25 MCG/ACT Inhalation Aerosol Powder, Breath Activated, Inhale 1 puff into the lungs daily., Disp: 1 each, Rfl: 0    Glucose Blood (PRODIGY NO CODING BLOOD GLUC) In Vitro Strip, Use as directed to test blood sugar once daily, Disp: 100 strip, Rfl: 3    Glucose Blood (ONETOUCH VERIO) In Vitro Strip, Test blood sugar 3 times per day., Disp: 100 strip, Rfl: 6    albuterol 108 (90 Base) MCG/ACT Inhalation Aero Soln, Inhale 2 puffs into the lungs every 4 (four) hours as needed for Wheezing., Disp: 1 each, Rfl: 0    Prodigy Lancets 28G Does not apply Misc, Test once daily, Disp: 100 each, Rfl: 3    Lancet Device Does not apply Misc, MEASURE HOME GLUCOSE DAILY --DIRECTED, Disp: 1 each, Rfl: 11    CLOPIDOGREL BISULFATE 75 MG Oral Tab, TAKE 1 TABLET DAILY, Disp: 90 tablet, Rfl: 3    ASPIRIN 81 MG OR TABS, Take 1 tablet (81 mg total) by mouth at bedtime., Disp: , Rfl:     Exam:   General:         Alert, in no apparent distress  HEENT:          no JVD  Lungs:            bibasilar crackles                      CV:                  S1, S2 regular  Abdomen:      Non-distended, soft, flat  Extremities:    bilateral LE edema L>R +1 pedal/ankle   Neuro:             A&O x 3  Skin:                Pink, warm, dry    Education:  Patient instructed regarding sodium restricted diet, low sodium foods, fluid restriction, daily weights, medication regimen, s/s HF exacerbation and when to call APN/clinic.    [x] BiV/ICD device, Medtronic,  unsuccesful attempt at upgrade to BiV  [] CardioMEMs  [x] ARNi/ACEi/ARB, intolerant to ARNI with low BP, ACE recently stopped due to low BP   [x] BB  [] MRA, intolerant with hyperkalemia.   [] SGLT2i, intolerant to with diarrhea. May try Farxiga when not hypovolemic.   [] Verquvo, benefits not substantiated with ProBNP of 16,000  [] Corlanor, HR controlled   [] CCM, can consider since unsuccessful attempt at BiV upgrade if a candidate with LVEF 15%.   [] Barostim, ProBNP excludes him     Assessment:   HFrEF, ACC/AHA Stage C, NYHA Class 3. ProBNP 15,726 on 11/23, improved from 16,483 his first visit. ProBNP up to 30,781 in the hospital and remained elevated on discharge. OptiVol and impedance suggestive of fluid retention. Hypervolemic.   Ischemic CM s/p Medtronic ICD; LVEF 15%.   CAD with remote CABG 2006; recent ischemic evaluation done due to VT. Cath 10/24 showed 100% RCA and LAD. Lcx with 90% ostial OM1. LIMA-LAD and SVG-RCA patent. S/P PCI OM1. On statin, ASA and Plavix.    CKD Stage 3 - Hx left renal artery stenosis. Baseline creatinine ~1.9-2.2 mg/dL, worse recently likely related to decreased renal perfusion and dehydration. Metformin and ACE-I stopped during admission in October. ACE-I resumed in November, and now stopped by Nephrology. Creatinine  2.09 mg/dL today. Follows with Dr. Adorno.   Anemia,  last Hgb 10.6 g/dL; improved from 9.6 g/dL likely concentrated since dehydrated. Last Iron sat 13%, Ferritin 506.   DM Type II, last A1c 6.9% 11/4. Metformin stopped. Intolerant to Jardiance in the past with diarrhea, but will to retry if recommended.   Hx VT with multiple recent shocks requiring admission. New on Amiodarone in October, dose reduced 12/10 to 200 mg daily per Dr. Rogers. Attempt at upgrade to BiV ICD unsuccessful. Follows with Dr. Pedroza, saw him on 11/15.  Hx Hyperkalemia, potassium up to 6.1 recently, improved with Lokelma.   Severe COPD with hx tobacco abuse; on Anoro. Follows with   Deonte. To see him in December with PFT's.   AAA s/p EVAR 2008 with small endoleak - Follows with Dr. White.  Bilateral moderate carotid artery disease     Plan:     Continue checking daily weights and BP. Encouraged to contact us with weight gain.   Advised to take an extra Torsemide 10 mg today and starting tomorrow increase dose of Torsemide back to 10 mg daily from 4x per week.   Continue PT, plan for Cardiac rehab after.   Consider adding Farxiga next visit.    Return to clinic in 2 weeks.   F/U with Dr. Adorno in 3 months.   F/U with Dr. Rogers in February.   CHF discharge instructions given    I have spent 45 min total time on the day of the encounter, including: preparing to see the patient, obtaining and/or reviewing separately obtained history, performing a medically appropriate examination and/or evaluation, counseling and educating the patient/family caregiver, ordering medication, tests, or procedures, referring and communicating with other health care professionals, documenting clinical information in Epic, independently interpreting results and communicating results to the patient/family caregiver, and care coordination        Melodie Silvestre NP

## 2025-01-08 ENCOUNTER — HOSPITAL ENCOUNTER (OUTPATIENT)
Dept: CARDIOLOGY CLINIC | Facility: HOSPITAL | Age: 80
Discharge: HOME | End: 2025-01-08
Attending: NURSE PRACTITIONER
Payer: MEDICARE

## 2025-01-08 ENCOUNTER — HOSPITAL ENCOUNTER (OUTPATIENT)
Dept: LAB | Facility: HOSPITAL | Age: 80
Discharge: HOME | End: 2025-01-08
Attending: NURSE PRACTITIONER
Payer: MEDICARE

## 2025-01-08 VITALS
SYSTOLIC BLOOD PRESSURE: 103 MMHG | OXYGEN SATURATION: 95 % | DIASTOLIC BLOOD PRESSURE: 47 MMHG | HEART RATE: 59 BPM | BODY MASS INDEX: 20 KG/M2 | WEIGHT: 125.63 LBS | RESPIRATION RATE: 20 BRPM

## 2025-01-08 DIAGNOSIS — E87.5 HYPERKALEMIA: ICD-10-CM

## 2025-01-08 DIAGNOSIS — I25.5 ISCHEMIC CARDIOMYOPATHY WITH IMPLANTABLE CARDIOVERTER-DEFIBRILLATOR (ICD): ICD-10-CM

## 2025-01-08 DIAGNOSIS — N18.32 STAGE 3B CHRONIC KIDNEY DISEASE (HCC): ICD-10-CM

## 2025-01-08 DIAGNOSIS — I50.21 ACUTE SYSTOLIC HEART FAILURE (HCC): Primary | ICD-10-CM

## 2025-01-08 DIAGNOSIS — I50.30 (HFPEF) HEART FAILURE WITH PRESERVED EJECTION FRACTION (HCC): Primary | ICD-10-CM

## 2025-01-08 DIAGNOSIS — I25.9 CHRONIC ISCHEMIC HEART DISEASE: ICD-10-CM

## 2025-01-08 DIAGNOSIS — Z95.810 ISCHEMIC CARDIOMYOPATHY WITH IMPLANTABLE CARDIOVERTER-DEFIBRILLATOR (ICD): ICD-10-CM

## 2025-01-08 DIAGNOSIS — I50.32 CHRONIC HEART FAILURE WITH PRESERVED EJECTION FRACTION (HFPEF) (HCC): ICD-10-CM

## 2025-01-08 DIAGNOSIS — I47.20 VENTRICULAR TACHYCARDIA (HCC): ICD-10-CM

## 2025-01-08 LAB
ANION GAP SERPL CALC-SCNC: 4 MMOL/L (ref 0–18)
BUN BLD-MCNC: 42 MG/DL (ref 9–23)
CALCIUM BLD-MCNC: 8.9 MG/DL (ref 8.7–10.4)
CHLORIDE SERPL-SCNC: 109 MMOL/L (ref 98–112)
CO2 SERPL-SCNC: 26 MMOL/L (ref 21–32)
CREAT BLD-MCNC: 2.09 MG/DL
EGFRCR SERPLBLD CKD-EPI 2021: 31 ML/MIN/1.73M2 (ref 60–?)
FASTING STATUS PATIENT QL REPORTED: NO
GLUCOSE BLD-MCNC: 163 MG/DL (ref 70–99)
OSMOLALITY SERPL CALC.SUM OF ELEC: 302 MOSM/KG (ref 275–295)
POTASSIUM SERPL-SCNC: 4.7 MMOL/L (ref 3.5–5.1)
SODIUM SERPL-SCNC: 139 MMOL/L (ref 136–145)

## 2025-01-08 PROCEDURE — 80048 BASIC METABOLIC PNL TOTAL CA: CPT | Performed by: NURSE PRACTITIONER

## 2025-01-08 PROCEDURE — 99215 OFFICE O/P EST HI 40 MIN: CPT | Performed by: NURSE PRACTITIONER

## 2025-01-08 PROCEDURE — 36415 COLL VENOUS BLD VENIPUNCTURE: CPT | Performed by: NURSE PRACTITIONER

## 2025-01-08 RX ORDER — TORSEMIDE 20 MG/1
10 TABLET ORAL DAILY
Qty: 90 TABLET | Refills: 0 | Status: SHIPPED | OUTPATIENT
Start: 2025-01-08

## 2025-01-08 NOTE — PROGRESS NOTES
Patient assessed. Patient complaining of chronic shortness of breath, edema, no appetite and no taste, which he attributes to side effects of amiodarone. Patient with pitting edema in the left lower extremity and trace edema in the right lower extremity. Weight up 3 lbs at 125.6 lbs. APN notified of all the above information. Labs ordered and drawn by  Lab. Reviewed allergies and list of current medications with patient and updated it in the Electronic Medical Record. Optivol reading completed by the nurse in the clinic and reviewed by the APN.     Educated patient on low sodium diet and food choices, fluid restriction of 2 liters, and daily weights. Reviewed follow-up appointments and discharge Heart Failure instructions with patient. Patient verbalized an understanding.  Patient to return to the clinic in 2 weeks.

## 2025-01-08 NOTE — PATIENT INSTRUCTIONS
Heart Failure Discharge Instructions    Take an extra 10 mg of Torsemide today.   Starting tomorrow increase Torsemide to 10 mg daily.   Call with weight gain.       Activity: Regular exercise and activity is important for your overall health and to help keep your heart strong and functioning as well as possible.   Walk at a slow to moderate pace for 15-20 minutes 3-5 days per week.     Follow these instructions every day to keep yourself in the Green Zone     The Green Zone means you are feeling well and your symptoms are under control                                    Medications  Take your medication every day as instructed  Do not stop taking your medicine or change the amount you are taking without instructions from your doctor or nurse  Do Not take non-steroidal antiinflammatory drugs such as ibuprofen, aleve, advil, or motrin                                    Diet/Fluids  People with heart failure should eat less sodium (salt) and limit fluid. Sodium attracts water and makes the body hold fluid. This extra fluid makes the heart work harder and can worsen the symptoms of heart failure.     Diet    2000 mg sodium daily  Fluid restriction    64 ounces daily  (8 oz. = 1 cup)                                     Body Weight  Weigh yourself every day before breakfast and write your weight down  Use the same scale and wear about the same amount of clothes each time  A sudden weight increase is due to fluid retention rather than fat                                         Activity  Pace your activities to avoid getting overtired  Take rest periods as needed  Elevate your feet to reduce ankle swelling when resting                             Signs of Worsening Heart Failure    You are entering the Yellow Zone - this is a warning zone    Call your doctor or nurse if you have any of these signs or symptoms:  You gain 2 or more pounds overnight or 3-5 pounds in 3-7 days  You have more trouble breathing  You get more  tired with regular activity, or are limiting activity because of shortness of breath or fatigue  You are short of breath lying down, you need more pillows to breathe comfortably,  or wake up during the night short of breath  You urinate less often during the day and more often at night  You have a bloated feeling, upset stomach, loss of appetite, or your clothes are fitting tighter    GO TO THE EMERGENCY DEPARTMENT or CALL 911 IF:    These are signs you are in the RED ZONE - THIS IS AN EMERGENCY  You have tightness or pain in your chest  You are extremely short of breath or can't catch your breath  You cough up frothy pink mucous  You feel confused or can't think clearly  You are traveling and develop symptoms of worsening heart failure     We respect everyone's time and availability. Please be aware that this is not a walk-in clinic and we require appointments in order to facilitate timely care for all patients. We ask you to arrive 30 minutes before your appointment to allow time for you to check-in and have your bloodwork drawn. Please understand if you are late for your appointment, you may be asked to reschedule. If possible, all attempts will be made to accommodate but realize this is no guarantee that this will always be available. We understand there are extenuating circumstances. If you need to cancel or reschedule your appointment, please call the [facility] within 24 hours at [phone].  Thank you for your cooperation, [facility] Staff.    If you are currently [employer] active, starting July 1st 2024, we will be utilizing [employer] messaging ONLY to confirm your appointment.    IF YOU HAVE QUESTIONS REGARDING YOUR BILL, FEEL FREE TO CONTACT [facility] PATIENT ACCOUNTS -[phone]. IF YOU NEED FINANCIAL ASSISTANCE, PLEASE CALL AN [facility] FINANCIAL COUNSELOR -[phone].             [facility]     [phone]

## 2025-01-15 ENCOUNTER — TELEPHONE (OUTPATIENT)
Dept: CARDIOLOGY CLINIC | Facility: HOSPITAL | Age: 80
End: 2025-01-15

## 2025-01-15 NOTE — TELEPHONE ENCOUNTER
Mingo was informed that Melodie Napoles would like for him to take an extra 10 mg of torsemide for 2 days. Mingo verbalized back understanding.

## 2025-01-15 NOTE — TELEPHONE ENCOUNTER
Mingo called the [facility] reporting that since his torsemide was increased last week to 10 mg daily his weight has remained the same, he is still short of breath and tired, and his edema has improved, but still present. Mingo is wondering what the APN would like him to do. Mingo was offered an appointment today, but states that he would not be able to come to the clinic today.

## 2025-01-21 NOTE — PROGRESS NOTES
[facility] Progress Note    Mingo White is a 79 year old male who presents to clinic for APN assessment and management of chronic systolic heart failure and is functional class 3.     Subjective:  Since his last clinic visit on 1/8;  when he was advised to take an extra 20 mg of Torsemide over 2 days and then increase the weekly dose from 10 mg 4x per week to daily; he contacted us on the 15th to report ongoing shortness of breath, unchanged weight with mild improvement in leg swelling. He was offered an appointment but declined so he was told to take an extra 5 mg of Torsemide for a couple days.     Today, he is accompanied by his wife. He continues to have loss of appetite which he attributes to Amiodarone. He eats about 50% of what he used to since last October. His wife cooks with low sodium but he still occasionally eats out. He recently has a salty fish meal at [employer]. Weight is down 3 lbs on our scale.  Recent home weights have ranged from ~118.8-123 lbs. He contacted us  on the 15th when weight was up to 123 lbs and was advised to take an extra 5 mg of Torsemide for a couple days. Since weight has come down but now he is constipated. Home BP's have been 90/50's. He is currently receiving home PT with plans for Cardiac rehab after. His stamina has worsened since he started PT. He can no longer walk around the house without a walker due to weakness in his legs. Breathing has been more labored and is worse when his weight is 122-123 lbs.  Pedal and ankle swelling persist. He continues to stay up late watching shows and takes a long nap during the day. His energy levels wane by the evening.     OptioVol fluid index is mildly visible and impedance suggest ongoing fluid overload.         Last Hospitalizations/ED visits:    He was back in the ED and admitted from 11/4-11/6 with dyspnea. ProBNP was 30,000 on arrival and discharge. CXR with mild pulmonary vascular congestion.  Seen by pulmonary; no clinical signs of infection. Leukocytosis felt to be related to recent steroids. Symptoms improved with diuresis. Lisinopril resumed. Seen by Palliative Care to discuss goals of care; code status updated. ICD felt to still be appropriate even though he doesn't want CPR. Referred to outpatient Residential Palliative. Discharged at 126 lbs.     He was hospitalized 10/19-10/28 after multiple ICD shocks. Prior to the hospital stay his treatment threshold as reduced due to NSVT occurring below the threshold for therapy. He was started on Amiodarone with loading dose. Noted to be hyperkalemic (given Lokelma) on arrival with worsening renal function in the setting of increased diuretics recently for decompensation. Stay c/b acute respiratory failure prompting pulmonary consult and treatment with IV steroids and Bipap. Seen by Nephrology and gently hydrated. ACE-I stopped. 4 doses of IV Ferric given.   Underwent generator change. Attempt at upgrade to CRT-D unsuccessful. Underwent angiogram and PCI/NORRIS to OM. Echo with LVEF 15-20%, Grade I DD, low normal RV function, mild to MR, Discharged at 134 lbs. To follow up with Nephrology and Pulmonary after discharge.     Review of Systems   Constitutional: Positive for decreased appetite, malaise/fatigue and weight loss.   Cardiovascular:  Positive for dyspnea on exertion and leg swelling.   Respiratory:  Positive for shortness of breath.    Gastrointestinal:  Positive for constipation (worse).   Neurological:  Positive for weakness (worse in legs).     HISTORY:  Past Medical History:    Anesthesia complication    was \"out of it\" for a long time after sedation with previous colonoscopies    Back problem    Carotid artery disease (HCC)    Congestive heart disease (HCC)    CORONARY ARTERY DISEASE    Coronary atherosclerosis    DIABETES    Diabetes (HCC)    Gastric ulcer    HEART ATTACK    age 61    High blood pressure    HYPERLIPIDEMIA    Sinus drainage    with  weather changes      Past Surgical History:   Procedure Laterality Date    Cardiac defibrillator placement  2007    Medtronic dual chamber ICD, not pacemaker dependent    Colonoscopy  4/6/11 - DANNIE Espinoza    adenoma, fair prep, hemorrhoids, repeat 2013-14.    Colonoscopy  4/10/2014    DANNIE Espinoza. 6 adenomas, hemorrhoids, repeat 2017 w/ split 4L prep.     Colonoscopy  07/24/2017    Diverticulosis, Hemorrhoids.  Repeat PRN    Colonoscopy N/A 7/24/2017    Procedure: COLONOSCOPY;  Surgeon: Brad Tapia MD;  Location:  ENDOSCOPY    Open heart surgery (pbp)      Double bypass - done in [facility]    Other      repair AAA    Upper gi endoscopy performed  4/6/11 - DANNIE Espinoza    small , no H pylori.       Family History   Problem Relation Age of Onset    Heart Disorder Father     Heart Disorder Mother     Diabetes Maternal Grandmother       Social History     Socioeconomic History    Marital status:    Occupational History    Occupation: chemical technologist     Comment: retire age 54   Tobacco Use    Smoking status: Every Day     Current packs/day: 0.50     Average packs/day: 0.5 packs/day for 50.0 years (25.0 ttl pk-yrs)     Types: Cigarettes    Smokeless tobacco: Never   Vaping Use    Vaping status: Never Used   Substance and Sexual Activity    Alcohol use: No     Alcohol/week: 0.0 standard drinks of alcohol     Comment: quit 2006    Drug use: No   Other Topics Concern     Service Yes     Comment: [employer] - [employer] exposure    Blood Transfusions No    Caffeine Concern No    Occupational Exposure No    Hobby Hazards No    Sleep Concern No    Stress Concern No    Weight Concern No    Special Diet Yes    Back Care Yes    Exercise Yes    Seat Belt Yes   Social History Narrative    Likes to do crossword puzzles    Active with grand kids and their sports    Socializes with friends     Social Drivers of Health     Food Insecurity: No Food Insecurity (11/5/2024)    Food Insecurity     Food Insecurity: Never  true   Transportation Needs: No Transportation Needs (11/5/2024)    Transportation Needs     Lack of Transportation: No   Housing Stability: Low Risk  (11/5/2024)    Housing Stability     Housing Instability: No           Objective:     BP 94/33   Pulse 61   Resp 19   Wt 122 lb 3.2 oz (55.4 kg)   SpO2 94%   BMI 19.14 kg/m²     Wt Readings from Last 6 Encounters:   01/24/25 122 lb 3.2 oz (55.4 kg)   01/08/25 125 lb 9.6 oz (57 kg)   12/17/24 122 lb (55.3 kg)   12/03/24 120 lb 3.2 oz (54.5 kg)   11/25/24 122 lb 9.6 oz (55.6 kg)   11/15/24 124 lb 3.2 oz (56.3 kg)        Recent Results (from the past 24 hours)   Basic Metabolic Panel (8)    Collection Time: 01/24/25  7:18 AM   Result Value Ref Range    Glucose 66 (L) 70 - 99 mg/dL    Sodium 147 (H) 136 - 145 mmol/L    Potassium 4.1 3.5 - 5.1 mmol/L    Chloride 108 98 - 112 mmol/L    CO2 30.0 21.0 - 32.0 mmol/L    Anion Gap 9 0 - 18 mmol/L    BUN 41 (H) 9 - 23 mg/dL    Creatinine 2.22 (H) 0.70 - 1.30 mg/dL    Calcium, Total 9.1 8.7 - 10.6 mg/dL    Calculated Osmolality 312 (H) 275 - 295 mOsm/kg    eGFR-Cr 29 (L) >=60 mL/min/1.73m2    Patient Fasting for BMP? Yes          Current Outpatient Medications:     torsemide 20 MG Oral Tab, Take 0.5 tablets (10 mg total) by mouth daily., Disp: 90 tablet, Rfl: 0    amiodarone 200 MG Oral Tab, Take 1 tablet (200 mg total) by mouth daily., Disp: , Rfl:     metoprolol succinate ER 50 MG Oral Tablet 24 Hr, Take 1 tablet (50 mg total) by mouth in the morning and 1 tablet (50 mg total) before bedtime., Disp: 60 tablet, Rfl: 3    acetaminophen 325 MG Oral Tab, Take 2 tablets (650 mg total) by mouth every 6 (six) hours as needed for Pain or Fever., Disp: 30 tablet, Rfl: 0    insulin aspart (NOVOLOG FLEXPEN) 100 Units/mL Subcutaneous Solution Pen-injector, Test blood glucose 3 times daily with meals Inject 1 unit if blood glucose is between 141-180 mg/dL Inject 2 units if blood glucose is between 181-220 mg/dL Inject 3 units if blood  glucose is between 221-260 mg/dL Inject 4 units if blood glucose is between 261-300 mg/dL Inject 5 units if blood glucose is between 301-350 mg/dL Call your physician if blood glucose is greater than 351 mg/dL,, Disp: 5 each, Rfl: 3    Blood Glucose Monitoring Suppl (ONETOUCH VERIO FLEX SYSTEM) w/Device Does not apply Kit, Test blood sugar 3 times per day., Disp: 1 kit, Rfl: 0    OneTouch Delica Lancets 33G Does not apply Misc, Test blood sugar 3 times per day., Disp: 100 each, Rfl: 6    pantoprazole 40 MG Oral Tab EC, Take 1 tablet (40 mg total) by mouth every morning before breakfast., Disp: 90 tablet, Rfl: 0    atorvastatin 40 MG Oral Tab, Take 1 tablet (40 mg total) by mouth nightly., Disp: 30 tablet, Rfl: 1    umeclidinium-vilanterol 62.5-25 MCG/ACT Inhalation Aerosol Powder, Breath Activated, Inhale 1 puff into the lungs daily., Disp: 1 each, Rfl: 0    Glucose Blood (PRODIGY NO CODING BLOOD GLUC) In Vitro Strip, Use as directed to test blood sugar once daily, Disp: 100 strip, Rfl: 3    Glucose Blood (ONETOUCH VERIO) In Vitro Strip, Test blood sugar 3 times per day., Disp: 100 strip, Rfl: 6    albuterol 108 (90 Base) MCG/ACT Inhalation Aero Soln, Inhale 2 puffs into the lungs every 4 (four) hours as needed for Wheezing., Disp: 1 each, Rfl: 0    Prodigy Lancets 28G Does not apply Misc, Test once daily, Disp: 100 each, Rfl: 3    Lancet Device Does not apply Misc, MEASURE HOME GLUCOSE DAILY --DIRECTED, Disp: 1 each, Rfl: 11    CLOPIDOGREL BISULFATE 75 MG Oral Tab, TAKE 1 TABLET DAILY, Disp: 90 tablet, Rfl: 3    ASPIRIN 81 MG OR TABS, Take 1 tablet (81 mg total) by mouth at bedtime., Disp: , Rfl:     Exam:   General:         Alert, in no apparent distress  HEENT:          no JVD  Lungs:            diminished                     CV:                  S1, S2 regular  Abdomen:      Non-distended, soft  Extremities:    bilateral LE edema L>R +1 pedal/ankle   Neuro:             A&O x 3  Skin:                Pink, warm,  dry    Education:  Patient instructed regarding sodium restricted diet, low sodium foods, fluid restriction, daily weights, medication regimen, s/s HF exacerbation and when to call APN/clinic.    [x] BiV/ICD device, Medtronic, unsuccesful attempt at upgrade to BiV  [] CardioMEMs  [x] ARNi/ACEi/ARB, intolerant to ARNI with low BP, ACE recently stopped due to low BP   [x] BB  [] MRA, intolerant with hyperkalemia.   [] SGLT2i, intolerant to with diarrhea. May try Farxiga when not hypovolemic.   [] Verquvo, benefits not substantiated with ProBNP of 16,000  [] Corlanor, HR controlled   [] CCM, can consider since unsuccessful attempt at BiV upgrade if a candidate with LVEF 15%.   [] Barostim, ProBNP excludes him     Assessment:   HFrEF, ACC/AHA Stage C, NYHA Class 3. ProBNP 15,726 on 11/23, improved from 16,483 his first visit. ProBNP up to 30,781 in the hospital and remained elevated on discharge. OptiVol and impedance suggestive of ongoing fluid retention. Hypervolemic.   Ischemic CM s/p Medtronic ICD; LVEF 15%.   CAD with remote CABG 2006; recent ischemic evaluation done due to VT. Cath 10/24 showed 100% RCA and LAD. Lcx with 90% ostial OM1. LIMA-LAD and SVG-RCA patent. S/P PCI OM1. On statin, ASA and Plavix.    CKD Stage 3 - Hx left renal artery stenosis. Baseline creatinine ~1.9-2.2 mg/dL. Metformin and ACE-I stopped during admission in October. ACE-I resumed in November, and now stopped by Nephrology. Creatinine  2.22 mg/dL today. Follows with Dr. Adorno.   Anemia,  last Hgb 10.6 g/dL; improved from 9.6 g/dL likely concentrated since dehydrated. Last Iron sat 13%, Ferritin 506.   DM Type II, last A1c 6.9% 11/4. Metformin stopped. Intolerant to Jardiance in the past with diarrhea, but will to retry if recommended. On insulin. Dr. Espino managing.   Hx VT with multiple recent shocks requiring admission. New on Amiodarone in October, dose reduced 12/10 to 200 mg daily per Dr. Rogers. Attempt at upgrade to BiV ICD  unsuccessful. Follows with Dr. Pedroza, saw him on 11/15. Had device check with reported programming changes 1/13.   Hx Hyperkalemia, potassium up to 6.1 recently, improved with Lokelma.   Severe COPD with hx tobacco abuse; on Anoro. Follows with Dr. Clemons, due to see him with PFT's.   AAA s/p EVAR 2008 with small endoleak - Follows with Dr. White.  Bilateral moderate carotid artery disease     Plan:     1 mg IVP Bumex x1..   Continue Torsemide 10 mg daily, would prefer to avoid increasing if possible due to constipation.    Discussed consideration for starting Farxiga next visit. Will reach out to Dr. Espino to see if insulin needs to be adjusted with glucose of 66 today.  Continue daily weights and BP monitoring. Encouraged to contact us with weight gain.   Continue PT, consider Cardiac rehab after.    Return to clinic in 1-2 weeks.   F/U with Dr. Adorno in April.   Advised he can try MiraLax for constipation and encouraged to make sure he eats regularly and includes fiber.   Recommended he contact the device clinic regarding Lead warning on interrogation today. It may be related to recent programming changes but   F/U with Dr. Rogers in February.   CHF discharge instructions given    I have spent 45 min total time on the day of the encounter, including: preparing to see the patient, obtaining and/or reviewing separately obtained history, performing a medically appropriate examination and/or evaluation, counseling and educating the patient/family caregiver, ordering medication, tests, or procedures, referring and communicating with other health care professionals, documenting clinical information in Epic, independently interpreting results and communicating results to the patient/family caregiver, and care coordination        Melodie Silvestre NP

## 2025-01-24 ENCOUNTER — HOSPITAL ENCOUNTER (OUTPATIENT)
Dept: LAB | Facility: HOSPITAL | Age: 80
Discharge: HOME | End: 2025-01-24
Attending: NURSE PRACTITIONER
Payer: MEDICARE

## 2025-01-24 ENCOUNTER — HOSPITAL ENCOUNTER (OUTPATIENT)
Dept: CARDIOLOGY CLINIC | Facility: HOSPITAL | Age: 80
Discharge: HOME | End: 2025-01-24
Attending: NURSE PRACTITIONER
Payer: MEDICARE

## 2025-01-24 VITALS
HEART RATE: 59 BPM | OXYGEN SATURATION: 94 % | DIASTOLIC BLOOD PRESSURE: 66 MMHG | SYSTOLIC BLOOD PRESSURE: 93 MMHG | WEIGHT: 122.19 LBS | BODY MASS INDEX: 19 KG/M2 | RESPIRATION RATE: 15 BRPM

## 2025-01-24 DIAGNOSIS — N18.32 STAGE 3B CHRONIC KIDNEY DISEASE (HCC): ICD-10-CM

## 2025-01-24 DIAGNOSIS — I50.30 (HFPEF) HEART FAILURE WITH PRESERVED EJECTION FRACTION (HCC): Primary | ICD-10-CM

## 2025-01-24 DIAGNOSIS — E87.5 HYPERKALEMIA: ICD-10-CM

## 2025-01-24 DIAGNOSIS — I25.9 CHRONIC ISCHEMIC HEART DISEASE: ICD-10-CM

## 2025-01-24 DIAGNOSIS — I50.30 (HFPEF) HEART FAILURE WITH PRESERVED EJECTION FRACTION (HCC): ICD-10-CM

## 2025-01-24 DIAGNOSIS — I50.21 ACUTE SYSTOLIC HEART FAILURE (HCC): Primary | ICD-10-CM

## 2025-01-24 LAB
ANION GAP SERPL CALC-SCNC: 9 MMOL/L (ref 0–18)
BUN BLD-MCNC: 41 MG/DL (ref 9–23)
CALCIUM BLD-MCNC: 9.1 MG/DL (ref 8.7–10.6)
CHLORIDE SERPL-SCNC: 108 MMOL/L (ref 98–112)
CO2 SERPL-SCNC: 30 MMOL/L (ref 21–32)
CREAT BLD-MCNC: 2.22 MG/DL
EGFRCR SERPLBLD CKD-EPI 2021: 29 ML/MIN/1.73M2 (ref 60–?)
FASTING STATUS PATIENT QL REPORTED: YES
GLUCOSE BLD-MCNC: 66 MG/DL (ref 70–99)
OSMOLALITY SERPL CALC.SUM OF ELEC: 312 MOSM/KG (ref 275–295)
POTASSIUM SERPL-SCNC: 4.1 MMOL/L (ref 3.5–5.1)
SODIUM SERPL-SCNC: 147 MMOL/L (ref 136–145)

## 2025-01-24 PROCEDURE — 36415 COLL VENOUS BLD VENIPUNCTURE: CPT | Performed by: NURSE PRACTITIONER

## 2025-01-24 PROCEDURE — 80048 BASIC METABOLIC PNL TOTAL CA: CPT | Performed by: NURSE PRACTITIONER

## 2025-01-24 PROCEDURE — 99215 OFFICE O/P EST HI 40 MIN: CPT | Performed by: NURSE PRACTITIONER

## 2025-01-24 PROCEDURE — G2212 PROLONG OUTPT/OFFICE VIS: HCPCS | Performed by: NURSE PRACTITIONER

## 2025-01-24 RX ORDER — BUMETANIDE 0.25 MG/ML
1 INJECTION, SOLUTION INTRAMUSCULAR; INTRAVENOUS ONCE
Status: COMPLETED | OUTPATIENT
Start: 2025-01-24 | End: 2025-01-24

## 2025-01-24 RX ADMIN — BUMETANIDE 1 MG: 0.25 INJECTION, SOLUTION INTRAMUSCULAR; INTRAVENOUS at 08:38:00

## 2025-01-24 NOTE — PROGRESS NOTES
Patient assessed. Patient complaining of being dehydrated if his weight is 119 lbs or below and being short of breath with lower extremity edema if his weight is 122-123 lbs or more.     Weight in the clinic was 122.2 lbs. Patient denies any issues with bloating. Patient with mild edema in his feet and ankles. Patient reports his shortness of breath is stable today. APN notified of all the above information. Labs ordered and drawn by  Lab. Reviewed allergies and list of current medications with patient and updated it in the Electronic Medical Record. Optivol reading completed by the nurse in the clinic and reviewed by the APN.    Patient's serum glucose was 66. Patient declined any treatment of his low blood sugar, but did agree to drinking orange juice. Patient drank 8 oz of orange juice and agreed to having breakfast as soon as he gets home. IV established per protocol. IV 1 mg bumex given. Patient tolerated it well. Educated patient on low sodium diet and food choices, fluid restriction of 2 liters, and daily weights. Reviewed follow-up appointments and discharge Heart Failure instructions with patient. Patient verbalized an understanding. IV discontinued; catheter intact. Pressure held and gauze applied. Patient to return to the clinic in 1-2 weeks.

## 2025-01-24 NOTE — PATIENT INSTRUCTIONS
Heart Failure Discharge Instructions    Call with weight gain, worsening leg swelling or shortness of breath.  Check with the device clinic about Lead warning noted on interrogation today.      Activity: Regular exercise and activity is important for your overall health and to help keep your heart strong and functioning as well as possible.   Walk at a slow to moderate pace for 15-20 minutes 3-5 days per week.     Follow these instructions every day to keep yourself in the Green Zone     The Green Zone means you are feeling well and your symptoms are under control                                    Medications  Take your medication every day as instructed  Do not stop taking your medicine or change the amount you are taking without instructions from your doctor or nurse  Do Not take non-steroidal antiinflammatory drugs such as ibuprofen, aleve, advil, or motrin                                    Diet/Fluids  People with heart failure should eat less sodium (salt) and limit fluid. Sodium attracts water and makes the body hold fluid. This extra fluid makes the heart work harder and can worsen the symptoms of heart failure.     Diet    2000 mg sodium daily  Fluid restriction    64 ounces daily  (8 oz. = 1 cup)                                     Body Weight  Weigh yourself every day before breakfast and write your weight down  Use the same scale and wear about the same amount of clothes each time  A sudden weight increase is due to fluid retention rather than fat                                         Activity  Pace your activities to avoid getting overtired  Take rest periods as needed  Elevate your feet to reduce ankle swelling when resting                             Signs of Worsening Heart Failure    You are entering the Yellow Zone - this is a warning zone    Call your doctor or nurse if you have any of these signs or symptoms:  You gain 2 or more pounds overnight or 3-5 pounds in 3-7 days  You have more trouble  breathing  You get more tired with regular activity, or are limiting activity because of shortness of breath or fatigue  You are short of breath lying down, you need more pillows to breathe comfortably,  or wake up during the night short of breath  You urinate less often during the day and more often at night  You have a bloated feeling, upset stomach, loss of appetite, or your clothes are fitting tighter    GO TO THE EMERGENCY DEPARTMENT or CALL 911 IF:    These are signs you are in the RED ZONE - THIS IS AN EMERGENCY  You have tightness or pain in your chest  You are extremely short of breath or can't catch your breath  You cough up frothy pink mucous  You feel confused or can't think clearly  You are traveling and develop symptoms of worsening heart failure     We respect everyone's time and availability. Please be aware that this is not a walk-in clinic and we require appointments in order to facilitate timely care for all patients. We ask you to arrive 30 minutes before your appointment to allow time for you to check-in and have your bloodwork drawn. Please understand if you are late for your appointment, you may be asked to reschedule. If possible, all attempts will be made to accommodate but realize this is no guarantee that this will always be available. We understand there are extenuating circumstances. If you need to cancel or reschedule your appointment, please call the [facility] within 24 hours at [phone].  Thank you for your cooperation, [facility] Staff.    If you are currently [employer] active, starting July 1st 2024, we will be utilizing [employer] messaging ONLY to confirm your appointment.    IF YOU HAVE QUESTIONS REGARDING YOUR BILL, FEEL FREE TO CONTACT [facility] PATIENT ACCOUNTS -[phone]. IF YOU NEED FINANCIAL ASSISTANCE, PLEASE CALL AN [facility] FINANCIAL COUNSELOR -[phone].             [facility]     [phone]

## (undated) DEVICE — 3M™ RED DOT™ MONITORING ELECTRODE WITH FOAM TAPE AND STICKY GEL, 50/BAG, 20/CASE, 72/PLT 2570: Brand: RED DOT™

## (undated) DEVICE — 1200CC GUARDIAN II: Brand: GUARDIAN

## (undated) DEVICE — FILTERLINE NASAL ADULT O2/CO2

## (undated) DEVICE — ENDOSCOPY PACK - LOWER: Brand: MEDLINE INDUSTRIES, INC.

## (undated) DEVICE — Device: Brand: DEFENDO AIR/WATER/SUCTION AND BIOPSY VALVE

## (undated) NOTE — Clinical Note
Good morning, Mingo sees you tomorrow. He was still dehydrated today and I made some medication changes. Will see him again next Tuesday. Have a great week!  Melodie Silvestre

## (undated) NOTE — Clinical Note
[address]  [address]  Consent for Procedure/Sedation  Date: ***         Time: ***    {[facility] ivs consent:[id number]}

## (undated) NOTE — LETTER
[address]  [address]  Consent for Procedure/Sedation  Date: 10/24/24         Time: 1403    I hereby authorize Dr. Gage, my physician and his/her assistants (if applicable), which may include medical students, residents, and/or fellows, to perform the following surgical operation/ procedure and administer such anesthesia as may be determined necessary by my physician:  Operation/Procedure name (s) venogram, Cardiac Catheterization, Left Ventricular Cineangiography, Bilateral Selective Coronary Angiography and/or Right Heart Catheterization; possible Percutaneous Transluminal Coronary Angioplasty, Coronary Atherectomy, Coronary Stent, Intracoronary Thrombolytic therapy, Antiplatelet therapy and/or Intravascular Ultrasound on Mingo White   2.   I recognize that during the surgical operation/procedure, unforeseen conditions may necessitate additional or different procedures than those listed above.  I, therefore, further authorize and request that the above-named surgeon, assistants, or designees perform such procedures as are, in their judgment, necessary and desirable.    3.   My surgeon/physician has discussed prior to my surgery the potential benefits, risks and side effects of this procedure; the likelihood of achieving goals; and potential problems that might occur during recuperation.  They also discussed reasonable alternatives to the procedure, including risks, benefits, and side effects related to the alternatives and risks related to not receiving this procedure.  I have had all my questions answered and I acknowledge that no guarantee has been made as to the result that may be obtained.    4.   Should the need arise during my operation/procedure, which includes change of level of care prior to discharge, I also consent to the administration of blood and/or blood products.  Further, I understand that despite careful testing and screening of blood or blood products by  collecting agencies, I may still be subject to ill effects as a result of receiving a blood transfusion and/or blood products.  The following are some, but not all, of the potential risks that can occur: fever and allergic reactions, hemolytic reactions, transmission of diseases such as Hepatitis, AIDS and Cytomegalovirus (CMV) and fluid overload.  In the event that I wish to have an autologous transfusion of my own blood, or a directed donor transfusion, I will discuss this with my physician.  Check only if Refusing Blood or Blood Products  I understand refusal of blood or blood products as deemed necessary by my physician may have serious consequences to my condition to include possible death. I hereby assume responsibility for my refusal and release the hospital, its personnel, and my physicians from any responsibility for the consequences of my refusal.          o  Refuse      5.   I authorize the use of any specimen, organs, tissues, body parts or foreign objects that may be removed from my body during the operation/procedure for diagnosis, research or teaching purposes and their subsequent disposal by hospital authorities.  I also authorize the release of specimen test results and/or written reports to my treating physician on the hospital medical staff or other referring or consulting physicians involved in my care, at the discretion of the Pathologist or my treating physician.    6.   I consent to the photographing or videotaping of the operations or procedures to be performed, including appropriate portions of my body for medical, scientific, or educational purposes, provided my identity is not revealed by the pictures or by descriptive texts accompanying them.  If the procedure has been photographed/videotaped, the surgeon will obtain the original picture, image, videotape or CD.  The hospital will not be responsible for storage, release or maintenance of the picture, image, tape or CD.    7.   I consent  to the presence of a  or observers in the operating room as deemed necessary by my physician or their designees.    8.   I recognize that in the event my procedure results in extended X-Ray/fluoroscopy time, I may develop a skin reaction.    9. If I have a Do Not Attempt Resuscitation (DNAR) order in place, that status will be suspended while in the operating room, procedural suite, and during the recovery period unless otherwise explicitly stated by me (or a person authorized to consent on my behalf). The surgeon or my attending physician will determine when the applicable recovery period ends for purposes of reinstating the DNAR order.  10. Patients having a sterilization procedure: I understand that if the procedure is successful the results will be permanent and it will therefore be impossible for me to inseminate, conceive, or bear children.  I also understand that the procedure is intended to result in sterility, although the result has not been guaranteed.   11. I acknowledge that my physician has explained sedation/analgesia administration to me including the risk and benefits I consent to the administration of sedation/analgesia as may be necessary or desirable in the judgment of my physician.    I CERTIFY THAT I HAVE READ AND FULLY UNDERSTAND THE ABOVE CONSENT TO OPERATION and/or OTHER PROCEDURE.      ____________________________________       _________________________________      ______________________________  Signature of Patient         Signature of Responsible Person        Printed Name of Responsible Person   ____________________________________      _________________________________      ______________________________       Signature of Witness          Relationship to Patient                       Date                                       Time  Patient Name: Mingo White  : 1945    Reviewed: 2024   Printed: 2024  Medical Record #: [id number] Page 1 of 1

## (undated) NOTE — LETTER
[address]  [address]  Consent for Procedure/Sedation  Date: 10/23/24         Time: ________    I hereby authorize Dr. Gage/Carmen, my physician and his/her assistants (if applicable), which may include medical students, residents, and/or fellows, to perform the following surgical operation/ procedure and administer such anesthesia as may be determined necessary by my physician:  Operation/Procedure name (s)  Cardiac Catheterization, Left Ventricular Cineangiography, Bilateral Selective Coronary Angiography and/or Right Heart Catheterization; possible Percutaneous Transluminal Coronary Angioplasty, Coronary Atherectomy, Coronary Stent, Intracoronary Thrombolytic therapy, Antiplatelet therapy and/or Intravascular Ultrasound on Mingo White   2.   I recognize that during the surgical operation/procedure, unforeseen conditions may necessitate additional or different procedures than those listed above.  I, therefore, further authorize and request that the above-named surgeon, assistants, or designees perform such procedures as are, in their judgment, necessary and desirable.    3.   My surgeon/physician has discussed prior to my surgery the potential benefits, risks and side effects of this procedure; the likelihood of achieving goals; and potential problems that might occur during recuperation.  They also discussed reasonable alternatives to the procedure, including risks, benefits, and side effects related to the alternatives and risks related to not receiving this procedure.  I have had all my questions answered and I acknowledge that no guarantee has been made as to the result that may be obtained.    4.   Should the need arise during my operation/procedure, which includes change of level of care prior to discharge, I also consent to the administration of blood and/or blood products.  Further, I understand that despite careful testing and screening of blood or blood products  by collecting agencies, I may still be subject to ill effects as a result of receiving a blood transfusion and/or blood products.  The following are some, but not all, of the potential risks that can occur: fever and allergic reactions, hemolytic reactions, transmission of diseases such as Hepatitis, AIDS and Cytomegalovirus (CMV) and fluid overload.  In the event that I wish to have an autologous transfusion of my own blood, or a directed donor transfusion, I will discuss this with my physician.  Check only if Refusing Blood or Blood Products  I understand refusal of blood or blood products as deemed necessary by my physician may have serious consequences to my condition to include possible death. I hereby assume responsibility for my refusal and release the hospital, its personnel, and my physicians from any responsibility for the consequences of my refusal.          o  Refuse      5.   I authorize the use of any specimen, organs, tissues, body parts or foreign objects that may be removed from my body during the operation/procedure for diagnosis, research or teaching purposes and their subsequent disposal by hospital authorities.  I also authorize the release of specimen test results and/or written reports to my treating physician on the hospital medical staff or other referring or consulting physicians involved in my care, at the discretion of the Pathologist or my treating physician.    6.   I consent to the photographing or videotaping of the operations or procedures to be performed, including appropriate portions of my body for medical, scientific, or educational purposes, provided my identity is not revealed by the pictures or by descriptive texts accompanying them.  If the procedure has been photographed/videotaped, the surgeon will obtain the original picture, image, videotape or CD.  The hospital will not be responsible for storage, release or maintenance of the picture, image, tape or CD.    7.   I  consent to the presence of a  or observers in the operating room as deemed necessary by my physician or their designees.    8.   I recognize that in the event my procedure results in extended X-Ray/fluoroscopy time, I may develop a skin reaction.    9. If I have a Do Not Attempt Resuscitation (DNAR) order in place, that status will be suspended while in the operating room, procedural suite, and during the recovery period unless otherwise explicitly stated by me (or a person authorized to consent on my behalf). The surgeon or my attending physician will determine when the applicable recovery period ends for purposes of reinstating the DNAR order.  10. Patients having a sterilization procedure: I understand that if the procedure is successful the results will be permanent and it will therefore be impossible for me to inseminate, conceive, or bear children.  I also understand that the procedure is intended to result in sterility, although the result has not been guaranteed.   11. I acknowledge that my physician has explained sedation/analgesia administration to me including the risk and benefits I consent to the administration of sedation/analgesia as may be necessary or desirable in the judgment of my physician.    I CERTIFY THAT I HAVE READ AND FULLY UNDERSTAND THE ABOVE CONSENT TO OPERATION and/or OTHER PROCEDURE.      ____________________________________       _________________________________      ______________________________  Signature of Patient         Signature of Responsible Person        Printed Name of Responsible Person   ____________________________________      _________________________________      ______________________________       Signature of Witness          Relationship to Patient                       Date                                       Time  Patient Name: Mingo White  : 1945    Reviewed: 2024   Printed: 2024  Medical Record #: [id number] Page  1 of 1

## (undated) NOTE — Clinical Note
Hi Dr. Espino,  I saw Mingo today and discussed consideration for restarting him on an SGLT2i. He has been fluid overloaded requiring extra diuretics that constipate him. He was on Jardiance in the past and had diarrhea but he is willing to retry it. Before I started it I wanted to check with you to see if you think his insulin should be adjusted. His fasting glucose in the clinic today was 66 and his appetite continues to be poor. Thank you, Melodie Silvestre

## (undated) NOTE — LETTER
[address]  [address]  Consent for Procedure/Sedation  Date: 10/25/2024         Time: 1405    I hereby authorize Dr Pedroza, my physician and his/her assistants (if applicable), which may include medical students, residents, and/or fellows, to perform the following surgical operation/ procedure and administer such anesthesia as may be determined necessary by my physician: Biventricular Implantable cardioverter defibrillator upgrade    on Mingo White  2.   I recognize that during the surgical operation/procedure, unforeseen conditions may necessitate additional or different procedures than those listed above.  I, therefore, further authorize and request that the above-named surgeon, assistants, or designees perform such procedures as are, in their judgment, necessary and desirable.    3.   My surgeon/physician has discussed prior to my surgery the potential benefits, risks and side effects of this procedure; the likelihood of achieving goals; and potential problems that might occur during recuperation.  They also discussed reasonable alternatives to the procedure, including risks, benefits, and side effects related to the alternatives and risks related to not receiving this procedure.  I have had all my questions answered and I acknowledge that no guarantee has been made as to the result that may be obtained.    4.   Should the need arise during my operation/procedure, which includes change of level of care prior to discharge, I also consent to the administration of blood and/or blood products.  Further, I understand that despite careful testing and screening of blood or blood products by collecting agencies, I may still be subject to ill effects as a result of receiving a blood transfusion and/or blood products.  The following are some, but not all, of the potential risks that can occur: fever and allergic reactions, hemolytic reactions, transmission of diseases such as  Hepatitis, AIDS and Cytomegalovirus (CMV) and fluid overload.  In the event that I wish to have an autologous transfusion of my own blood, or a directed donor transfusion, I will discuss this with my physician.   Check only if Refusing Blood or Blood Products  I understand refusal of blood or blood products as deemed necessary by my physician may have serious consequences to my condition to include possible death. I hereby assume responsibility for my refusal and release the hospital, its personnel, and my physicians from any responsibility for the consequences of my refusal.         o  Refuse         5.   I authorize the use of any specimen, organs, tissues, body parts or foreign objects that may be removed from my body during the operation/procedure for diagnosis, research or teaching purposes and their subsequent disposal by hospital authorities.  I also authorize the release of specimen test results and/or written reports to my treating physician on the hospital medical staff or other referring or consulting physicians involved in my care, at the discretion of the Pathologist or my treating physician.    6.   I consent to the photographing or videotaping of the operations or procedures to be performed, including appropriate portions of my body for medical, scientific, or educational purposes, provided my identity is not revealed by the pictures or by descriptive texts accompanying them.  If the procedure has been photographed/videotaped, the surgeon will obtain the original picture, image, videotape or CD.  The hospital will not be responsible for storage, release or maintenance of the picture, image, tape or CD.    7.   I consent to the presence of a  or observers in the operating room as deemed necessary by my physician or their designees.    8.   I recognize that in the event my procedure results in extended X-Ray/fluoroscopy time, I may develop a skin reaction.    9. If I have a Do Not  Attempt Resuscitation (DNAR) order in place, that status will be suspended while in the operating room, procedural suite, and during the recovery period unless otherwise explicitly stated by me (or a person authorized to consent on my behalf). The surgeon or my attending physician will determine when the applicable recovery period ends for purposes of reinstating the DNAR order.  10. Patients having a sterilization procedure: I understand that if the procedure is successful the results will be permanent and it will therefore be impossible for me to inseminate, conceive, or bear children.  I also understand that the procedure is intended to result in sterility, although the result has not been guaranteed.   11. I acknowledge that my physician has explained sedation/analgesia administration to me including the risk and benefits I consent to the administration of sedation/analgesia as may be necessary or desirable in the judgment of my physician.    I CERTIFY THAT I HAVE READ AND FULLY UNDERSTAND THE ABOVE CONSENT TO OPERATION and/or OTHER PROCEDURE.        ____________________________________       _________________________________      ______________________________  Signature of Patient         Signature of Responsible Person        Printed Name of Responsible Person        ____________________________________      _________________________________      ______________________________       Signature of Witness          Relationship to Patient                       Date                                       Time  Patient Name: Mingo White  : 1945    Reviewed: 2024   Printed: 2024  Medical Record #: [id number] Page 1 of 1

## (undated) NOTE — LETTER
[address]  [address]  Authorization for Surgical Operation and Procedure     Date:___________                                                                                                         Time:__________  I hereby authorize * Surgery not found *, my physician and his/her assistants (if applicable), which may include medical students, residents, and/or fellows, to perform the following surgical operation/ procedure and administer such anesthesia as may be determined necessary by my physician:  Operation/Procedure name (s)  on Mingo White   2.   I recognize that during the surgical operation/procedure, unforeseen conditions may necessitate additional or different procedures than those listed above.  I, therefore, further authorize and request that the above-named surgeon, assistants, or designees perform such procedures as are, in their judgment, necessary and desirable.    3.   My surgeon/physician has discussed prior to my surgery the potential benefits, risks and side effects of this procedure; the likelihood of achieving goals; and potential problems that might occur during recuperation.  They also discussed reasonable alternatives to the procedure, including risks, benefits, and side effects related to the alternatives and risks related to not receiving this procedure.  I have had all my questions answered and I acknowledge that no guarantee has been made as to the result that may be obtained.    4.   Should the need arise during my operation/procedure, which includes change of level of care prior to discharge, I also consent to the administration of blood and/or blood products.  Further, I understand that despite careful testing and screening of blood or blood products by collecting agencies, I may still be subject to ill effects as a result of receiving a blood transfusion and/or blood products.  The following are some, but not all, of the potential risks  that can occur: fever and allergic reactions, hemolytic reactions, transmission of diseases such as Hepatitis, AIDS and Cytomegalovirus (CMV) and fluid overload.  In the event that I wish to have an autologous transfusion of my own blood, or a directed donor transfusion, I will discuss this with my physician.  Check only if Refusing Blood or Blood Products  I understand refusal of blood or blood products as deemed necessary by my physician may have serious consequences to my condition to include possible death. I hereby assume responsibility for my refusal and release the hospital, its personnel, and my physicians from any responsibility for the consequences of my refusal.          o  Refuse      5.   I authorize the use of any specimen, organs, tissues, body parts or foreign objects that may be removed from my body during the operation/procedure for diagnosis, research or teaching purposes and their subsequent disposal by hospital authorities.  I also authorize the release of specimen test results and/or written reports to my treating physician on the hospital medical staff or other referring or consulting physicians involved in my care, at the discretion of the Pathologist or my treating physician.    6.   I consent to the photographing or videotaping of the operations or procedures to be performed, including appropriate portions of my body for medical, scientific, or educational purposes, provided my identity is not revealed by the pictures or by descriptive texts accompanying them.  If the procedure has been photographed/videotaped, the surgeon will obtain the original picture, image, videotape or CD.  The hospital will not be responsible for storage, release or maintenance of the picture, image, tape or CD.    7.   I consent to the presence of a  or observers in the operating room as deemed necessary by my physician or their designees.    8.   I recognize that in the event my procedure results  in extended X-Ray/fluoroscopy time, I may develop a skin reaction.    9. If I have a Do Not Attempt Resuscitation (DNAR) order in place, that status will be suspended while in the operating room, procedural suite, and during the recovery period unless otherwise explicitly stated by me (or a person authorized to consent on my behalf). The surgeon or my attending physician will determine when the applicable recovery period ends for purposes of reinstating the DNAR order.  10. Patients having a sterilization procedure: I understand that if the procedure is successful the results will be permanent and it will therefore be impossible for me to inseminate, conceive, or bear children.  I also understand that the procedure is intended to result in sterility, although the result has not been guaranteed.   11. I acknowledge that my physician has explained sedation/analgesia administration to me including the risk and benefits I consent to the administration of sedation/analgesia as may be necessary or desirable in the judgment of my physician.    I CERTIFY THAT I HAVE READ AND FULLY UNDERSTAND THE ABOVE CONSENT TO OPERATION and/or OTHER PROCEDURE.    _________________________________________  __________________________________  Signature of Patient     Signature of Responsible Person         ___________________________________         Printed Name of Responsible Person           _________________________________                 Relationship to Patient  _________________________________________  ______________________________  Signature of Witness          Date  Time      Patient Name: Mingo White     : 1945                 Printed: 2024     Medical Record #: [id number]                     Page 1 [address]  [address]    Consent for Anesthesia    I, Mingo White agree to be cared for by an anesthesiologist, who is specially  trained to monitor me and give me medicine to put me to sleep or keep me comfortable during my procedure    I understand that my anesthesiologist is not an employee or agent of [facility] or [employer]. He or she works for [employer].    As the patient asking for anesthesia services, I agree to:  Allow the anesthesiologist (anesthesia doctor) to give me medicine and do additional procedures as necessary. Some examples are: Starting or using an “IV” to give me medicine, fluids or blood during my procedure, and having a breathing tube placed to help me breathe when I’m asleep (intubation). In the event that my heart stops working properly, I understand that my anesthesiologist will make every effort to sustain my life, unless otherwise directed by [facility] Do Not Resuscitate documents.  Tell my anesthesia doctor before my procedure:  If I am pregnant.  The last time that I ate or drank.  All of the medicines I take (including prescriptions, herbal supplements, and pills I can buy without a prescription (including street drugs/illegal medications). Failure to inform my anesthesiologist about these medicines may increase my risk of anesthetic complications.  If I am allergic to anything or have had a reaction to anesthesia before.  I understand how the anesthesia medicine will help me (benefits).  I understand that with any type of anesthesia medicine there are risks:  The most common risks are: nausea, vomiting, sore throat, muscle soreness, damage to my eyes, mouth, or teeth (from breathing tube placement).  Rare risks include: remembering what happened during my procedure, allergic reactions to medications, injury to my airway, heart, lungs, vision, nerves, or muscles and in extremely rare instances death.  My doctor has explained to me other choices available to me for my care (alternatives).  Pregnant Patients (“epidural”):  I understand that the risks of having an  epidural (medicine given into my back to help control pain during labor), include itching, low blood pressure, difficulty urinating, headache or slowing of the baby’s heart. Very rare risks include infection, bleeding, seizure, irregular heart rhythms and nerve injury.  Regional Anesthesia (“spinal”, “epidural”, & “nerve blocks”):  I understand that rare but potential complications include headache, bleeding, infection, seizure, irregular heart rhythms, and nerve injury.    I can change my mind about having anesthesia services at any time before I get the medicine.    _____________________________________________________________________________  Patient (or Representative) Signature/Relationship to Patient  Date   Time    _____________________________________________________________________________   Name (if used)    Language/Organization   Time    _____________________________________________________________________________  Anesthesiologist Signature     Date   Time  I have discussed the procedure and information above with the patient (or patient’s representative) and answered their questions. The patient or their representative has agreed to have anesthesia services.    _____________________________________________________________________________  Witness        Date   Time  I have verified that the signature is that of the patient or patient’s representative, and that it was signed before the procedure  Patient Name: Mingo White     : 1945                 Printed: 2024     Medical Record #: [id number]                     Page 2 of 2

## (undated) NOTE — Clinical Note
Hi Dr. Ovalles, I met Mingo today. He is a little dehydrated and I reduced torsemide. I wanted to get your thoughts on eventually adding an SGLT2i and/or Kerendia once volume status is improved. Pending clinical status at the time may reduce Torsemide further.  Thank you, Melodie Silvestre